# Patient Record
Sex: MALE | Race: BLACK OR AFRICAN AMERICAN | Employment: OTHER | URBAN - METROPOLITAN AREA
[De-identification: names, ages, dates, MRNs, and addresses within clinical notes are randomized per-mention and may not be internally consistent; named-entity substitution may affect disease eponyms.]

---

## 2017-01-26 ENCOUNTER — OFFICE VISIT (OUTPATIENT)
Dept: INTERNAL MEDICINE CLINIC | Age: 67
End: 2017-01-26

## 2017-01-26 VITALS
OXYGEN SATURATION: 96 % | DIASTOLIC BLOOD PRESSURE: 78 MMHG | TEMPERATURE: 99 F | SYSTOLIC BLOOD PRESSURE: 112 MMHG | BODY MASS INDEX: 31.08 KG/M2 | HEIGHT: 71 IN | HEART RATE: 100 BPM | WEIGHT: 222 LBS | RESPIRATION RATE: 16 BRPM

## 2017-01-26 DIAGNOSIS — E11.9 CONTROLLED TYPE 2 DIABETES MELLITUS WITHOUT COMPLICATION, WITHOUT LONG-TERM CURRENT USE OF INSULIN (HCC): Primary | ICD-10-CM

## 2017-01-26 DIAGNOSIS — Z12.11 SCREEN FOR COLON CANCER: ICD-10-CM

## 2017-01-26 DIAGNOSIS — Z11.59 NEED FOR HEPATITIS C SCREENING TEST: ICD-10-CM

## 2017-01-26 LAB
GLUCOSE POC: 145 MG/DL
HBA1C MFR BLD HPLC: 6.6 %

## 2017-01-26 NOTE — PATIENT INSTRUCTIONS
[email or web address] Activation    Thank you for requesting access to TigerTrade. Please follow the instructions below to securely access and download your online medical record. TigerTrade allows you to send messages to your doctor, view your test results, renew your prescriptions, schedule appointments, and more. How Do I Sign Up? 1. In your internet browser, go to [email or web address]  2. Click on the First Time User? Click Here link in the Sign In box. You will be redirect to the New Member Sign Up page. 3. Enter your TigerTrade Access Code exactly as it appears below. You will not need to use this code after youve completed the sign-up process. If you do not sign up before the expiration date, you must request a new code. TigerTrade Access Code: Activation code not generated  Current TigerTrade Status: Patient Declined (This is the date your TigerTrade access code will )    4. Enter the last four digits of your Social Security Number (xxxx) and Date of Birth (mm/dd/yyyy) as indicated and click Submit. You will be taken to the next sign-up page. 5. Create a TigerTrade ID. This will be your TigerTrade login ID and cannot be changed, so think of one that is secure and easy to remember. 6. Create a TigerTrade password. You can change your password at any time. 7. Enter your Password Reset Question and Answer. This can be used at a later time if you forget your password. 8. Enter your e-mail address. You will receive e-mail notification when new information is available in [address] 9. Click Sign Up. You can now view and download portions of your medical record. 10. Click the Download Summary menu link to download a portable copy of your medical information. Additional Information    If you have questions, please visit the Frequently Asked Questions section of the TigerTrade website at [email or web address] Remember, TigerTrade is NOT to be used for urgent needs. For medical emergencies, dial 911.

## 2017-01-26 NOTE — MR AVS SNAPSHOT
Visit Information Date & Time Provider Department Dept. Phone Encounter #  
 1/26/2017  1:45 PM Chilango Schmitz MD [address] [facility] [phone] [id number] Follow-up Instructions Return in about 3 months (around 4/26/2017), or if symptoms worsen or fail to improve. Upcoming Health Maintenance Date Due Hepatitis C Screening 1950 FOBT Q 1 YEAR AGE 50-75 12/5/2000 GLAUCOMA SCREENING Q2Y 12/5/2015 Pneumococcal 65+ Low/Medium Risk (1 of 2 - PCV13) 12/5/2015 MEDICARE YEARLY EXAM 12/5/2015 DTaP/Tdap/Td series (1 - Tdap) 11/16/2017* *Topic was postponed. The date shown is not the original due date. Allergies as of 1/26/2017  Review Complete On: 1/26/2017 By: Chilango Schmitz MD  
 Not on File Current Immunizations  Never Reviewed No immunizations on file. Not reviewed this visit You Were Diagnosed With   
  
 Codes Comments Controlled type 2 diabetes mellitus without complication, without long-term current use of insulin ([facility] [address]    -  Primary ICD-10-CM: E11.9 ICD-9-CM: 250.00 Screen for colon cancer     ICD-10-CM: Z12.11 ICD-9-CM: V76.51 Need for hepatitis C screening test     ICD-10-CM: Z11.59 
ICD-9-CM: V73.89 Vitals BP Pulse Temp Resp Height(growth percentile) Weight(growth percentile) 112/78 100 99 °F (37.2 °C) (Oral) 16 5' 11\" (1.803 m) 222 lb (100.7 kg) SpO2 BMI Smoking Status 96% 30.96 kg/m2 Never Smoker Vitals History BMI and BSA Data Body Mass Index Body Surface Area 30.96 kg/m 2 2.25 m 2 Preferred Pharmacy Pharmacy Name Phone [facility] [address] [address] [address] Your Updated Medication List  
  
   
This list is accurate as of: 1/26/17  2:27 PM.  Always use your most recent med list.  
  
  
  
  
 canagliflozin 100 mg tablet Commonly known as:  Kareen Lees Take 1 Tab by mouth Daily (before breakfast). metFORMIN 1,000 mg tablet Commonly known as:  GLUCOPHAGE Take 1 Tab by mouth two (2) times daily (with meals). simvastatin 20 mg tablet Commonly known as:  ZOCOR Take 1 Tab by mouth nightly. Prescriptions Sent to Pharmacy Refills  
 canagliflozin (INVOKANA) 100 mg tablet 12 Sig: Take 1 Tab by mouth Daily (before breakfast). Class: Normal  
 Pharmacy: [address] [place] [address] #: [phone] Route: Oral  
  
We Performed the Following AMB POC GLUCOSE BLOOD, BY GLUCOSE MONITORING DEVICE [79482 CPT(R)] AMB POC HEMOGLOBIN A1C [87972 CPT(R)] HEMOGLOBIN A1C WITH EAG [23446 CPT(R)] HEPATITIS C AB [84603 CPT(R)] OCCULT BLOOD, IMMUNOASSAY (FIT) [id number] CPT(R)] Follow-up Instructions Return in about 3 months (around 2017), or if symptoms worsen or fail to improve. Patient Instructions Real Savvy Activation Thank you for requesting access to Real Savvy. Please follow the instructions below to securely access and download your online medical record. Real Savvy allows you to send messages to your doctor, view your test results, renew your prescriptions, schedule appointments, and more. How Do I Sign Up? 1. In your internet browser, go to [email or web address] 
2. Click on the First Time User? Click Here link in the Sign In box. You will be redirect to the New Member Sign Up page. 3. Enter your Real Savvy Access Code exactly as it appears below. You will not need to use this code after youve completed the sign-up process. If you do not sign up before the expiration date, you must request a new code. Real Savvy Access Code: Activation code not generated Current Real Savvy Status: Patient Declined (This is the date your Real Savvy access code will ) 4.  Enter the last four digits of your Social Security Number (xxxx) and Date of Birth (mm/dd/yyyy) as indicated and click Submit. You will be taken to the next sign-up page. 5. Create a Ticies ID. This will be your Ticies login ID and cannot be changed, so think of one that is secure and easy to remember. 6. Create a Ticies password. You can change your password at any time. 7. Enter your Password Reset Question and Answer. This can be used at a later time if you forget your password. 8. Enter your e-mail address. You will receive e-mail notification when new information is available in [address] 9. Click Sign Up. You can now view and download portions of your medical record. 10. Click the Download Summary menu link to download a portable copy of your medical information. Additional Information If you have questions, please visit the Frequently Asked Questions section of the Ticies website at [email or web address] Remember, Ticies is NOT to be used for urgent needs. For medical emergencies, dial 911. Please provide this summary of care documentation to your next provider. If you have any questions after today's visit, please call [phone].

## 2017-01-26 NOTE — PROGRESS NOTES
Frankie Crenshaw is a 77 y.o. male and presents with Diabetes  . Subjective:  Diabetes Mellitus Review:  He has diabetes mellitus. Diabetic ROS - medication compliance: compliant all of the time, diabetic diet compliance: compliant all of the time, home glucose monitoring: is performed. Known diabetic complications: none  Cardiovascular risk factors: family history, dyslipidemia, diabetes mellitus, obesity, hypertension  Current diabetic medications include oral agents/  Eye exam current (within one year): no  Weight trend: stable  Prior visit with dietician: no  Current diet: \"healthy\" diet  in general  Current exercise: walking  Current monitoring regimen: home blood tests - daily  Home blood sugar records: trend: stable  Any episodes of hypoglycemia? no  Is He on ACE inhibitor or angiotensin II receptor blocker? Yes           Review of Systems  Constitutional: negative for fevers, chills, anorexia and weight loss  Eyes:   negative for visual disturbance and irritation  ENT:   negative for tinnitus,sore throat,nasal congestion,ear pains. hoarseness  Respiratory:  negative for cough, hemoptysis, dyspnea,wheezing  CV:   negative for chest pain, palpitations, lower extremity edema  GI:   negative for nausea, vomiting, diarrhea, abdominal pain,melena  Endo:               negative for polyuria,polydipsia,polyphagia,heat intolerance  Genitourinary: negative for frequency, dysuria and hematuria  Integument:  negative for rash and pruritus  Hematologic:  negative for easy bruising and gum/nose bleeding  Musculoskel: negative for myalgias, arthralgias, back pain, muscle weakness, joint pain  Neurological:  negative for headaches, dizziness, vertigo, memory problems and gait   Behavl/Psych: negative for feelings of anxiety, depression, mood changes    Past Medical History   Diagnosis Date    Diabetes [facility])      Past Surgical History   Procedure Laterality Date    Hx urological  2012    Hx prostatectomy  2012    Hx orthopaedic  2012     lumbar     Social History     Social History    Marital status: UNKNOWN     Spouse name: N/A    Number of children: 2    Years of education: N/A     Social History Main Topics    Smoking status: Never Smoker    Smokeless tobacco: None    Alcohol use No    Drug use: No    Sexual activity: Not Asked     Other Topics Concern     Service No    Blood Transfusions No    Caffeine Concern No    Occupational Exposure No    Hobby Hazards No    Sleep Concern No    Stress Concern No    Weight Concern No    Special Diet No    Back Care No    Exercise Yes    Bike Helmet No    Seat Belt No    Self-Exams No     Social History Narrative     No family history on file. Current Outpatient Prescriptions   Medication Sig Dispense Refill    canagliflozin (INVOKANA) 100 mg tablet Take 1 Tab by mouth Daily (before breakfast). 30 Tab 12    metFORMIN (GLUCOPHAGE) 1,000 mg tablet Take 1 Tab by mouth two (2) times daily (with meals). 60 Tab 12    simvastatin (ZOCOR) 20 mg tablet Take 1 Tab by mouth nightly.  30 Tab 12     Not on File    Objective:  Visit Vitals    /78    Pulse 100    Temp 99 °F (37.2 °C) (Oral)    Resp 16    Ht 5' 11\" (1.803 m)    Wt 222 lb (100.7 kg)    SpO2 96%    BMI 30.96 kg/m2     Physical Exam:   General appearance - alert, well appearing, and in no distress  Mental status - alert, oriented to person, place, and time  EYE-DAVIAN, EOMI, corneas normal, no foreign bodies  ENT-ENT exam normal, no neck nodes or sinus tenderness  Nose - normal and patent, no erythema, discharge or polyps  Mouth - mucous membranes moist, pharynx normal without lesions  Neck - supple, no significant adenopathy   Chest - clear to auscultation, no wheezes, rales or rhonchi, symmetric air entry   Heart - normal rate, regular rhythm, normal S1, S2, no murmurs, rubs, clicks or gallops   Abdomen - soft, nontender, nondistended, no masses or organomegaly  Lymph- no adenopathy palpable  Ext-peripheral pulses normal, no pedal edema, no clubbing or cyanosis  Skin-Warm and dry. no hyperpigmentation, vitiligo, or suspicious lesions  Neuro -alert, oriented, normal speech, no focal findings or movement disorder noted  Neck-normal C-spine, no tenderness, full ROM without pain      Results for orders placed or performed in visit on 01/26/17   AMB POC GLUCOSE BLOOD, BY GLUCOSE MONITORING DEVICE   Result Value Ref Range    Glucose  mg/dL   AMB POC HEMOGLOBIN A1C   Result Value Ref Range    Hemoglobin A1c (POC) 6.6 %       Assessment/Plan:    ICD-10-CM ICD-9-CM    1. Controlled type 2 diabetes mellitus without complication, without long-term current use of insulin (HCC) E11.9 250.00 AMB POC GLUCOSE BLOOD, BY GLUCOSE MONITORING DEVICE      AMB POC HEMOGLOBIN A1C      HEPATITIS C AB   2. Screen for colon cancer Z12.11 V76.51 OCCULT BLOOD, IMMUNOASSAY (FIT)   3. Need for hepatitis C screening test Z11.59 V73.89 HEMOGLOBIN A1C WITH EAG     Orders Placed This Encounter    HEMOGLOBIN A1C WITH EAG    OCCULT BLOOD, IMMUNOASSAY (FIT)    HEPATITIS C AB    AMB POC GLUCOSE BLOOD, BY GLUCOSE MONITORING DEVICE    AMB POC HEMOGLOBIN A1C    canagliflozin (INVOKANA) 100 mg tablet     Sig: Take 1 Tab by mouth Daily (before breakfast). Dispense:  30 Tab     Refill:  12     lose weight, follow low fat diet, follow low salt diet,Take 81mg aspirin daily  Patient Instructions   CLIPPATEhart Activation    Thank you for requesting access to openPeople. Please follow the instructions below to securely access and download your online medical record. openPeople allows you to send messages to your doctor, view your test results, renew your prescriptions, schedule appointments, and more. How Do I Sign Up? 1. In your internet browser, go to [email or web address]  2. Click on the First Time User? Click Here link in the Sign In box. You will be redirect to the New Member Sign Up page.   3. Enter your openPeople Access Code exactly as it appears below. You will not need to use this code after youve completed the sign-up process. If you do not sign up before the expiration date, you must request a new code. Searchmetricst Access Code: Activation code not generated  Current Cryoocyte Status: Patient Declined (This is the date your Searchmetricst access code will )    4. Enter the last four digits of your Social Security Number (xxxx) and Date of Birth (mm/dd/yyyy) as indicated and click Submit. You will be taken to the next sign-up page. 5. Create a Searchmetricst ID. This will be your Cryoocyte login ID and cannot be changed, so think of one that is secure and easy to remember. 6. Create a Searchmetricst password. You can change your password at any time. 7. Enter your Password Reset Question and Answer. This can be used at a later time if you forget your password. 8. Enter your e-mail address. You will receive e-mail notification when new information is available in [address] 9. Click Sign Up. You can now view and download portions of your medical record. 10. Click the Download Summary menu link to download a portable copy of your medical information. Additional Information    If you have questions, please visit the Frequently Asked Questions section of the Cryoocyte website at [email or web address] Remember, Cryoocyte is NOT to be used for urgent needs. For medical emergencies, dial 911. Follow-up Disposition:  Return in about 3 months (around 2017), or if symptoms worsen or fail to improve. I have reviewed with the patient details of the assessment and plan and all questions were answered. Relevent patient education was performed    An After Visit Summary was printed and given to the patient.

## 2017-01-27 LAB
EST. AVERAGE GLUCOSE BLD GHB EST-MCNC: 140 MG/DL
HBA1C MFR BLD: 6.5 % (ref 4.8–5.6)
HCV AB S/CO SERPL IA: <0.1 S/CO RATIO (ref 0–0.9)

## 2017-02-03 LAB — HEMOCCULT STL QL IA: NEGATIVE

## 2017-03-30 ENCOUNTER — TELEPHONE (OUTPATIENT)
Dept: INTERNAL MEDICINE CLINIC | Age: 67
End: 2017-03-30

## 2017-03-30 NOTE — TELEPHONE ENCOUNTER
Dr Ami Bonilla from medical disability called in reference to disability claim. Per DR Aishwarya Garcia.''  Dr. Ami Bonilla will talk to patient's ortho PHYSICIAN regarding any knee restrictions.   [phone]

## 2017-04-26 ENCOUNTER — OFFICE VISIT (OUTPATIENT)
Dept: INTERNAL MEDICINE CLINIC | Age: 67
End: 2017-04-26

## 2017-04-26 VITALS
HEIGHT: 71 IN | HEART RATE: 78 BPM | SYSTOLIC BLOOD PRESSURE: 120 MMHG | DIASTOLIC BLOOD PRESSURE: 80 MMHG | RESPIRATION RATE: 97 BRPM | TEMPERATURE: 97.8 F | BODY MASS INDEX: 32.41 KG/M2 | WEIGHT: 231.5 LBS

## 2017-04-26 DIAGNOSIS — E11.9 CONTROLLED TYPE 2 DIABETES MELLITUS WITHOUT COMPLICATION, WITHOUT LONG-TERM CURRENT USE OF INSULIN (HCC): Primary | ICD-10-CM

## 2017-04-26 DIAGNOSIS — Z00.00 MEDICARE ANNUAL WELLNESS VISIT, SUBSEQUENT: ICD-10-CM

## 2017-04-26 LAB
GLUCOSE POC: 107 MG/DL
HBA1C MFR BLD HPLC: 6.6 %

## 2017-04-26 NOTE — PROGRESS NOTES
Gorge Heimlich is a 77 y.o. male and presents with Follow Up Chronic Condition  . Subjective:  Diabetes Mellitus Review:  He has diabetes mellitus. Diabetic ROS - medication compliance: compliant all of the time, diabetic diet compliance: compliant all of the time, home glucose monitoring: is performed. Known diabetic complications: none  Cardiovascular risk factors: family history, dyslipidemia, diabetes mellitus, obesity, hypertension  Current diabetic medications include oral agents/  Eye exam current (within one year): no  Weight trend: stable  Prior visit with dietician: no  Current diet: \"healthy\" diet  in general  Current exercise: walking  Current monitoring regimen: home blood tests - daily  Home blood sugar records: trend: stable  Any episodes of hypoglycemia? no  Is He on ACE inhibitor or angiotensin II receptor blocker? Yes     Dyslipidemia Review:  Patient presents for evaluation of lipids. Compliance with treatment thus far has been excellent. A repeat fasting lipid profile was done. The patient does not use medications that may worsen dyslipidemias (corticosteroids, progestins, anabolic steroids, diuretics, beta-blockers, amiodarone, cyclosporine, olanzapine). The patient exercises some        Gorge Heimlich is a 77 y.o. male and presents for annual Medicare Wellness Visit. Problem List: Reviewed with patient and discussed risk factors. There is no problem list on file for this patient. Current medical providers:  No care team member to display    [address]: Reviewed with patient  Past Surgical History:   Procedure Laterality Date    HX ORTHOPAEDIC  2012    lumbar    HX PROSTATECTOMY  2012    HX UROLOGICAL  2012        SH: Reviewed with patient  Social History   Substance Use Topics    Smoking status: Never Smoker    Smokeless tobacco: None    Alcohol use No       FH: Reviewed with patient  History reviewed. No pertinent family history.     Medications/Allergies: Reviewed with patient  Current Outpatient Prescriptions on File Prior to Visit   Medication Sig Dispense Refill    canagliflozin (INVOKANA) 100 mg tablet Take 1 Tab by mouth Daily (before breakfast). 30 Tab 12    metFORMIN (GLUCOPHAGE) 1,000 mg tablet Take 1 Tab by mouth two (2) times daily (with meals). 60 Tab 12    simvastatin (ZOCOR) 20 mg tablet Take 1 Tab by mouth nightly. 30 Tab 12     No current facility-administered medications on file prior to visit. Not on File    Objective:  Visit Vitals    /80 (BP 1 Location: Right arm, BP Patient Position: Sitting)    Pulse 78    Temp 97.8 °F (36.6 °C)    Resp (!) 97    Ht 5' 11\" (1.803 m)    Wt 231 lb 8 oz (105 kg)    BMI 32.29 kg/m2    Body mass index is 32.29 kg/(m^2). Assessment of cognitive impairment: Alert and oriented x 3    Depression Screen:   PHQ 2 / 9, over the last two weeks 4/26/2017   Little interest or pleasure in doing things Not at all   Feeling down, depressed or hopeless Not at all   Total Score PHQ 2 0       Fall Risk Assessment:    Fall Risk Assessment, last 12 mths 4/26/2017   Able to walk? Yes   Fall in past 12 months? No       Functional Ability:   Does the patient exhibit a steady gait? yes   How long did it take the patient to get up and walk from a sitting position? seconds   Is the patient self reliant?  (ie can do own laundry, meals, household chores)  yes     Does the patient handle his/her own medications? yes     Does the patient handle his/her own money? yes     Is the patients home safe (ie good lighting, handrails on stairs and bath, etc.)? yes     Did you notice or did patient express any hearing difficulties? no     Did you notice or did patient express any vision difficulties?   no     Were distance and reading eye charts used?   yes       Advance Care Planning:   Patient was offered the opportunity to discuss advance care planning:  yes     Does patient have an Advance Directive:  yes   If no, did you provide information on Caring Connections? yes       Plan:      Orders Placed This Encounter    AMB POC GLUCOSE BLOOD, BY GLUCOSE MONITORING DEVICE    AMB POC HEMOGLOBIN A1C       Health Maintenance   Topic Date Due    FOOT EXAM Q1  12/05/1960    MICROALBUMIN Q1  12/05/1960    EYE EXAM RETINAL OR DILATED Q1  12/05/1960    GLAUCOMA SCREENING Q2Y  12/05/2015    Pneumococcal 65+ Low/Medium Risk (1 of 2 - PCV13) 12/05/2015    MEDICARE YEARLY EXAM  12/05/2015    DTaP/Tdap/Td series (1 - Tdap) 11/16/2017 (Originally 12/5/1971)    HEMOGLOBIN A1C Q6M  07/26/2017    LIPID PANEL Q1  11/10/2017    FOBT Q 1 YEAR AGE 50-75  01/30/2018    Hepatitis C Screening  Completed    ZOSTER VACCINE AGE 60>  Addressed    INFLUENZA AGE 9 TO ADULT  Addressed       *Patient verbalized understanding and agreement with the plan. A copy of the After Visit Summary with personalized health plan was given to the patient today. Review of Systems  Constitutional: negative for fevers, chills, anorexia and weight loss  Eyes:   negative for visual disturbance and irritation  ENT:   negative for tinnitus,sore throat,nasal congestion,ear pains. hoarseness  Respiratory:  negative for cough, hemoptysis, dyspnea,wheezing  CV:   negative for chest pain, palpitations, lower extremity edema  GI:   negative for nausea, vomiting, diarrhea, abdominal pain,melena  Endo:               negative for polyuria,polydipsia,polyphagia,heat intolerance  Genitourinary: negative for frequency, dysuria and hematuria  Integument:  negative for rash and pruritus  Hematologic:  negative for easy bruising and gum/nose bleeding  Musculoskel: negative for myalgias, arthralgias, back pain, muscle weakness, joint pain  Neurological:  negative for headaches, dizziness, vertigo, memory problems and gait   Behavl/Psych: negative for feelings of anxiety, depression, mood changes    Past Medical History:   Diagnosis Date    Diabetes [facility])      Past Surgical History: Procedure Laterality Date    HX ORTHOPAEDIC  2012    lumbar    HX PROSTATECTOMY  2012    HX UROLOGICAL  2012     Social History     Social History    Marital status: UNKNOWN     Spouse name: N/A    Number of children: 2    Years of education: N/A     Social History Main Topics    Smoking status: Never Smoker    Smokeless tobacco: None    Alcohol use No    Drug use: No    Sexual activity: Not Asked     Other Topics Concern     Service No    Blood Transfusions No    Caffeine Concern No    Occupational Exposure No    Hobby Hazards No    Sleep Concern No    Stress Concern No    Weight Concern No    Special Diet No    Back Care No    Exercise Yes    Bike Helmet No    Seat Belt No    Self-Exams No     Social History Narrative     History reviewed. No pertinent family history. Current Outpatient Prescriptions   Medication Sig Dispense Refill    canagliflozin (INVOKANA) 100 mg tablet Take 1 Tab by mouth Daily (before breakfast). 30 Tab 12    metFORMIN (GLUCOPHAGE) 1,000 mg tablet Take 1 Tab by mouth two (2) times daily (with meals). 60 Tab 12    simvastatin (ZOCOR) 20 mg tablet Take 1 Tab by mouth nightly.  30 Tab 12     Not on File    Objective:  Visit Vitals    /80 (BP 1 Location: Right arm, BP Patient Position: Sitting)    Pulse 78    Temp 97.8 °F (36.6 °C)    Resp (!) 97    Ht 5' 11\" (1.803 m)    Wt 231 lb 8 oz (105 kg)    BMI 32.29 kg/m2     Physical Exam:   General appearance - alert, well appearing, and in no distress  Mental status - alert, oriented to person, place, and time  EYE-DAVIAN, EOMI, corneas normal, no foreign bodies  ENT-ENT exam normal, no neck nodes or sinus tenderness  Nose - normal and patent, no erythema, discharge or polyps  Mouth - mucous membranes moist, pharynx normal without lesions  Neck - supple, no significant adenopathy   Chest - clear to auscultation, no wheezes, rales or rhonchi, symmetric air entry   Heart - normal rate, regular rhythm, normal S1, S2, no murmurs, rubs, clicks or gallops   Abdomen - soft, nontender, nondistended, no masses or organomegaly  Lymph- no adenopathy palpable  Ext-peripheral pulses normal, no pedal edema, no clubbing or cyanosis  Skin-Warm and dry. no hyperpigmentation, vitiligo, or suspicious lesions  Neuro -alert, oriented, normal speech, no focal findings or movement disorder noted  Neck-normal C-spine, no tenderness, full ROM without pain      Results for orders placed or performed in visit on 04/26/17   AMB POC GLUCOSE BLOOD, BY GLUCOSE MONITORING DEVICE   Result Value Ref Range    Glucose  mg/dL   AMB POC HEMOGLOBIN A1C   Result Value Ref Range    Hemoglobin A1c (POC) 6.6 %       Assessment/Plan:    ICD-10-CM ICD-9-CM    1. Controlled type 2 diabetes mellitus without complication, without long-term current use of insulin (HCC) E11.9 250.00 AMB POC GLUCOSE BLOOD, BY GLUCOSE MONITORING DEVICE      AMB POC HEMOGLOBIN A1C   2. Medicare annual wellness visit, subsequent Z00.00 V70.0      Orders Placed This Encounter    AMB POC GLUCOSE BLOOD, BY GLUCOSE MONITORING DEVICE    AMB POC HEMOGLOBIN A1C     lose weight, follow low fat diet, follow low salt diet,Take 81mg aspirin daily  Patient Instructions   3D Product [employer] Activation    Thank you for requesting access to [facility]. Please follow the instructions below to securely access and download your online medical record. [facility] allows you to send messages to your doctor, view your test results, renew your prescriptions, schedule appointments, and more. How Do I Sign Up? 1. In your internet browser, go to [email or web address]  2. Click on the First Time User? Click Here link in the Sign In box. You will be redirect to the New Member Sign Up page. 3. Enter your [facility] Access Code exactly as it appears below. You will not need to use this code after youve completed the sign-up process.  If you do not sign up before the expiration date, you must request a new code.    S[employer]te[employer]art Access Code: Activation code not generated  Current [facility] Status: Patient Declined (This is the date your Sifteohart access code will )    4. Enter the last four digits of your Social Security Number (xxxx) and Date of Birth (mm/dd/yyyy) as indicated and click Submit. You will be taken to the next sign-up page. 5. Create a [address] ID. This will be your [facility] login ID and cannot be changed, so think of one that is secure and easy to remember. 6. Create a [address] password. You can change your password at any time. 7. Enter your Password Reset Question and Answer. This can be used at a later time if you forget your password. 8. Enter your e-mail address. You will receive e-mail notification when new information is available in [address] 9. Click Sign Up. You can now view and download portions of your medical record. 10. Click the Download Summary menu link to download a portable copy of your medical information. Additional Information    If you have questions, please visit the Frequently Asked Questions section of the [employer] website at [email or web address] Remember, [facility] is NOT to be used for urgent needs. For medical emergencies, dial 911. Follow-up Disposition:  Return in about 6 months (around 10/26/2017), or if symptoms worsen or fail to improve. I have reviewed with the patient details of the assessment and plan and all questions were answered. Relevent patient education was performed    An After Visit Summary was printed and given to the patient.

## 2017-04-26 NOTE — PROGRESS NOTES
Chief Complaint   Patient presents with    Follow Up Chronic Condition     1. Have you been to the ER, urgent care clinic since your last visit? Hospitalized since your last visit? No    2. Have you seen or consulted any other health care providers outside of the [address] [place] [address] since your last visit? Include any pap smears or colon screening.  No

## 2017-04-26 NOTE — PATIENT INSTRUCTIONS
[employer] Activation    Thank you for requesting access to [facility]. Please follow the instructions below to securely access and download your online medical record. [facility] allows you to send messages to your doctor, view your test results, renew your prescriptions, schedule appointments, and more. How Do I Sign Up? 1. In your internet browser, go to [email or web address]  2. Click on the First Time User? Click Here link in the Sign In box. You will be redirect to the New Member Sign Up page. 3. Enter your [facility] Access Code exactly as it appears below. You will not need to use this code after youve completed the sign-up process. If you do not sign up before the expiration date, you must request a new code. [facility] Access Code: Activation code not generated  Current [facility] Status: Patient Declined (This is the date your [facility] access code will )    4. Enter the last four digits of your Social Security Number (xxxx) and Date of Birth (mm/dd/yyyy) as indicated and click Submit. You will be taken to the next sign-up page. 5. Create a [facility] ID. This will be your [facility] login ID and cannot be changed, so think of one that is secure and easy to remember. 6. Create a [facility] password. You can change your password at any time. 7. Enter your Password Reset Question and Answer. This can be used at a later time if you forget your password. 8. Enter your e-mail address. You will receive e-mail notification when new information is available in [address] 9. Click Sign Up. You can now view and download portions of your medical record. 10. Click the Download Summary menu link to download a portable copy of your medical information. Additional Information    If you have questions, please visit the Frequently Asked Questions section of the [facility] website at [email or web address] Remember, [facility] is NOT to be used for urgent needs. For medical emergencies, dial 911.

## 2017-04-26 NOTE — MR AVS SNAPSHOT
Visit Information Date & Time Provider Department Dept. Phone Encounter #  
 4/26/2017  9:30 AM Dori Sierra MD [facility] Monico Flores [phone] [id number] Follow-up Instructions Return in about 6 months (around 10/26/2017), or if symptoms worsen or fail to improve. Follow-up and Disposition History Upcoming Health Maintenance Date Due  
 FOOT EXAM Q1 12/5/1960 MICROALBUMIN Q1 12/5/1960 EYE EXAM RETINAL OR DILATED Q1 12/5/1960 GLAUCOMA SCREENING Q2Y 12/5/2015 Pneumococcal 65+ Low/Medium Risk (1 of 2 - PCV13) 12/5/2015 MEDICARE YEARLY EXAM 12/5/2015 DTaP/Tdap/Td series (1 - Tdap) 11/16/2017* HEMOGLOBIN A1C Q6M 7/26/2017 LIPID PANEL Q1 11/10/2017 FOBT Q 1 YEAR AGE 50-75 1/30/2018 *Topic was postponed. The date shown is not the original due date. Allergies as of 4/26/2017  Review Complete On: 4/26/2017 By: Dori Sierra MD  
 Not on File Current Immunizations  Never Reviewed No immunizations on file. Not reviewed this visit You Were Diagnosed With   
  
 Codes Comments Controlled type 2 diabetes mellitus without complication, without long-term current use of insulin ([facility]    -  Primary ICD-10-CM: E11.9 ICD-9-CM: 250.00 Medicare annual wellness visit, subsequent     ICD-10-CM: Z00.00 ICD-9-CM: V70.0 Vitals BP Pulse Temp Resp Height(growth percentile) Weight(growth percentile) 120/80 (BP 1 Location: Right arm, BP Patient Position: Sitting) 78 97.8 °F (36.6 °C) (!) 97 5' 11\" (1.803 m) 231 lb 8 oz (105 kg) BMI Smoking Status 32.29 kg/m2 Never Smoker BMI and BSA Data Body Mass Index Body Surface Area  
 32.29 kg/m 2 2.29 m 2 Preferred Pharmacy Pharmacy Name Phone [facility] [address] Your Updated Medication List  
  
   
 This list is accurate as of: 17 11:11 AM.  Always use your most recent med list.  
  
  
  
  
 canagliflozin 100 mg tablet Commonly known as:  Harshad Homme Take 1 Tab by mouth Daily (before breakfast). metFORMIN 1,000 mg tablet Commonly known as:  GLUCOPHAGE Take 1 Tab by mouth two (2) times daily (with meals). simvastatin 20 mg tablet Commonly known as:  ZOCOR Take 1 Tab by mouth nightly. We Performed the Following AMB POC GLUCOSE BLOOD, BY GLUCOSE MONITORING DEVICE [48062 CPT(R)] AMB POC HEMOGLOBIN A1C [64098 CPT(R)] Follow-up Instructions Return in about 6 months (around 10/26/2017), or if symptoms worsen or fail to improve. Patient Instructions Smart VoicemailharAsh Access Technology Activation Thank you for requesting access to [employer]. Please follow the instructions below to securely access and download your online medical record. [employer] allows you to send messages to your doctor, view your test results, renew your prescriptions, schedule appointments, and more. How Do I Sign Up? 1. In your internet browser, go to [email or web address] 
2. Click on the First Time User? Click Here link in the Sign In box. You will be redirect to the New Member Sign Up page. 3. Enter your [employer] Access Code exactly as it appears below. You will not need to use this code after youve completed the sign-up process. If you do not sign up before the expiration date, you must request a new code. [employer] Access Code: Activation code not generated Current [employer] Status: Patient Declined (This is the date your [employer] access code will ) 4. Enter the last four digits of your Social Security Number (xxxx) and Date of Birth (mm/dd/yyyy) as indicated and click Submit. You will be taken to the next sign-up page. 5. Create a [employer] ID. This will be your [employer] login ID and cannot be changed, so think of one that is secure and easy to remember. 6. Create a Sonendo password. You can change your password at any time. 7. Enter your Password Reset Question and Answer. This can be used at a later time if you forget your password. 8. Enter your e-mail address. You will receive e-mail notification when new information is available in [address] 9. Click Sign Up. You can now view and download portions of your medical record. 10. Click the Download Summary menu link to download a portable copy of your medical information. Additional Information If you have questions, please visit the Frequently Asked Questions section of the [employer] website at [email or web address] Remember, [employer] is NOT to be used for urgent needs. For medical emergencies, dial 911. Please provide this summary of care documentation to your next provider. If you have any questions after today's visit, please call [phone].

## 2017-10-26 ENCOUNTER — OFFICE VISIT (OUTPATIENT)
Dept: INTERNAL MEDICINE CLINIC | Age: 67
End: 2017-10-26

## 2017-10-26 VITALS
HEART RATE: 68 BPM | DIASTOLIC BLOOD PRESSURE: 80 MMHG | HEIGHT: 71 IN | TEMPERATURE: 97.6 F | SYSTOLIC BLOOD PRESSURE: 130 MMHG | BODY MASS INDEX: 33.46 KG/M2 | WEIGHT: 239 LBS | OXYGEN SATURATION: 98 % | RESPIRATION RATE: 20 BRPM

## 2017-10-26 DIAGNOSIS — C61 PROSTATE CANCER (HCC): ICD-10-CM

## 2017-10-26 DIAGNOSIS — E78.00 HYPERCHOLESTEREMIA: ICD-10-CM

## 2017-10-26 DIAGNOSIS — E11.9 TYPE 2 DIABETES MELLITUS WITHOUT COMPLICATION, WITHOUT LONG-TERM CURRENT USE OF INSULIN (HCC): Primary | ICD-10-CM

## 2017-10-26 DIAGNOSIS — N52.1 ERECTILE DISORDER DUE TO MEDICAL CONDITION IN MALE PATIENT: ICD-10-CM

## 2017-10-26 LAB
CHOLEST SERPL-MCNC: 225 MG/DL
GLUCOSE POC: 136 MG/DL
HBA1C MFR BLD HPLC: 6 %
HDLC SERPL-MCNC: 69 MG/DL
LDL CHOLESTEROL POC: NORMAL MG/DL
NON-HDL GOAL (POC): 156
TCHOL/HDL RATIO (POC): 3.3
TRIGL SERPL-MCNC: <45 MG/DL

## 2017-10-26 RX ORDER — ATORVASTATIN CALCIUM 40 MG/1
40 TABLET, FILM COATED ORAL DAILY
Qty: 30 TAB | Refills: 12 | Status: SHIPPED | OUTPATIENT
Start: 2017-10-26 | End: 2018-08-09 | Stop reason: SDUPTHER

## 2017-10-26 NOTE — PATIENT INSTRUCTIONS
[employer] Activation    Thank you for requesting access to [facility]. Please follow the instructions below to securely access and download your online medical record. [facility] allows you to send messages to your doctor, view your test results, renew your prescriptions, schedule appointments, and more. How Do I Sign Up? 1. In your internet browser, go to [email or web address]  2. Click on the First Time User? Click Here link in the Sign In box. You will be redirect to the New Member Sign Up page. 3. Enter your [employer] Access Code exactly as it appears below. You will not need to use this code after youve completed the sign-up process. If you do not sign up before the expiration date, you must request a new code. [facility] Access Code: Activation code not generated  Current [employer] Status: Patient Declined (This is the date your [employer] access code will )    4. Enter the last four digits of your Social Security Number (xxxx) and Date of Birth (mm/dd/yyyy) as indicated and click Submit. You will be taken to the next sign-up page. 5. Create a OpTrip ID. This will be your [facility] login ID and cannot be changed, so think of one that is secure and easy to remember. 6. Create a [employer]Trip password. You can change your password at any time. 7. Enter your Password Reset Question and Answer. This can be used at a later time if you forget your password. 8. Enter your e-mail address. You will receive e-mail notification when new information is available in [address] 9. Click Sign Up. You can now view and download portions of your medical record. 10. Click the Download Summary menu link to download a portable copy of your medical information. Additional Information    If you have questions, please visit the Frequently Asked Questions section of the [employer] website at [email or web address] Remember, [employer] is NOT to be used for urgent needs. For medical emergencies, dial 911.

## 2017-10-26 NOTE — MR AVS SNAPSHOT
Visit Information Date & Time Provider Department Dept. Phone Encounter #  
 10/26/2017  8:00 AM Alli Rutherford MD [address] [facility] [phone] [id number] Follow-up Instructions Return in about 3 months (around 1/26/2018), or if symptoms worsen or fail to improve. Follow-up and Disposition History Upcoming Health Maintenance Date Due  
 GLAUCOMA SCREENING Q2Y 12/5/2015 DTaP/Tdap/Td series (1 - Tdap) 11/16/2017* FOBT Q 1 YEAR AGE 50-75 1/30/2018 MEDICARE YEARLY EXAM 4/27/2018 Pneumococcal 65+ Low/Medium Risk (2 of 2 - PPSV23) 10/26/2018 *Topic was postponed. The date shown is not the original due date. Allergies as of 10/26/2017  Review Complete On: 10/26/2017 By: Alli Rutherford MD  
 Not on File Current Immunizations  Never Reviewed No immunizations on file. Not reviewed this visit You Were Diagnosed With   
  
 Codes Comments Type 2 diabetes mellitus without complication, without long-term current use of insulin (HCC)    -  Primary ICD-10-CM: E11.9 ICD-9-CM: 250.00 Hypercholesteremia     ICD-10-CM: E78.00 ICD-9-CM: 272.0 Erectile disorder due to medical condition in male patient     ICD-10-CM: N52.1 ICD-9-CM: 607.84 Prostate cancer [facility])     ICD-10-CM: R03 ICD-9-CM: 753 Vitals BP Pulse Temp Resp Height(growth percentile) Weight(growth percentile) 130/80 (BP 1 Location: Right arm, BP Patient Position: Sitting) 68 97.6 °F (36.4 °C) (Oral) 20 5' 11\" (1.803 m) 239 lb (108.4 kg) SpO2 BMI Smoking Status 98% 33.33 kg/m2 Never Smoker BMI and BSA Data Body Mass Index Body Surface Area  
 33.33 kg/m 2 2.33 m 2 Preferred Pharmacy Pharmacy Name Phone [facility] [address] [address] Your Updated Medication List  
  
   
This list is accurate as of: 10/26/17  8:29 AM.  Always use your most recent med list.  
  
  
  
  
 atorvastatin 40 mg tablet Commonly known as:  LIPITOR Take 1 Tab by mouth daily. canagliflozin 100 mg tablet Commonly known as:  Luevenia Nails Take 1 Tab by mouth Daily (before breakfast). metFORMIN 1,000 mg tablet Commonly known as:  GLUCOPHAGE Take 1 Tab by mouth two (2) times daily (with meals). Prescriptions Sent to Pharmacy Refills  
 atorvastatin (LIPITOR) 40 mg tablet 12 Sig: Take 1 Tab by mouth daily. Class: Normal  
 Pharmacy: [address] [place] [address] [address] #: [phone] Route: Oral  
  
We Performed the Following AMB POC GLUCOSE BLOOD, BY GLUCOSE MONITORING DEVICE [67264 CPT(R)] AMB POC HEMOGLOBIN A1C [19620 CPT(R)] AMB POC LIPID PROFILE [62864 CPT(R)] REFERRAL TO UROLOGY [BQV645 Custom] Follow-up Instructions Return in about 3 months (around 1/26/2018), or if symptoms worsen or fail to improve. Referral Information Referral ID Referred By Referred To [id number] [facility] [address]   
   [place], [facility] [address] Visits Status Start Date End Date 1 New Request 10/26/17 10/26/18 If your referral has a status of pending review or denied, additional information will be sent to support the outcome of this decision. Patient Instructions Khan Academy Activation Thank you for requesting access to Khan Academy. Please follow the instructions below to securely access and download your online medical record. Khan Academy allows you to send messages to your doctor, view your test results, renew your prescriptions, schedule appointments, and more. How Do I Sign Up? 1. In your internet browser, go to [email or web address] 
2. Click on the First Time User? Click Here link in the Sign In box. You will be redirect to the New Member Sign Up page. 3. Enter your Punch Entertainmentt Access Code exactly as it appears below. You will not need to use this code after youve completed the sign-up process. If you do not sign up before the expiration date, you must request a new code. MyChart Access Code: Activation code not generated Current GoSquared Status: Patient Declined (This is the date your MyChart access code will ) 4. Enter the last four digits of your Social Security Number (xxxx) and Date of Birth (mm/dd/yyyy) as indicated and click Submit. You will be taken to the next sign-up page. 5. Create a Punch Entertainmentt ID. This will be your GoSquared login ID and cannot be changed, so think of one that is secure and easy to remember. 6. Create a Punch Entertainmentt password. You can change your password at any time. 7. Enter your Password Reset Question and Answer. This can be used at a later time if you forget your password. 8. Enter your e-mail address. You will receive e-mail notification when new information is available in [address] 9. Click Sign Up. You can now view and download portions of your medical record. 10. Click the Download Summary menu link to download a portable copy of your medical information. Additional Information If you have questions, please visit the Frequently Asked Questions section of the Punch Entertainmentt website at [email or web address] Remember, GoSquared is NOT to be used for urgent needs. For medical emergencies, dial 911. Please provide this summary of care documentation to your next provider. Your primary care clinician is listed as Shoshana Garcia. If you have any questions after today's visit, please call [phone].

## 2017-10-26 NOTE — PROGRESS NOTES
Frankie Crenshaw is a 77 y.o. male and presents with Diabetes and Cholesterol Problem  . Subjective:  Diabetes Mellitus Review:  He has diabetes mellitus. Diabetic ROS - medication compliance: compliant all of the time, diabetic diet compliance: compliant all of the time, home glucose monitoring: is performed. Known diabetic complications: none  Cardiovascular risk factors: family history, dyslipidemia, diabetes mellitus, obesity, hypertension  Current diabetic medications include oral agents/  Eye exam current (within one year): no  Weight trend: stable  Prior visit with dietician: no  Current diet: \"healthy\" diet  in general  Current exercise: walking  Current monitoring regimen: home blood tests - daily  Home blood sugar records: trend: stable  Any episodes of hypoglycemia? no  Is He on ACE inhibitor or angiotensin II receptor blocker? Yes     Dyslipidemia Review:  Patient presents for evaluation of lipids. Compliance with treatment thus far has been excellent. A repeat fasting lipid profile was done. The patient does not use medications that may worsen dyslipidemias (corticosteroids, progestins, anabolic steroids, diuretics, beta-blockers, amiodarone, cyclosporine, olanzapine). The patient exercises some      Erectile dysfunction Review[de-identified]  Patient complains of difficulty in maintaining an adequate erection. He states that his present medication is not helping . Review of Systems  Constitutional: negative for fevers, chills, anorexia and weight loss  Eyes:   negative for visual disturbance and irritation  ENT:   negative for tinnitus,sore throat,nasal congestion,ear pains. hoarseness  Respiratory:  negative for cough, hemoptysis, dyspnea,wheezing  CV:   negative for chest pain, palpitations, lower extremity edema  GI:   negative for nausea, vomiting, diarrhea, abdominal pain,melena  Endo:               negative for polyuria,polydipsia,polyphagia,heat intolerance  Genitourinary: negative for frequency, dysuria and hematuria  Integument:  negative for rash and pruritus  Hematologic:  negative for easy bruising and gum/nose bleeding  Musculoskel: negative for myalgias, arthralgias, back pain, muscle weakness, joint pain  Neurological:  negative for headaches, dizziness, vertigo, memory problems and gait   Behavl/Psych: negative for feelings of anxiety, depression, mood changes    Past Medical History:   Diagnosis Date    Diabetes [facility])      Past Surgical History:   Procedure Laterality Date    HX ORTHOPAEDIC  2012    lumbar    HX PROSTATECTOMY  2012    HX UROLOGICAL  2012     Social History     Social History    Marital status: UNKNOWN     Spouse name: N/A    Number of children: 2    Years of education: N/A     Social History Main Topics    Smoking status: Never Smoker    Smokeless tobacco: Never Used    Alcohol use No    Drug use: No    Sexual activity: Not Asked     Other Topics Concern     Service No    Blood Transfusions No    Caffeine Concern No    Occupational Exposure No    Hobby Hazards No    Sleep Concern No    Stress Concern No    Weight Concern No    Special Diet No    Back Care No    Exercise Yes    Bike Helmet No    Seat Belt No    Self-Exams No     Social History Narrative     History reviewed. No pertinent family history. Current Outpatient Prescriptions   Medication Sig Dispense Refill    atorvastatin (LIPITOR) 40 mg tablet Take 1 Tab by mouth daily. 30 Tab 12    metFORMIN (GLUCOPHAGE) 1,000 mg tablet Take 1 Tab by mouth two (2) times daily (with meals). 60 Tab 12    canagliflozin (INVOKANA) 100 mg tablet Take 1 Tab by mouth Daily (before breakfast).  30 Tab 12     Not on File    Objective:  Visit Vitals    /80 (BP 1 Location: Right arm, BP Patient Position: Sitting)    Pulse 68    Temp 97.6 °F (36.4 °C) (Oral)    Resp 20    Ht 5' 11\" (1.803 m)    Wt 239 lb (108.4 kg)    SpO2 98%    BMI 33.33 kg/m2     Physical Exam:   General appearance - alert, well appearing, and in no distress  Mental status - alert, oriented to person, place, and time  EYE-DAVIAN, EOMI, corneas normal, no foreign bodies  ENT-ENT exam normal, no neck nodes or sinus tenderness  Nose - normal and patent, no erythema, discharge or polyps  Mouth - mucous membranes moist, pharynx normal without lesions  Neck - supple, no significant adenopathy   Chest - clear to auscultation, no wheezes, rales or rhonchi, symmetric air entry   Heart - normal rate, regular rhythm, normal S1, S2, no murmurs, rubs, clicks or gallops   Abdomen - soft, nontender, nondistended, no masses or organomegaly  Lymph- no adenopathy palpable  Ext-peripheral pulses normal, no pedal edema, no clubbing or cyanosis  Skin-Warm and dry. no hyperpigmentation, vitiligo, or suspicious lesions  Neuro -alert, oriented, normal speech, no focal findings or movement disorder noted  Neck-normal C-spine, no tenderness, full ROM without pain      Results for orders placed or performed in visit on 10/26/17   AMB POC LIPID PROFILE   Result Value Ref Range    Cholesterol (POC) 225     Triglycerides (POC) <45     HDL Cholesterol (POC) 69     LDL Cholesterol (POC) n/a MG/DL    Non-HDL Goal (POC) 156     TChol/HDL Ratio (POC) 3.3    AMB POC GLUCOSE BLOOD, BY GLUCOSE MONITORING DEVICE   Result Value Ref Range    Glucose  mg/dL   AMB POC HEMOGLOBIN A1C   Result Value Ref Range    Hemoglobin A1c (POC) 6.0 %       Assessment/Plan:    ICD-10-CM ICD-9-CM    1. Type 2 diabetes mellitus without complication, without long-term current use of insulin ([facility]) E11.9 250.00 AMB POC GLUCOSE BLOOD, BY GLUCOSE MONITORING DEVICE      AMB POC HEMOGLOBIN A1C   2. Hypercholesteremia E78.00 272.0 AMB POC LIPID PROFILE   3.  Erectile disorder due to medical condition in male patient N52.1 607.84 REFERRAL TO UROLOGY   4. [facility] C61 185      Orders Placed This Encounter    REFERRAL TO UROLOGY     Referral Priority:   Routine     Referral Type: Consultation     Referral Reason:   Specialty Services Required     Referral Location:   [facility]     Referred to Provider:   Christy Ferguson MD     Number of Visits Requested:   1    AMB POC LIPID PROFILE    AMB POC GLUCOSE BLOOD, BY GLUCOSE MONITORING DEVICE    AMB POC HEMOGLOBIN A1C    atorvastatin (LIPITOR) 40 mg tablet     Sig: Take 1 Tab by mouth daily. Dispense:  30 Tab     Refill:  12     lose weight, follow low fat diet, follow low salt diet,Take 81mg aspirin daily  Patient Instructions   Babyoyehart Activation    Thank you for requesting access to Optify. Please follow the instructions below to securely access and download your online medical record. Optify allows you to send messages to your doctor, view your test results, renew your prescriptions, schedule appointments, and more. How Do I Sign Up? 1. In your internet browser, go to [email or web address]  2. Click on the First Time User? Click Here link in the Sign In box. You will be redirect to the New Member Sign Up page. 3. Enter your Optify Access Code exactly as it appears below. You will not need to use this code after youve completed the sign-up process. If you do not sign up before the expiration date, you must request a new code. Optify Access Code: Activation code not generated  Current Optify Status: Patient Declined (This is the date your Optify access code will )    4. Enter the last four digits of your Social Security Number (xxxx) and Date of Birth (mm/dd/yyyy) as indicated and click Submit. You will be taken to the next sign-up page. 5. Create a Optify ID. This will be your Optify login ID and cannot be changed, so think of one that is secure and easy to remember. 6. Create a Optify password. You can change your password at any time. 7. Enter your Password Reset Question and Answer. This can be used at a later time if you forget your password. 8. Enter your e-mail address.  You will receive e-mail notification when new information is available in [address] 9. Click Sign Up. You can now view and download portions of your medical record. 10. Click the Download Summary menu link to download a portable copy of your medical information. Additional Information    If you have questions, please visit the Frequently Asked Questions section of the Modenus website at [email or web address] Remember, Modenus is NOT to be used for urgent needs. For medical emergencies, dial 911. Follow-up Disposition:  Return in about 3 months (around 1/26/2018), or if symptoms worsen or fail to improve. I have reviewed with the patient details of the assessment and plan and all questions were answered. Relevent patient education was performed    An After Visit Summary was printed and given to the patient.

## 2017-10-27 LAB — PSA SERPL-MCNC: <0.1 NG/ML (ref 0–4)

## 2018-01-02 RX ORDER — METFORMIN HYDROCHLORIDE 1000 MG/1
TABLET ORAL
Qty: 60 TAB | Refills: 12 | Status: SHIPPED | OUTPATIENT
Start: 2018-01-02 | End: 2018-08-09 | Stop reason: SDUPTHER

## 2018-01-04 LAB
CREATININE, EXTERNAL: 0.71
HBA1C MFR BLD HPLC: 6.8 %
LDL-C, EXTERNAL: 80
MICROALBUMIN UR TEST STR-MCNC: 14 MG/DL

## 2018-01-24 ENCOUNTER — OFFICE VISIT (OUTPATIENT)
Dept: INTERNAL MEDICINE CLINIC | Age: 68
End: 2018-01-24

## 2018-01-24 VITALS
HEIGHT: 71 IN | HEART RATE: 80 BPM | DIASTOLIC BLOOD PRESSURE: 76 MMHG | TEMPERATURE: 97.9 F | BODY MASS INDEX: 33.74 KG/M2 | RESPIRATION RATE: 16 BRPM | OXYGEN SATURATION: 97 % | SYSTOLIC BLOOD PRESSURE: 110 MMHG | WEIGHT: 241 LBS

## 2018-01-24 DIAGNOSIS — E78.00 HYPERCHOLESTEREMIA: ICD-10-CM

## 2018-01-24 DIAGNOSIS — H25.012 CORTICAL AGE-RELATED CATARACT OF LEFT EYE: ICD-10-CM

## 2018-01-24 DIAGNOSIS — E11.9 CONTROLLED TYPE 2 DIABETES MELLITUS WITHOUT COMPLICATION, WITHOUT LONG-TERM CURRENT USE OF INSULIN (HCC): Primary | ICD-10-CM

## 2018-01-24 DIAGNOSIS — Z85.46 H/O PROSTATE CANCER: ICD-10-CM

## 2018-01-24 LAB
ALBUMIN UR QL STRIP: 30 MG/L
CHOLEST SERPL-MCNC: 133 MG/DL
CREATININE, URINE POC: 100 MG/DL
GLUCOSE POC: 337 MG/DL
HBA1C MFR BLD HPLC: 8.9 %
HDLC SERPL-MCNC: 55 MG/DL
LDL CHOLESTEROL POC: 69 MG/DL
MICROALBUMIN/CREAT RATIO POC: <30 MG/G
NON-HDL CHOLESTEROL, 011976: 78
TCHOL/HDL RATIO (POC): 2.4
TRIGL SERPL-MCNC: 45 MG/DL

## 2018-01-24 RX ORDER — GLIPIZIDE 5 MG/1
5 TABLET ORAL 2 TIMES DAILY
Qty: 60 TAB | Refills: 12 | Status: SHIPPED | OUTPATIENT
Start: 2018-01-24 | End: 2018-08-09 | Stop reason: SDUPTHER

## 2018-01-24 NOTE — MR AVS SNAPSHOT
[address] [place] [address],[address] [place] [id number] 
[phone] Patient: Kimberlee Pichardo MRN: [id number] Visit Information Date & Time Provider Department Dept. Phone Encounter #  
 1/24/2018  8:15 AM Danni Trinidad MD [address] [facility] [phone] [id number] Follow-up Instructions Return in about 3 months (around 4/24/2018), or if symptoms worsen or fail to improve. Upcoming Health Maintenance Date Due MICROALBUMIN Q1 12/5/1960 EYE EXAM RETINAL OR DILATED Q1 12/5/1960 GLAUCOMA SCREENING Q2Y 12/5/2015 FOBT Q 1 YEAR AGE 50-75 1/30/2018 Pneumococcal 65+ High/Highest Risk (2 of 2 - PPSV23) 3/21/2018 HEMOGLOBIN A1C Q6M 4/26/2018 MEDICARE YEARLY EXAM 4/27/2018 LIPID PANEL Q1 10/26/2018 FOOT EXAM Q1 1/24/2019 DTaP/Tdap/Td series (2 - Td) 1/24/2028 Allergies as of 1/24/2018  Review Complete On: 1/24/2018 By: Danni Trinidad MD  
 Not on File Current Immunizations  Never Reviewed No immunizations on file. Not reviewed this visit You Were Diagnosed With   
  
 Codes Comments Controlled type 2 diabetes mellitus without complication, without long-term current use of insulin ([facility]    -  Primary ICD-10-CM: E11.9 ICD-9-CM: 250.00 Hypercholesteremia     ICD-10-CM: E78.00 ICD-9-CM: 272.0 H/O prostate cancer     ICD-10-CM: Z85.46 
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 
ICD-9-CM: 366.15 Vitals BP Pulse Temp Resp Height(growth percentile) Weight(growth percentile) 110/76 80 97.9 °F (36.6 °C) (Oral) 16 5' 11\" (1.803 m) 241 lb (109.3 kg) SpO2 BMI Smoking Status 97% 33.61 kg/m2 Never Smoker BMI and BSA Data Body Mass Index Body Surface Area  
 33.61 kg/m 2 2.34 m 2 Preferred Pharmacy Pharmacy Name Phone [address] [facility] [address] Katherine Cardona [address] Your Updated Medication List  
  
   
This list is accurate as of: 1/24/18  8:39 AM.  Always use your most recent med list.  
  
  
  
  
 atorvastatin 40 mg tablet Commonly known as:  LIPITOR Take 1 Tab by mouth daily. glipiZIDE 5 mg tablet Commonly known as:  Rica Sam Take 1 Tab by mouth two (2) times a day. metFORMIN 1,000 mg tablet Commonly known as:  GLUCOPHAGE  
TAKE ONE TABLET BY MOUTH TWICE DAILY WITH MEALS Prescriptions Sent to Pharmacy Refills  
 glipiZIDE (GLUCOTROL) 5 mg tablet 12 Sig: Take 1 Tab by mouth two (2) times a day. Class: Normal  
 Pharmacy: [id number] [place] [address] Ph #: [phone] Route: Oral  
  
We Performed the Following AMB POC GLUCOSE BLOOD, BY GLUCOSE MONITORING DEVICE [44171 CPT(R)] AMB POC HEMOGLOBIN A1C [06012 CPT(R)] AMB POC LIPID PROFILE [60991 CPT(R)] AMB POC URINE, MICROALBUMIN, SEMIQUANT (3 RESULTS) [88002 CPT(R)] REFERRAL TO OPHTHALMOLOGY [REF57 Custom] Follow-up Instructions Return in about 3 months (around 4/24/2018), or if symptoms worsen or fail to improve. Referral Information Referral ID Referred By Referred To  
  
 [id number] Nela Bamberger [facility] [address] [place], [address] Visits Status Start Date End Date 1 New Request 1/24/18 1/24/19 If your referral has a status of pending review or denied, additional information will be sent to support the outcome of this decision. Patient Instructions Graftworx Activation Thank you for requesting access to Graftworx. Please follow the instructions below to securely access and download your online medical record.  Graftworx allows you to send messages to your doctor, view your test results, renew your prescriptions, schedule appointments, and more. How Do I Sign Up? 1. In your internet browser, go to [email or web address] - Payments Anytime | Anywhere 
2. Click on the First Time User? Click Here link in the Sign In box. You will be redirect to the New Member Sign Up page. 3. Enter your Riptide IO Access Code exactly as it appears below. You will not need to use this code after youve completed the sign-up process. If you do not sign up before the expiration date, you must request a new code. MyChart Access Code: Activation code not generated Current Riptide IO Status: Patient Declined (This is the date your MyChart access code will ) 4. Enter the last four digits of your Social Security Number (xxxx) and Date of Birth (mm/dd/yyyy) as indicated and click Submit. You will be taken to the next sign-up page. 5. Create a Riptide IO ID. This will be your Riptide IO login ID and cannot be changed, so think of one that is secure and easy to remember. 6. Create a Riptide IO password. You can change your password at any time. 7. Enter your Password Reset Question and Answer. This can be used at a later time if you forget your password. 8. Enter your e-mail address. You will receive e-mail notification when new information is available in [address] 9. Click Sign Up. You can now view and download portions of your medical record. 10. Click the Download Summary menu link to download a portable copy of your medical information. Additional Information If you have questions, please visit the Frequently Asked Questions section of the Riptide IO website at [email or web address] Remember, Riptide IO is NOT to be used for urgent needs. For medical emergencies, dial 911. Please provide this summary of care documentation to your next provider. Your primary care clinician is listed as Trent Price. If you have any questions after today's visit, please call [phone].

## 2018-01-24 NOTE — PROGRESS NOTES
Srinivasan You is a 79 y.o. male and presents with Diabetes; Cholesterol Problem; and Foot Exam  .  Subjective:  Diabetes Mellitus Review:  He has diabetes mellitus. Diabetic ROS - medication compliance: compliant all of the time, diabetic diet compliance: compliant all of the time, home glucose monitoring: is performed. Known diabetic complications: none  Cardiovascular risk factors: family history, dyslipidemia, diabetes mellitus, obesity, hypertension  Current diabetic medications include oral agents/   Eye exam current (within one year): no  Weight trend: stable  Prior visit with dietician: no  Current diet: \"healthy\" diet  in general  Current exercise: walking  Current monitoring regimen: home blood tests - daily  Home blood sugar records: trend: stable  Any episodes of hypoglycemia? no  Is He on ACE inhibitor or angiotensin II receptor blocker? Yes     He has bunions and callous reported. Dyslipidemia Review:  Patient presents for evaluation of lipids. Compliance with treatment thus far has been excellent. A repeat fasting lipid profile was done. The patient does not use medications that may worsen dyslipidemias (corticosteroids, progestins, anabolic steroids, diuretics, beta-blockers, amiodarone, cyclosporine, olanzapine). The patient exercises some    Review of Systems  Constitutional: negative for fevers, chills, anorexia and weight loss  Eyes:   negative for visual disturbance and irritation  ENT:   negative for tinnitus,sore throat,nasal congestion,ear pains. hoarseness  Respiratory:  negative for cough, hemoptysis, dyspnea,wheezing  CV:   negative for chest pain, palpitations, lower extremity edema  GI:   negative for nausea, vomiting, diarrhea, abdominal pain,melena  Endo:               negative for polyuria,polydipsia,polyphagia,heat intolerance  Genitourinary: negative for frequency, dysuria and hematuria  Integument:  negative for rash and pruritus  Hematologic:  negative for easy bruising and gum/nose bleeding  Musculoskel: negative for myalgias, arthralgias, back pain, muscle weakness, joint pain  Neurological:  negative for headaches, dizziness, vertigo, memory problems and gait   Behavl/Psych: negative for feelings of anxiety, depression, mood changes    Past Medical History:   Diagnosis Date    Diabetes [facility])      Past Surgical History:   Procedure Laterality Date    HX ORTHOPAEDIC  2012    lumbar    HX PROSTATECTOMY  2012    HX UROLOGICAL  2012     Social History     Social History    Marital status: UNKNOWN     Spouse name: N/A    Number of children: 2    Years of education: N/A     Social History Main Topics    Smoking status: Never Smoker    Smokeless tobacco: Never Used    Alcohol use No    Drug use: No    Sexual activity: Not Asked     Other Topics Concern     Service No    Blood Transfusions No    Caffeine Concern No    Occupational Exposure No    Hobby Hazards No    Sleep Concern No    Stress Concern No    Weight Concern No    Special Diet No    Back Care No    Exercise Yes    Bike Helmet No    Seat Belt No    Self-Exams No     Social History Narrative     History reviewed. No pertinent family history. Current Outpatient Prescriptions   Medication Sig Dispense Refill    glipiZIDE (GLUCOTROL) 5 mg tablet Take 1 Tab by mouth two (2) times a day. 60 Tab 12    metFORMIN (GLUCOPHAGE) 1,000 mg tablet TAKE ONE TABLET BY MOUTH TWICE DAILY WITH MEALS 60 Tab 12    atorvastatin (LIPITOR) 40 mg tablet Take 1 Tab by mouth daily.  30 Tab 12     Not on File    Objective:  Visit Vitals    /76    Pulse 80    Temp 97.9 °F (36.6 °C) (Oral)    Resp 16    Ht 5' 11\" (1.803 m)    Wt 241 lb (109.3 kg)    SpO2 97%    BMI 33.61 kg/m2     Physical Exam:   General appearance - alert, well appearing, and in no distress  Mental status - alert, oriented to person, place, and time  EYE-DAVIAN, EOMI, corneas normal, no foreign bodies  ENT-ENT exam normal, no neck nodes or sinus tenderness  Nose - normal and patent, no erythema, discharge or polyps  Mouth - mucous membranes moist, pharynx normal without lesions  Neck - supple, no significant adenopathy   Chest - clear to auscultation, no wheezes, rales or rhonchi, symmetric air entry   Heart - normal rate, regular rhythm, normal S1, S2, no murmurs, rubs, clicks or gallops   Abdomen - soft, nontender, nondistended, no masses or organomegaly  Lymph- no adenopathy palpable  Ext-peripheral pulses normal, no pedal edema, no clubbing or cyanosis  Skin-Warm and dry. no hyperpigmentation, vitiligo, or suspicious lesions  Neuro -alert, oriented, normal speech, no focal findings or movement disorder noted  Neck-normal C-spine, no tenderness, full ROM without pain  Feet- nail deformities and callus formation noted with good pulses noted      Results for orders placed or performed in visit on 10/26/17   PSA, DIAGNOSTIC (PROSTATE SPECIFIC AG)   Result Value Ref Range    Prostate Specific Ag <0.1 0.0 - 4.0 ng/mL   AMB POC LIPID PROFILE   Result Value Ref Range    Cholesterol (POC) 225     Triglycerides (POC) <45     HDL Cholesterol (POC) 69     LDL Cholesterol (POC) n/a MG/DL    Non-HDL Goal (POC) 156     TChol/HDL Ratio (POC) 3.3    AMB POC GLUCOSE BLOOD, BY GLUCOSE MONITORING DEVICE   Result Value Ref Range    Glucose  mg/dL   AMB POC HEMOGLOBIN A1C   Result Value Ref Range    Hemoglobin A1c (POC) 6.0 %       Assessment/Plan:    ICD-10-CM ICD-9-CM    1. Controlled type 2 diabetes mellitus without complication, without long-term current use of insulin ([facility]) E11.9 250.00 AMB POC LIPID PROFILE      AMB POC GLUCOSE BLOOD, BY GLUCOSE MONITORING DEVICE      AMB POC HEMOGLOBIN A1C      AMB POC URINE, MICROALBUMIN, SEMIQUANT (3 RESULTS)   2. Hypercholesteremia E78.00 272.0 AMB POC LIPID PROFILE      AMB POC GLUCOSE BLOOD, BY GLUCOSE MONITORING DEVICE      AMB POC HEMOGLOBIN A1C      AMB POC URINE, MICROALBUMIN, SEMIQUANT (3 RESULTS)   3.  H/O prostate cancer Z85.46 V10.46    4. Cortical age-related cataract of left eye H25.012 366.15 REFERRAL TO OPHTHALMOLOGY     Orders Placed This Encounter    REFERRAL TO OPHTHALMOLOGY     Referral Priority:   Routine     Referral Type:   Consultation     Referral Reason:   Specialty Services Required     Referral Location:   [facility] [address]     Referred to Provider:   Susan Quick MD    AMB POC LIPID PROFILE    AMB POC GLUCOSE BLOOD, BY GLUCOSE MONITORING DEVICE    AMB POC HEMOGLOBIN A1C    AMB POC URINE, MICROALBUMIN, SEMIQUANT (3 RESULTS)    glipiZIDE (GLUCOTROL) 5 mg tablet     Sig: Take 1 Tab by mouth two (2) times a day. Dispense:  60 Tab     Refill:  12     lose weight, increase physical activity, follow low fat diet, follow low salt diet,Take 81mg aspirin daily  Patient Instructions   [employer] Activation    Thank you for requesting access to [facility]. Please follow the instructions below to securely access and download your online medical record. [facility] allows you to send messages to your doctor, view your test results, renew your prescriptions, schedule appointments, and more. How Do I Sign Up? 1. In your internet browser, go to [email or web address]  2. Click on the First Time User? Click Here link in the Sign In box. You will be redirect to the New Member Sign Up page. 3. Enter your [facility] Access Code exactly as it appears below. You will not need to use this code after youve completed the sign-up process. If you do not sign up before the expiration date, you must request a new code. [facility] Access Code: Activation code not generated  Current [facility] Status: Patient Declined (This is the date your [facility] access code will )    4. Enter the last four digits of your Social Security Number (xxxx) and Date of Birth (mm/dd/yyyy) as indicated and click Submit. You will be taken to the next sign-up page. 5. Create a [facility] ID.  This will be your [facility] login ID and cannot be changed, so think of one that is secure and easy to remember. 6. Create a [employer] password. You can change your password at any time. 7. Enter your Password Reset Question and Answer. This can be used at a later time if you forget your password. 8. Enter your e-mail address. You will receive e-mail notification when new information is available in [address] 9. Click Sign Up. You can now view and download portions of your medical record. 10. Click the Download Summary menu link to download a portable copy of your medical information. Additional Information    If you have questions, please visit the Frequently Asked Questions section of the [employer] website at [email or web address] Remember, [facility] is NOT to be used for urgent needs. For medical emergencies, dial 911. Follow-up Disposition:  Return in about 3 months (around 4/24/2018), or if symptoms worsen or fail to improve. I have reviewed with the patient details of the assessment and plan and all questions were answered. Relevent patient education was performed. The most recent lab findings were reviewed with the patient. An After Visit Summary was printed and given to the patient.

## 2018-01-24 NOTE — PATIENT INSTRUCTIONS
[employer] Activation    Thank you for requesting access to [facility]. Please follow the instructions below to securely access and download your online medical record. [facility] allows you to send messages to your doctor, view your test results, renew your prescriptions, schedule appointments, and more. How Do I Sign Up? 1. In your internet browser, go to [email or web address]  2. Click on the First Time User? Click Here link in the Sign In box. You will be redirect to the New Member Sign Up page. 3. Enter your [facility] Access Code exactly as it appears below. You will not need to use this code after youve completed the sign-up process. If you do not sign up before the expiration date, you must request a new code. [facility] Access Code: Activation code not generated  Current [facility] Status: Patient Declined (This is the date your [facility] access code will )    4. Enter the last four digits of your Social Security Number (xxxx) and Date of Birth (mm/dd/yyyy) as indicated and click Submit. You will be taken to the next sign-up page. 5. Create a [facility] ID. This will be your [facility] login ID and cannot be changed, so think of one that is secure and easy to remember. 6. Create a [facility] password. You can change your password at any time. 7. Enter your Password Reset Question and Answer. This can be used at a later time if you forget your password. 8. Enter your e-mail address. You will receive e-mail notification when new information is available in [address] 9. Click Sign Up. You can now view and download portions of your medical record. 10. Click the Download Summary menu link to download a portable copy of your medical information. Additional Information    If you have questions, please visit the Frequently Asked Questions section of the [facility] website at [email or web address] Remember, [facility] is NOT to be used for urgent needs. For medical emergencies, dial 911.

## 2018-04-25 ENCOUNTER — OFFICE VISIT (OUTPATIENT)
Dept: INTERNAL MEDICINE CLINIC | Age: 68
End: 2018-04-25

## 2018-04-25 VITALS
TEMPERATURE: 98.4 F | DIASTOLIC BLOOD PRESSURE: 64 MMHG | HEART RATE: 94 BPM | HEIGHT: 71 IN | SYSTOLIC BLOOD PRESSURE: 110 MMHG | RESPIRATION RATE: 20 BRPM | OXYGEN SATURATION: 100 % | WEIGHT: 230 LBS | BODY MASS INDEX: 32.2 KG/M2

## 2018-04-25 DIAGNOSIS — E11.9 TYPE 2 DIABETES MELLITUS WITHOUT COMPLICATION, WITHOUT LONG-TERM CURRENT USE OF INSULIN (HCC): Primary | ICD-10-CM

## 2018-04-25 DIAGNOSIS — E78.00 HYPERCHOLESTEREMIA: ICD-10-CM

## 2018-04-25 DIAGNOSIS — Z85.46 H/O PROSTATE CANCER: ICD-10-CM

## 2018-04-25 LAB
CHOLEST SERPL-MCNC: 130 MG/DL
GLUCOSE POC: 292 MG/DL
HBA1C MFR BLD HPLC: 11.3 %
HDLC SERPL-MCNC: 41 MG/DL
LDL CHOLESTEROL POC: 79 MG/DL
NON-HDL GOAL (POC): 89
TCHOL/HDL RATIO (POC): 3.2
TRIGL SERPL-MCNC: 52 MG/DL

## 2018-04-25 NOTE — PATIENT INSTRUCTIONS
[employer] Activation    Thank you for requesting access to [facility]. Please follow the instructions below to securely access and download your online medical record. [facility] allows you to send messages to your doctor, view your test results, renew your prescriptions, schedule appointments, and more. How Do I Sign Up? 1. In your internet browser, go to [email or web address] You [email or web address]  2. Click on the First Time User? Click Here link in the Sign In box. You will be redirect to the New Member Sign Up page. 3. Enter your [facility] Access Code exactly as it appears below. You will not need to use this code after youve completed the sign-up process. If you do not sign up before the expiration date, you must request a new code. [facility] Access Code: Activation code not generated  Current [facility] Status: Patient Declined (This is the date your [facility] access code will )    4. Enter the last four digits of your Social Security Number (xxxx) and Date of Birth (mm/dd/yyyy) as indicated and click Submit. You will be taken to the next sign-up page. 5. Create a [facility] ID. This will be your [facility] login ID and cannot be changed, so think of one that is secure and easy to remember. 6. Create a [facility] password. You can change your password at any time. 7. Enter your Password Reset Question and Answer. This can be used at a later time if you forget your password. 8. Enter your e-mail address. You will receive e-mail notification when new information is available in [address] 9. Click Sign Up. You can now view and download portions of your medical record. 10. Click the Download Summary menu link to download a portable copy of your medical information. Additional Information    If you have questions, please visit the Frequently Asked Questions section of the [facility] website at [email or web address] Remember, [facility] is NOT to be used for urgent needs. For medical emergencies, dial 911.

## 2018-04-25 NOTE — PROGRESS NOTES
Glen Silveira is a 79 y.o. male and presents with Cholesterol Problem; Diabetes; and Prostate Cancer (history)  . Subjective:  Diabetes Mellitus Review:  He has diabetes mellitus. Diabetic ROS - medication compliance: compliant all of the time, diabetic diet noncompliance: , home glucose monitoring: is not performed. Known diabetic complications: none  Cardiovascular risk factors: family history, dyslipidemia, diabetes mellitus, obesity, hypertension  Current diabetic medications include oral agents/   Eye exam current (within one year): no  Weight trend: stable  Prior visit with dietician: no  Current diet: \"healthy\" diet  in general  Current exercise: walking  Current monitoring regimen: home blood tests - daily  Home blood sugar records: trend: stable  Any episodes of hypoglycemia? no  Is He on ACE inhibitor or angiotensin II receptor blocker? Yes     He needs a prostate cancer referral    Dyslipidemia Review:  Patient presents for evaluation of lipids. Compliance with treatment thus far has been excellent. A repeat fasting lipid profile was done. The patient does not use medications that may worsen dyslipidemias (corticosteroids, progestins, anabolic steroids, diuretics, beta-blockers, amiodarone, cyclosporine, olanzapine). The patient exercises some    Review of Systems  Constitutional: negative for fevers, chills, anorexia and weight loss  Eyes:   negative for visual disturbance and irritation  ENT:   negative for tinnitus,sore throat,nasal congestion,ear pains. hoarseness  Respiratory:  negative for cough, hemoptysis, dyspnea,wheezing  CV:   negative for chest pain, palpitations, lower extremity edema  GI:   negative for nausea, vomiting, diarrhea, abdominal pain,melena  Endo:               negative for polyuria,polydipsia,polyphagia,heat intolerance  Genitourinary: negative for frequency, dysuria and hematuria  Integument:  negative for rash and pruritus  Hematologic:  negative for easy bruising and gum/nose bleeding  Musculoskel: negative for myalgias, arthralgias, back pain, muscle weakness, joint pain  Neurological:  negative for headaches, dizziness, vertigo, memory problems and gait   Behavl/Psych: negative for feelings of anxiety, depression, mood changes    Past Medical History:   Diagnosis Date    Diabetes [facility])      Past Surgical History:   Procedure Laterality Date    HX ORTHOPAEDIC  2012    lumbar    HX PROSTATECTOMY  2012    HX UROLOGICAL  2012     Social History     Social History    Marital status: UNKNOWN     Spouse name: N/A    Number of children: 2    Years of education: N/A     Social History Main Topics    Smoking status: Never Smoker    Smokeless tobacco: Never Used    Alcohol use No    Drug use: No    Sexual activity: Not Asked     Other Topics Concern     Service No    Blood Transfusions No    Caffeine Concern No    Occupational Exposure No    Hobby Hazards No    Sleep Concern No    Stress Concern No    Weight Concern No    Special Diet No    Back Care No    Exercise Yes    Bike Helmet No    Seat Belt No    Self-Exams No     Social History Narrative     History reviewed. No pertinent family history. Current Outpatient Prescriptions   Medication Sig Dispense Refill    glipiZIDE (GLUCOTROL) 5 mg tablet Take 1 Tab by mouth two (2) times a day. 60 Tab 12    metFORMIN (GLUCOPHAGE) 1,000 mg tablet TAKE ONE TABLET BY MOUTH TWICE DAILY WITH MEALS 60 Tab 12    atorvastatin (LIPITOR) 40 mg tablet Take 1 Tab by mouth daily.  30 Tab 12     No Known Allergies    Objective:  Visit Vitals    /64 (BP 1 Location: Right arm, BP Patient Position: Sitting)    Pulse 94    Temp 98.4 °F (36.9 °C) (Oral)    Resp 20    Ht 5' 11\" (1.803 m)    Wt 230 lb (104.3 kg)    SpO2 100%    BMI 32.08 kg/m2     Physical Exam:   General appearance - alert, well appearing, and in no distress  Mental status - alert, oriented to person, place, and time  EYE-DAVIAN, EOMI, corneas normal, no foreign bodies  ENT-ENT exam normal, no neck nodes or sinus tenderness  Nose - normal and patent, no erythema, discharge or polyps  Mouth - mucous membranes moist, pharynx normal without lesions  Neck - supple, no significant adenopathy   Chest - clear to auscultation, no wheezes, rales or rhonchi, symmetric air entry   Heart - normal rate, regular rhythm, normal S1, S2, no murmurs, rubs, clicks or gallops   Abdomen - soft, nontender, nondistended, no masses or organomegaly  Lymph- no adenopathy palpable  Ext-peripheral pulses normal, no pedal edema, no clubbing or cyanosis  Skin-Warm and dry. no hyperpigmentation, vitiligo, or suspicious lesions  Neuro -alert, oriented, normal speech, no focal findings or movement disorder noted  Neck-normal C-spine, no tenderness, full ROM without pain  Feet- nail deformities and callus formation noted with good pulses noted      Results for orders placed or performed in visit on 04/25/18   AMB POC GLUCOSE BLOOD, BY GLUCOSE MONITORING DEVICE   Result Value Ref Range    Glucose  mg/dL   AMB POC HEMOGLOBIN A1C   Result Value Ref Range    Hemoglobin A1c (POC) 11.3 %   AMB POC LIPID PROFILE   Result Value Ref Range    Cholesterol (POC) 130     Triglycerides (POC) 52     HDL Cholesterol (POC) 41     LDL Cholesterol (POC) 79 MG/DL    Non-HDL Goal (POC) 89     TChol/HDL Ratio (POC) 3.2        Assessment/Plan:    ICD-10-CM ICD-9-CM    1. Type 2 diabetes mellitus without complication, without long-term current use of insulin ([facility]) E11.9 250.00 AMB POC GLUCOSE BLOOD, BY GLUCOSE MONITORING DEVICE      AMB POC HEMOGLOBIN A1C      CBC W/O DIFF      METABOLIC PANEL, COMPREHENSIVE   2. H/O prostate cancer Z85.46 V10.46    3.  Hypercholesteremia E78.00 272.0 AMB POC LIPID PROFILE     Orders Placed This Encounter    CBC W/O DIFF    METABOLIC PANEL, COMPREHENSIVE    AMB POC GLUCOSE BLOOD, BY GLUCOSE MONITORING DEVICE    AMB POC HEMOGLOBIN A1C    AMB POC LIPID PROFILE     lose weight, increase physical activity, follow low fat diet, follow low salt diet,Take 81mg aspirin daily  Patient Instructions   MyChart Activation    Thank you for requesting access to [facility]. Please follow the instructions below to securely access and download your online medical record. [facility] allows you to send messages to your doctor, view your test results, renew your prescriptions, schedule appointments, and more. How Do I Sign Up? 1. In your internet browser, go to [email or web address]  2. Click on the First Time User? Click Here link in the Sign In box. You will be redirect to the New Member Sign Up page. 3. Enter your [facility] Access Code exactly as it appears below. You will not need to use this code after youve completed the sign-up process. If you do not sign up before the expiration date, you must request a new code. [facility] Access Code: Activation code not generated  Current [facility] Status: Patient Declined (This is the date your [facility] access code will )    4. Enter the last four digits of your Social Security Number (xxxx) and Date of Birth (mm/dd/yyyy) as indicated and click Submit. You will be taken to the next sign-up page. 5. Create a [facility] ID. This will be your [facility] login ID and cannot be changed, so think of one that is secure and easy to remember. 6. Create a [employer] password. You can change your password at any time. 7. Enter your Password Reset Question and Answer. This can be used at a later time if you forget your password. 8. Enter your e-mail address. You will receive e-mail notification when new information is available in [address] 9. Click Sign Up. You can now view and download portions of your medical record. 10. Click the Download Summary menu link to download a portable copy of your medical information.     Additional Information    If you have questions, please visit the Frequently Asked Questions section of the [facility] website at [email or web address] Remember, [facility] is NOT to be used for urgent needs. For medical emergencies, dial 911. Follow-up Disposition:  Return in about 4 weeks (around 5/23/2018), or if symptoms worsen or fail to improve. I have reviewed with the patient details of the assessment and plan and all questions were answered. Relevent patient education was performed. The most recent lab findings were reviewed with the patient. An After Visit Summary was printed and given to the patient.

## 2018-04-25 NOTE — PROGRESS NOTES
1. Have you been to the ER, urgent care clinic since your last visit? Hospitalized since your last visit? No    2. Have you seen or consulted any other health care providers outside of the [address] [place] [address] since your last visit? Include any pap smears or colon screening.  No

## 2018-04-25 NOTE — MR AVS SNAPSHOT
[address] [place] [address] 
[phone] Patient: Glen Silveira MRN: [id number] ZPD:16/3/3464 Visit Information Date & Time Provider Department Dept. Phone Encounter #  
 4/25/2018  8:15 AM Glenna Miguel MD 140[address] [facility] [phone] [id number] Follow-up Instructions Return in about 4 weeks (around 5/23/2018), or if symptoms worsen or fail to improve. Upcoming Health Maintenance Date Due  
 EYE EXAM RETINAL OR DILATED Q1 12/5/1960 GLAUCOMA SCREENING Q2Y 12/5/2015 FOBT Q 1 YEAR AGE 50-75 1/30/2018 Pneumococcal 65+ High/Highest Risk (2 of 2 - PPSV23) 3/21/2018 MEDICARE YEARLY EXAM 4/27/2018 HEMOGLOBIN A1C Q6M 7/24/2018 FOOT EXAM Q1 1/24/2019 MICROALBUMIN Q1 1/24/2019 LIPID PANEL Q1 1/24/2019 DTaP/Tdap/Td series (2 - Td) 1/24/2028 Allergies as of 4/25/2018  Review Complete On: 4/25/2018 By: Glenna Miguel MD  
 No Known Allergies Current Immunizations  Never Reviewed No immunizations on file. Not reviewed this visit You Were Diagnosed With   
  
 Codes Comments Type 2 diabetes mellitus without complication, without long-term current use of insulin (HCC)    -  Primary ICD-10-CM: E11.9 ICD-9-CM: 250.00 H/O prostate cancer     ICD-10-CM: Z85.46 
ICD-9-CM: V10.46 Hypercholesteremia     ICD-10-CM: E78.00 ICD-9-CM: 272.0 Vitals BP Pulse Temp Resp Height(growth percentile) Weight(growth percentile) 110/64 (BP 1 Location: Right arm, BP Patient Position: Sitting) 94 98.4 °F (36.9 °C) (Oral) 20 5' 11\" (1.803 m) 230 lb (104.3 kg) SpO2 BMI Smoking Status 100% 32.08 kg/m2 Never Smoker BMI and BSA Data Body Mass Index Body Surface Area 32.08 kg/m 2 2.29 m 2 Preferred Pharmacy Pharmacy Name Phone [address] [facility] [address] 55 [facility] Your Updated Medication List  
  
   
This list is accurate as of 18  8:18 AM.  Always use your most recent med list.  
  
  
  
  
 atorvastatin 40 mg tablet Commonly known as:  LIPITOR Take 1 Tab by mouth daily. glipiZIDE 5 mg tablet Commonly known as:  Dinora Stade Take 1 Tab by mouth two (2) times a day. metFORMIN 1,000 mg tablet Commonly known as:  GLUCOPHAGE  
TAKE ONE TABLET BY MOUTH TWICE DAILY WITH MEALS We Performed the Following AMB POC GLUCOSE BLOOD, BY GLUCOSE MONITORING DEVICE [91430 CPT(R)] AMB POC HEMOGLOBIN A1C [78310 CPT(R)] AMB POC LIPID PROFILE [13743 CPT(R)] CBC W/O DIFF [ CPT(R)] METABOLIC PANEL, COMPREHENSIVE [56595 CPT(R)] Follow-up Instructions Return in about 4 weeks (around 2018), or if symptoms worsen or fail to improve. Patient Instructions [employer] Activation Thank you for requesting access to [employer]. Please follow the instructions below to securely access and download your online medical record. [employer] allows you to send messages to your doctor, view your test results, renew your prescriptions, schedule appointments, and more. How Do I Sign Up? 1. In your internet browser, go to [email or web address] 
2. Click on the First Time User? Click Here link in the Sign In box. You will be redirect to the New Member Sign Up page. 3. Enter your [employer] Access Code exactly as it appears below. You will not need to use this code after youve completed the sign-up process. If you do not sign up before the expiration date, you must request a new code. [employer] Access Code: Activation code not generated Current [employer] Status: Patient Declined (This is the date your [employer] access code will ) 4. Enter the last four digits of your Social Security Number (xxxx) and Date of Birth (mm/dd/yyyy) as indicated and click Submit. You will be taken to the next sign-up page. 5. Create a Lavantet ID. This will be your [employer] login ID and cannot be changed, so think of one that is secure and easy to remember. 6. Create a [employer] password. You can change your password at any time. 7. Enter your Password Reset Question and Answer. This can be used at a later time if you forget your password. 8. Enter your e-mail address. You will receive e-mail notification when new information is available in [address] 9. Click Sign Up. You can now view and download portions of your medical record. 10. Click the Download Summary menu link to download a portable copy of your medical information. Additional Information If you have questions, please visit the Frequently Asked Questions section of the [employer] website at [email or web address] Remember, [employer] is NOT to be used for urgent needs. For medical emergencies, dial 911. Please provide this summary of care documentation to your next provider. Your primary care clinician is listed as Romulo Bond. If you have any questions after today's visit, please call [phone].

## 2018-04-26 LAB
ALBUMIN SERPL-MCNC: 4.4 G/DL (ref 3.6–4.8)
ALBUMIN/GLOB SERPL: 1.8 {RATIO} (ref 1.2–2.2)
ALP SERPL-CCNC: 73 IU/L (ref 39–117)
ALT SERPL-CCNC: 29 IU/L (ref 0–44)
AST SERPL-CCNC: 40 IU/L (ref 0–40)
BILIRUB SERPL-MCNC: 1.4 MG/DL (ref 0–1.2)
BUN SERPL-MCNC: 11 MG/DL (ref 8–27)
BUN/CREAT SERPL: 15 (ref 10–24)
CALCIUM SERPL-MCNC: 9.2 MG/DL (ref 8.6–10.2)
CHLORIDE SERPL-SCNC: 99 MMOL/L (ref 96–106)
CO2 SERPL-SCNC: 25 MMOL/L (ref 18–29)
CREAT SERPL-MCNC: 0.71 MG/DL (ref 0.76–1.27)
ERYTHROCYTE [DISTWIDTH] IN BLOOD BY AUTOMATED COUNT: 13.6 % (ref 12.3–15.4)
GFR SERPLBLD CREATININE-BSD FMLA CKD-EPI: 112 ML/MIN/1.73
GFR SERPLBLD CREATININE-BSD FMLA CKD-EPI: 97 ML/MIN/1.73
GLOBULIN SER CALC-MCNC: 2.4 G/DL (ref 1.5–4.5)
GLUCOSE SERPL-MCNC: 267 MG/DL (ref 65–99)
HCT VFR BLD AUTO: 37.8 % (ref 37.5–51)
HGB BLD-MCNC: 12.6 G/DL (ref 13–17.7)
MCH RBC QN AUTO: 29.6 PG (ref 26.6–33)
MCHC RBC AUTO-ENTMCNC: 33.3 G/DL (ref 31.5–35.7)
MCV RBC AUTO: 89 FL (ref 79–97)
PLATELET # BLD AUTO: 244 X10E3/UL (ref 150–379)
POTASSIUM SERPL-SCNC: 4.4 MMOL/L (ref 3.5–5.2)
PROT SERPL-MCNC: 6.8 G/DL (ref 6–8.5)
RBC # BLD AUTO: 4.25 X10E6/UL (ref 4.14–5.8)
SODIUM SERPL-SCNC: 139 MMOL/L (ref 134–144)
WBC # BLD AUTO: 5.2 X10E3/UL (ref 3.4–10.8)

## 2018-05-23 ENCOUNTER — OFFICE VISIT (OUTPATIENT)
Dept: INTERNAL MEDICINE CLINIC | Age: 68
End: 2018-05-23

## 2018-05-23 VITALS
HEART RATE: 78 BPM | RESPIRATION RATE: 16 BRPM | DIASTOLIC BLOOD PRESSURE: 70 MMHG | SYSTOLIC BLOOD PRESSURE: 106 MMHG | OXYGEN SATURATION: 94 % | BODY MASS INDEX: 33.04 KG/M2 | TEMPERATURE: 98.4 F | HEIGHT: 71 IN | WEIGHT: 236 LBS

## 2018-05-23 DIAGNOSIS — Z85.46 H/O PROSTATE CANCER: ICD-10-CM

## 2018-05-23 DIAGNOSIS — E78.00 HYPERCHOLESTEREMIA: ICD-10-CM

## 2018-05-23 DIAGNOSIS — E11.9 TYPE 2 DIABETES MELLITUS WITHOUT COMPLICATION, WITHOUT LONG-TERM CURRENT USE OF INSULIN (HCC): Primary | ICD-10-CM

## 2018-05-23 DIAGNOSIS — Z12.11 SCREEN FOR COLON CANCER: ICD-10-CM

## 2018-05-23 LAB — GLUCOSE POC: 180 MG/DL

## 2018-05-23 NOTE — PATIENT INSTRUCTIONS
[employer] Activation    Thank you for requesting access to [employer]. Please follow the instructions below to securely access and download your online medical record. [employer] allows you to send messages to your doctor, view your test results, renew your prescriptions, schedule appointments, and more. How Do I Sign Up? 1. In your internet browser, go to [email or web address]  2. Click on the First Time User? Click Here link in the Sign In box. You will be redirect to the New Member Sign Up page. 3. Enter your [employer] Access Code exactly as it appears below. You will not need to use this code after youve completed the sign-up process. If you do not sign up before the expiration date, you must request a new code. [employer] Access Code: Activation code not generated  Current [employer] Status: Patient Declined (This is the date your [employer] access code will )    4. Enter the last four digits of your Social Security Number (xxxx) and Date of Birth (mm/dd/yyyy) as indicated and click Submit. You will be taken to the next sign-up page. 5. Create a [employer] ID. This will be your [employer] login ID and cannot be changed, so think of one that is secure and easy to remember. 6. Create a [employer] password. You can change your password at any time. 7. Enter your Password Reset Question and Answer. This can be used at a later time if you forget your password. 8. Enter your e-mail address. You will receive e-mail notification when new information is available in [address] 9. Click Sign Up. You can now view and download portions of your medical record. 10. Click the Download Summary menu link to download a portable copy of your medical information. Additional Information    If you have questions, please visit the Frequently Asked Questions section of the [employer] website at [email or web address] Remember, [employer] is NOT to be used for urgent needs. For medical emergencies, dial 911.

## 2018-05-23 NOTE — PROGRESS NOTES
Oscar George is a 79 y.o. male and presents with Annual Wellness Visit and Diabetes  . Subjective:  Diabetes Mellitus Review:  He has diabetes mellitus. Diabetic ROS - medication compliance: compliant all of the time, diabetic diet compliance: compliant all of the time, home glucose monitoring: is performed. Known diabetic complications: none  Cardiovascular risk factors: family history, dyslipidemia, diabetes mellitus, obesity, hypertension  Current diabetic medications include oral agents  Eye exam current (within one year): no  Weight trend: stable  Prior visit with dietician: no  Current diet: \"healthy\" diet  in general  Current exercise: walking  Current monitoring regimen: home blood tests - daily  Home blood sugar records: trend: stable  Any episodes of hypoglycemia? no  Is He on ACE inhibitor or angiotensin II receptor blocker? Yes     Dyslipidemia Review:  Patient presents for evaluation of lipids. Compliance with treatment thus far has been excellent. A repeat fasting lipid profile was done. The patient does not use medications that may worsen dyslipidemias (corticosteroids, progestins, anabolic steroids, diuretics, beta-blockers, amiodarone, cyclosporine, olanzapine). The patient exercises some          Oscar George is a 79 y.o. male and presents for annual Medicare Wellness Visit. Problem List: Reviewed with patient and discussed risk factors. There is no problem list on file for this patient.       Current medical providers:  Patient Care Team:  Balta Garrison MD as PCP - General (Internal Medicine)    PSH: Reviewed with patient  Past Surgical History:   Procedure Laterality Date    HX ORTHOPAEDIC  2012    lumbar    HX PROSTATECTOMY  2012    HX UROLOGICAL  2012        SH: Reviewed with patient  Social History   Substance Use Topics    Smoking status: Never Smoker    Smokeless tobacco: Never Used    Alcohol use No       FH: Reviewed with patient  No family history on file.    Medications/Allergies: Reviewed with patient  Current Outpatient Prescriptions on File Prior to Visit   Medication Sig Dispense Refill    glipiZIDE (GLUCOTROL) 5 mg tablet Take 1 Tab by mouth two (2) times a day. 60 Tab 12    metFORMIN (GLUCOPHAGE) 1,000 mg tablet TAKE ONE TABLET BY MOUTH TWICE DAILY WITH MEALS 60 Tab 12    atorvastatin (LIPITOR) 40 mg tablet Take 1 Tab by mouth daily. 30 Tab 12     No current facility-administered medications on file prior to visit. No Known Allergies    Objective:  Visit Vitals    /70    Pulse 78    Temp 98.4 °F (36.9 °C) (Oral)    Resp 16    Ht 5' 11\" (1.803 m)    Wt 236 lb (107 kg)    SpO2 94%    BMI 32.92 kg/m2    Body mass index is 32.92 kg/(m^2). Assessment of cognitive impairment: Alert and oriented x 3    Depression Screen:   PHQ over the last two weeks 4/25/2018   PHQ Not Done Patient Decline   Little interest or pleasure in doing things Not at all   Feeling down, depressed or hopeless Not at all   Total Score PHQ 2 0     Depression Review:  Patient is seen for screen of depression,denies anhedonia, weight gain, insomnia, hypersomnia, psychomotor agitation, psychomotor retardation, fatigue, feelings of worthlessness/guilt, difficulty concentrating, hopelessness, impaired memory and recurrent thoughts of death Treatment includes no medication   She denies recurrent thoughts of death and suicidal thoughts without plan. Fall Risk Assessment:    Fall Risk Assessment, last 12 mths 4/25/2018   Able to walk? Yes   Fall in past 12 months? No       Functional Ability:   Does the patient exhibit a steady gait? yes   How long did it take the patient to get up and walk from a sitting position? seconds   Is the patient self reliant?  (ie can do own laundry, meals, household chores)  yes     Does the patient handle his/her own medications? yes     Does the patient handle his/her own money?    yes     Is the patients home safe (ie good lighting, handrails on stairs and bath, etc.)? yes     Did you notice or did patient express any hearing difficulties? no     Did you notice or did patient express any vision difficulties?   no     Were distance and reading eye charts used? no       Advance Care Planning:   Patient was offered the opportunity to discuss advance care planning:  yes     Does patient have an Advance Directive:  no   If no, did you provide information on Caring Connections? yes       Plan:      Orders Placed This Encounter    OCCULT BLOOD, IMMUNOASSAY (FIT)    REFERRAL TO OPHTHALMOLOGY    AMB POC GLUCOSE BLOOD, BY GLUCOSE MONITORING DEVICE       Health Maintenance   Topic Date Due    EYE EXAM RETINAL OR DILATED Q1  12/05/1960    GLAUCOMA SCREENING Q2Y  12/05/2015    FOBT Q 1 YEAR AGE 50-75  01/30/2018    Pneumococcal 65+ High/Highest Risk (2 of 2 - PPSV23) 03/21/2018    Influenza Age 5 to Adult  08/01/2018    HEMOGLOBIN A1C Q6M  10/25/2018    FOOT EXAM Q1  01/24/2019    MICROALBUMIN Q1  01/24/2019    LIPID PANEL Q1  04/25/2019    MEDICARE YEARLY EXAM  05/24/2019    DTaP/Tdap/Td series (2 - Td) 01/24/2028    Hepatitis C Screening  Completed    ZOSTER VACCINE AGE 60>  Addressed       *Patient verbalized understanding and agreement with the plan. A copy of the After Visit Summary with personalized health plan was given to the patient today. Review of Systems  Constitutional: negative for fevers, chills, anorexia and weight loss  Eyes:   negative for visual disturbance and irritation  ENT:   negative for tinnitus,sore throat,nasal congestion,ear pains. hoarseness  Respiratory:  negative for cough, hemoptysis, dyspnea,wheezing  CV:   negative for chest pain, palpitations, lower extremity edema  GI:   negative for nausea, vomiting, diarrhea, abdominal pain,melena  Endo:               negative for polyuria,polydipsia,polyphagia,heat intolerance  Genitourinary: negative for frequency, dysuria and hematuria  Integument: negative for rash and pruritus  Hematologic:  negative for easy bruising and gum/nose bleeding  Musculoskel: negative for myalgias, arthralgias, back pain, muscle weakness, joint pain  Neurological:  negative for headaches, dizziness, vertigo, memory problems and gait   Behavl/Psych: negative for feelings of anxiety, depression, mood changes    Past Medical History:   Diagnosis Date    Diabetes [facility])      Past Surgical History:   Procedure Laterality Date    HX ORTHOPAEDIC  2012    lumbar    HX PROSTATECTOMY  2012    HX UROLOGICAL  2012     Social History     Social History    Marital status: UNKNOWN     Spouse name: N/A    Number of children: 2    Years of education: N/A     Social History Main Topics    Smoking status: Never Smoker    Smokeless tobacco: Never Used    Alcohol use No    Drug use: No    Sexual activity: Not Asked     Other Topics Concern     Service No    Blood Transfusions No    Caffeine Concern No    Occupational Exposure No    Hobby Hazards No    Sleep Concern No    Stress Concern No    Weight Concern No    Special Diet No    Back Care No    Exercise Yes    Bike Helmet No    Seat Belt No    Self-Exams No     Social History Narrative     No family history on file. Current Outpatient Prescriptions   Medication Sig Dispense Refill    glipiZIDE (GLUCOTROL) 5 mg tablet Take 1 Tab by mouth two (2) times a day. 60 Tab 12    metFORMIN (GLUCOPHAGE) 1,000 mg tablet TAKE ONE TABLET BY MOUTH TWICE DAILY WITH MEALS 60 Tab 12    atorvastatin (LIPITOR) 40 mg tablet Take 1 Tab by mouth daily.  30 Tab 12     No Known Allergies    Objective:  Visit Vitals    /70    Pulse 78    Temp 98.4 °F (36.9 °C) (Oral)    Resp 16    Ht 5' 11\" (1.803 m)    Wt 236 lb (107 kg)    SpO2 94%    BMI 32.92 kg/m2     Physical Exam:   General appearance - alert, well appearing, and in no distress  Mental status - alert, oriented to person, place, and time  EYE-DAVIAN, EOMI, corneas normal, no foreign bodies  ENT-ENT exam normal, no neck nodes or sinus tenderness  Nose - normal and patent, no erythema, discharge or polyps  Mouth - mucous membranes moist, pharynx normal without lesions  Neck - supple, no significant adenopathy   Chest - clear to auscultation, no wheezes, rales or rhonchi, symmetric air entry   Heart - normal rate, regular rhythm, normal S1, S2, no murmurs, rubs, clicks or gallops   Abdomen - soft, nontender, nondistended, no masses or organomegaly  Lymph- no adenopathy palpable  Ext-peripheral pulses normal, no pedal edema, no clubbing or cyanosis  Skin-Warm and dry. no hyperpigmentation, vitiligo, or suspicious lesions  Neuro -alert, oriented, normal speech, no focal findings or movement disorder noted  Neck-normal C-spine, no tenderness, full ROM without pain  Feet-no nail deformities or callus formation with good pulses noted      Results for orders placed or performed in visit on 04/25/18   CBC W/O DIFF   Result Value Ref Range    WBC 5.2 3.4 - 10.8 x10E3/uL    RBC 4.25 4.14 - 5.80 x10E6/uL    HGB 12.6 (L) 13.0 - 17.7 g/dL    HCT 37.8 37.5 - 51.0 %    MCV 89 79 - 97 fL    MCH 29.6 26.6 - 33.0 pg    MCHC 33.3 31.5 - 35.7 g/dL    RDW 13.6 12.3 - 15.4 %    PLATELET 228 961 - 689 E30L0/OQ   METABOLIC PANEL, COMPREHENSIVE   Result Value Ref Range    Glucose 267 (H) 65 - 99 mg/dL    BUN 11 8 - 27 mg/dL    Creatinine 0.71 (L) 0.76 - 1.27 mg/dL    GFR est non-AA 97 >59 mL/min/1.73    GFR est  >59 mL/min/1.73    BUN/Creatinine ratio 15 10 - 24    Sodium 139 134 - 144 mmol/L    Potassium 4.4 3.5 - 5.2 mmol/L    Chloride 99 96 - 106 mmol/L    CO2 25 18 - 29 mmol/L    Calcium 9.2 8.6 - 10.2 mg/dL    Protein, total 6.8 6.0 - 8.5 g/dL    Albumin 4.4 3.6 - 4.8 g/dL    GLOBULIN, TOTAL 2.4 1.5 - 4.5 g/dL    A-G Ratio 1.8 1.2 - 2.2    Bilirubin, total 1.4 (H) 0.0 - 1.2 mg/dL    Alk.  phosphatase 73 39 - 117 IU/L    AST (SGOT) 40 0 - 40 IU/L    ALT (SGPT) 29 0 - 44 IU/L   AMB POC GLUCOSE BLOOD, BY GLUCOSE MONITORING DEVICE   Result Value Ref Range    Glucose  mg/dL   AMB POC HEMOGLOBIN A1C   Result Value Ref Range    Hemoglobin A1c (POC) 11.3 %   AMB POC LIPID PROFILE   Result Value Ref Range    Cholesterol (POC) 130     Triglycerides (POC) 52     HDL Cholesterol (POC) 41     LDL Cholesterol (POC) 79 MG/DL    Non-HDL Goal (POC) 89     TChol/HDL Ratio (POC) 3.2        Assessment/Plan:    ICD-10-CM ICD-9-CM    1. Type 2 diabetes mellitus without complication, without long-term current use of insulin (HCC) E11.9 250.00 AMB POC GLUCOSE BLOOD, BY GLUCOSE MONITORING DEVICE      REFERRAL TO OPHTHALMOLOGY   2. Screen for colon cancer Z12.11 V76.51 OCCULT BLOOD, IMMUNOASSAY (FIT)   3. Hypercholesteremia E78.00 272.0    4. H/O prostate cancer Z85.46 V10.46      Orders Placed This Encounter    OCCULT BLOOD, IMMUNOASSAY (FIT)    REFERRAL TO OPHTHALMOLOGY     Referral Priority:   Routine     Referral Type:   Consultation     Referral Reason:   Specialty Services Required     Referral Location:   [facility]     Referred to Provider:   Franc Mayorga MD    AMB POC GLUCOSE BLOOD, BY GLUCOSE MONITORING DEVICE     lose weight, increase physical activity, follow low fat diet, follow low salt diet  Patient Instructions   [employer] Activation    Thank you for requesting access to [employer]. Please follow the instructions below to securely access and download your online medical record. [employer] allows you to send messages to your doctor, view your test results, renew your prescriptions, schedule appointments, and more. How Do I Sign Up? 1. In your internet browser, go to [email or web address]  2. Click on the First Time User? Click Here link in the Sign In box. You will be redirect to the New Member Sign Up page. 3. Enter your [employer] Access Code exactly as it appears below. You will not need to use this code after youve completed the sign-up process.  If you do not sign up before the expiration date, you must request a new code. [employer] Access Code: Activation code not generated  Current [employer] Status: Patient Declined (This is the date your [employer] access code will )    4. Enter the last four digits of your Social Security Number (xxxx) and Date of Birth (mm/dd/yyyy) as indicated and click Submit. You will be taken to the next sign-up page. 5. Create a Adynxxt ID. This will be your [employer] login ID and cannot be changed, so think of one that is secure and easy to remember. 6. Create a [employer] password. You can change your password at any time. 7. Enter your Password Reset Question and Answer. This can be used at a later time if you forget your password. 8. Enter your e-mail address. You will receive e-mail notification when new information is available in [address] 9. Click Sign Up. You can now view and download portions of your medical record. 10. Click the Download Summary menu link to download a portable copy of your medical information. Additional Information    If you have questions, please visit the Frequently Asked Questions section of the [employer] website at [email or web address] Remember, [employer] is NOT to be used for urgent needs. For medical emergencies, dial 911. Follow-up Disposition:  Return in about 3 months (around 2018), or if symptoms worsen or fail to improve. I have reviewed with the patient details of the assessment and plan and all questions were answered. Relevent patient education was performed    An After Visit Summary was printed and given to the patient.

## 2018-05-23 NOTE — MR AVS SNAPSHOT
[address] [place] [address],[address] [place] [id number] 
[phone] Patient: Debora Stubbs MRN: [id number] OUP:23/0/3111 Visit Information Date & Time Provider Department Dept. Phone Encounter #  
 5/23/2018  8:00 AM Naldo Castelan MD [address] [place] [phone] [id number] Follow-up Instructions Return in about 3 months (around 8/23/2018), or if symptoms worsen or fail to improve. Upcoming Health Maintenance Date Due  
 EYE EXAM RETINAL OR DILATED Q1 12/5/1960 GLAUCOMA SCREENING Q2Y 12/5/2015 FOBT Q 1 YEAR AGE 50-75 1/30/2018 Pneumococcal 65+ High/Highest Risk (2 of 2 - PPSV23) 3/21/2018 Influenza Age 5 to Adult 8/1/2018 HEMOGLOBIN A1C Q6M 10/25/2018 FOOT EXAM Q1 1/24/2019 MICROALBUMIN Q1 1/24/2019 LIPID PANEL Q1 4/25/2019 MEDICARE YEARLY EXAM 5/24/2019 DTaP/Tdap/Td series (2 - Td) 1/24/2028 Allergies as of 5/23/2018  Review Complete On: 4/25/2018 By: Naldo Castelan MD  
 No Known Allergies Current Immunizations  Never Reviewed No immunizations on file. Not reviewed this visit You Were Diagnosed With   
  
 Codes Comments Type 2 diabetes mellitus without complication, without long-term current use of insulin (HCC)    -  Primary ICD-10-CM: E11.9 ICD-9-CM: 250.00 Screen for colon cancer     ICD-10-CM: Z12.11 ICD-9-CM: V76.51 Hypercholesteremia     ICD-10-CM: E78.00 ICD-9-CM: 272.0 H/O prostate cancer     ICD-10-CM: Z85.46 
ICD-9-CM: V10.46 Vitals BP Pulse Temp Resp Height(growth percentile) Weight(growth percentile) 106/70 78 98.4 °F (36.9 °C) (Oral) 16 5' 11\" (1.803 m) 236 lb (107 kg) SpO2 BMI Smoking Status 94% 32.92 kg/m2 Never Smoker BMI and BSA Data Body Mass Index Body Surface Area  
 32.92 kg/m 2 2.32 m 2 Preferred Pharmacy Pharmacy Name Phone [address] [facility] [address] Katherine Cardona [address] Your Updated Medication List  
  
   
This list is accurate as of 5/23/18  8:44 AM.  Always use your most recent med list.  
  
  
  
  
 atorvastatin 40 mg tablet Commonly known as:  LIPITOR Take 1 Tab by mouth daily. glipiZIDE 5 mg tablet Commonly known as:  Arslan [place] Take 1 Tab by mouth two (2) times a day. metFORMIN 1,000 mg tablet Commonly known as:  GLUCOPHAGE  
TAKE ONE TABLET BY MOUTH TWICE DAILY WITH MEALS We Performed the Following AMB POC GLUCOSE BLOOD, BY GLUCOSE MONITORING DEVICE [43650 CPT(R)] OCCULT BLOOD, IMMUNOASSAY (FIT) [id number] CPT(R)] REFERRAL TO OPHTHALMOLOGY [REF57 Custom] Follow-up Instructions Return in about 3 months (around 8/23/2018), or if symptoms worsen or fail to improve. Referral Information Referral ID Referred By Referred To  
  
 [id number] [facility] [address] [place], [address] [facility] Visits Status Start Date End Date 1 New Request 5/23/18 5/23/19 If your referral has a status of pending review or denied, additional information will be sent to support the outcome of this decision. Patient Instructions Beebrite Activation Thank you for requesting access to Beebrite. Please follow the instructions below to securely access and download your online medical record. Beebrite allows you to send messages to your doctor, view your test results, renew your prescriptions, schedule appointments, and more. How Do I Sign Up? 1. In your internet browser, go to [email or web address] 
2. Click on the First Time User? Click Here link in the Sign In box. You will be redirect to the New Member Sign Up page. 3. Enter your Beebrite Access Code exactly as it appears below. You will not need to use this code after youve completed the sign-up process.  If you do not sign up before the expiration date, you must request a new code. BlackbookHR Access Code: Activation code not generated Current BlackbookHR Status: Patient Declined (This is the date your GAMEVILt access code will ) 4. Enter the last four digits of your Social Security Number (xxxx) and Date of Birth (mm/dd/yyyy) as indicated and click Submit. You will be taken to the next sign-up page. 5. Create a GAMEVILt ID. This will be your BlackbookHR login ID and cannot be changed, so think of one that is secure and easy to remember. 6. Create a BlackbookHR password. You can change your password at any time. 7. Enter your Password Reset Question and Answer. This can be used at a later time if you forget your password. 8. Enter your e-mail address. You will receive e-mail notification when new information is available in [address] 9. Click Sign Up. You can now view and download portions of your medical record. 10. Click the Download Summary menu link to download a portable copy of your medical information. Additional Information If you have questions, please visit the Frequently Asked Questions section of the BlackbookHR website at [email or web address] Remember, BlackbookHR is NOT to be used for urgent needs. For medical emergencies, dial 911. Please provide this summary of care documentation to your next provider. Your primary care clinician is listed as Rosie Matta. If you have any questions after today's visit, please call [phone].

## 2018-08-10 RX ORDER — METFORMIN HYDROCHLORIDE 1000 MG/1
TABLET ORAL
Qty: 180 TAB | Refills: 3 | Status: SHIPPED | OUTPATIENT
Start: 2018-08-10 | End: 2019-07-28 | Stop reason: SDUPTHER

## 2018-08-10 RX ORDER — ATORVASTATIN CALCIUM 40 MG/1
40 TABLET, FILM COATED ORAL DAILY
Qty: 90 TAB | Refills: 3 | Status: SHIPPED | OUTPATIENT
Start: 2018-08-10 | End: 2019-07-28 | Stop reason: SDUPTHER

## 2018-08-10 RX ORDER — GLIPIZIDE 5 MG/1
5 TABLET ORAL 2 TIMES DAILY
Qty: 180 TAB | Refills: 3 | Status: SHIPPED | OUTPATIENT
Start: 2018-08-10 | End: 2018-08-23 | Stop reason: SDUPTHER

## 2018-08-23 ENCOUNTER — OFFICE VISIT (OUTPATIENT)
Dept: INTERNAL MEDICINE CLINIC | Age: 68
End: 2018-08-23

## 2018-08-23 VITALS
WEIGHT: 231 LBS | DIASTOLIC BLOOD PRESSURE: 70 MMHG | HEART RATE: 82 BPM | OXYGEN SATURATION: 98 % | BODY MASS INDEX: 32.34 KG/M2 | RESPIRATION RATE: 17 BRPM | SYSTOLIC BLOOD PRESSURE: 130 MMHG | TEMPERATURE: 98 F | HEIGHT: 71 IN

## 2018-08-23 DIAGNOSIS — Z12.11 SCREENING FOR COLON CANCER: ICD-10-CM

## 2018-08-23 DIAGNOSIS — E11.9 TYPE 2 DIABETES MELLITUS WITHOUT COMPLICATION, WITHOUT LONG-TERM CURRENT USE OF INSULIN (HCC): Primary | ICD-10-CM

## 2018-08-23 DIAGNOSIS — E78.00 HYPERCHOLESTEREMIA: ICD-10-CM

## 2018-08-23 DIAGNOSIS — Z85.46 H/O PROSTATE CANCER: ICD-10-CM

## 2018-08-23 LAB
CHOLEST SERPL-MCNC: 122 MG/DL
GLUCOSE POC: 228 MG/DL
HBA1C MFR BLD HPLC: 8.6 %
HDLC SERPL-MCNC: 52 MG/DL
LDL CHOLESTEROL POC: NORMAL MG/DL
NON-HDL GOAL (POC): 70
TCHOL/HDL RATIO (POC): 2.4
TRIGL SERPL-MCNC: <45 MG/DL

## 2018-08-23 RX ORDER — GLIPIZIDE 10 MG/1
10 TABLET ORAL 2 TIMES DAILY
Qty: 60 TAB | Refills: 12 | Status: SHIPPED | OUTPATIENT
Start: 2018-08-23 | End: 2019-09-30 | Stop reason: SDUPTHER

## 2018-08-23 NOTE — PATIENT INSTRUCTIONS
[employer] Activation    Thank you for requesting access to Floxx. Please follow the instructions below to securely access and download your online medical record. Floxx allows you to send messages to your doctor, view your test results, renew your prescriptions, schedule appointments, and more. How Do I Sign Up? 1. In your internet browser, go to [email or web address]  2. Click on the First Time User? Click Here link in the Sign In box. You will be redirect to the New Member Sign Up page. 3. Enter your Floxx Access Code exactly as it appears below. You will not need to use this code after youve completed the sign-up process. If you do not sign up before the expiration date, you must request a new code. Floxx Access Code: Activation code not generated  Current Floxx Status: Patient Declined (This is the date your Floxx access code will )    4. Enter the last four digits of your Social Security Number (xxxx) and Date of Birth (mm/dd/yyyy) as indicated and click Submit. You will be taken to the next sign-up page. 5. Create a Floxx ID. This will be your Floxx login ID and cannot be changed, so think of one that is secure and easy to remember. 6. Create a Floxx password. You can change your password at any time. 7. Enter your Password Reset Question and Answer. This can be used at a later time if you forget your password. 8. Enter your e-mail address. You will receive e-mail notification when new information is available in [address] 9. Click Sign Up. You can now view and download portions of your medical record. 10. Click the Download Summary menu link to download a portable copy of your medical information. Additional Information    If you have questions, please visit the Frequently Asked Questions section of the Floxx website at [email or web address] Remember, Floxx is NOT to be used for urgent needs. For medical emergencies, dial 911.

## 2018-08-23 NOTE — PROGRESS NOTES
1. Have you been to the ER, urgent care clinic since your last visit? Hospitalized since your last visit?no  2. Have you seen or consulted any other health care providers outside of the [address] [place] [address] since your last visit? Include any pap smears or colon screening.  No  PHQ over the last two weeks 8/23/2018   PHQ Not Done -   Little interest or pleasure in doing things -   Feeling down, depressed, irritable, or hopeless Not at all   Total Score PHQ 2 -

## 2018-08-23 NOTE — MR AVS SNAPSHOT
[address] [facility] 7 [address] 
[phone] Patient: Arlette Schuster MRN: [id number] KJU:[birth year] Visit Information Date & Time Provider Department Dept. Phone Encounter #  
 8/23/2018  8:15 AM Gabino Decker MD [address] [facility] [phone] [id number] Follow-up Instructions Return in about 3 months (around 11/23/2018), or if symptoms worsen or fail to improve. Upcoming Health Maintenance Date Due  
 EYE EXAM RETINAL OR DILATED Q1 12/5/1960 GLAUCOMA SCREENING Q2Y 12/5/2015 FOBT Q 1 YEAR AGE 50-75 1/30/2018 Pneumococcal 65+ High/Highest Risk (2 of 2 - PPSV23) 3/21/2018 Influenza Age 5 to Adult 8/1/2018 HEMOGLOBIN A1C Q6M 10/25/2018 FOOT EXAM Q1 1/24/2019 MICROALBUMIN Q1 1/24/2019 LIPID PANEL Q1 4/25/2019 MEDICARE YEARLY EXAM 5/24/2019 DTaP/Tdap/Td series (2 - Td) 1/24/2028 Allergies as of 8/23/2018  Review Complete On: 8/23/2018 By: Gabino Decker MD  
 No Known Allergies Current Immunizations  Never Reviewed No immunizations on file. Not reviewed this visit You Were Diagnosed With   
  
 Codes Comments Type 2 diabetes mellitus without complication, without long-term current use of insulin (HCC)    -  Primary ICD-10-CM: E11.9 ICD-9-CM: 250.00 Hypercholesteremia     ICD-10-CM: E78.00 ICD-9-CM: 272.0 H/O prostate cancer     ICD-10-CM: Z85.46 
ICD-9-CM: V10.46 Screening for colon cancer     ICD-10-CM: Z12.11 ICD-9-CM: V76.51 Vitals BP Pulse Temp Resp Height(growth percentile) Weight(growth percentile) 130/70 (BP 1 Location: Right arm, BP Patient Position: Sitting) 82 98 °F (36.7 °C) (Oral) 17 5' 11\" (1.803 m) 231 lb (104.8 kg) SpO2 BMI Smoking Status 98% 32.22 kg/m2 Never Smoker BMI and BSA Data Body Mass Index Body Surface Area  
 32.22 kg/m 2 2.29 m 2 Preferred Pharmacy Pharmacy Name Phone [address] Katherine Cardona [address] Your Updated Medication List  
  
   
This list is accurate as of 8/23/18  8:54 AM.  Always use your most recent med list.  
  
  
  
  
 atorvastatin 40 mg tablet Commonly known as:  LIPITOR Take 1 Tab by mouth daily. glipiZIDE 10 mg tablet Commonly known as:  Selestine Armando Take 1 Tab by mouth two (2) times a day. metFORMIN 1,000 mg tablet Commonly known as:  GLUCOPHAGE  
TAKE ONE TABLET BY MOUTH TWICE DAILY WITH MEALS Prescriptions Sent to Pharmacy Refills  
 glipiZIDE (GLUCOTROL) 10 mg tablet 12 Sig: Take 1 Tab by mouth two (2) times a day. Class: Normal  
 Pharmacy: [facility] [address] RONALDO FLOWER [address] [place] [address] #: [phone] Route: Oral  
  
We Performed the Following AMB POC GLUCOSE BLOOD, BY GLUCOSE MONITORING DEVICE [67366 CPT(R)] AMB POC HEMOGLOBIN A1C [16592 CPT(R)] AMB POC LIPID PROFILE [71067 CPT(R)] OCCULT BLOOD IMMUNOASSAY,DIAGNOSTIC [98899 CPT(R)] REFERRAL TO OPHTHALMOLOGY [REF57 Custom] Follow-up Instructions Return in about 3 months (around 11/23/2018), or if symptoms worsen or fail to improve. Referral Information Referral ID Referred By Referred To  
  
 [id number] Coby Garbe [facility] 230 [facility], [address] [facility] Visits Status Start Date End Date 1 New Request 8/23/18 8/23/19 If your referral has a status of pending review or denied, additional information will be sent to support the outcome of this decision. Patient Instructions Soonr Activation Thank you for requesting access to Soonr. Please follow the instructions below to securely access and download your online medical record.  Soonr allows you to send messages to your doctor, view your test results, renew your prescriptions, schedule appointments, and more. How Do I Sign Up? 1. In your internet browser, go to [email or web address]. The Auto Vault 
2. Click on the First Time User? Click Here link in the Sign In box. You will be redirect to the New Member Sign Up page. 3. Enter your [employer] Access Code exactly as it appears below. You will not need to use this code after youve completed the sign-up process. If you do not sign up before the expiration date, you must request a new code. MyChart Access Code: Activation code not generated Current [employer] Status: Patient Declined (This is the date your MyChart access code will ) 4. Enter the last four digits of your Social Security Number (xxxx) and Date of Birth (mm/dd/yyyy) as indicated and click Submit. You will be taken to the next sign-up page. 5. Create a [employer] ID. This will be your [employer] login ID and cannot be changed, so think of one that is secure and easy to remember. 6. Create a [employer] password. You can change your password at any time. 7. Enter your Password Reset Question and Answer. This can be used at a later time if you forget your password. 8. Enter your e-mail address. You will receive e-mail notification when new information is available in [address] 9. Click Sign Up. You can now view and download portions of your medical record. 10. Click the Download Summary menu link to download a portable copy of your medical information. Additional Information If you have questions, please visit the Frequently Asked Questions section of the [employer] website at [email or web address] Remember, [employer] is NOT to be used for urgent needs. For medical emergencies, dial 911. Please provide this summary of care documentation to your next provider. Your primary care clinician is listed as Aleks Lewis. If you have any questions after today's visit, please call [phone].

## 2018-08-23 NOTE — PROGRESS NOTES
Stormy Lei is a 79 y.o. male and presents with Diabetes and Cholesterol Problem  . Subjective:  Diabetes Mellitus Review:  He has diabetes mellitus. Diabetic ROS - medication compliance: compliant all of the time, diabetic diet compliance: compliant all of the time, home glucose monitoring: is performed. Known diabetic complications: none  Cardiovascular risk factors: family history, dyslipidemia, diabetes mellitus, obesity, hypertension  Current diabetic medications include oral agents  Eye exam current (within one year): no  Weight trend: stable  Prior visit with dietician: no  Current diet: \"healthy\" diet  in general  Current exercise: walking  Current monitoring regimen: home blood tests - daily  Home blood sugar records: trend: stable  Any episodes of hypoglycemia? no  Is He on ACE inhibitor or angiotensin II receptor blocker? Yes     Dyslipidemia Review:  Patient presents for evaluation of lipids. Compliance with treatment thus far has been excellent. A repeat fasting lipid profile was done. The patient does not use medications that may worsen dyslipidemias (corticosteroids, progestins, anabolic steroids, diuretics, beta-blockers, amiodarone, cyclosporine, olanzapine). The patient exercises some    Health maintenance suggests the needs for a test for occult blood        Review of Systems  Constitutional: negative for fevers, chills, anorexia and weight loss  Eyes:   negative for visual disturbance and irritation  ENT:   negative for tinnitus,sore throat,nasal congestion,ear pains. hoarseness  Respiratory:  negative for cough, hemoptysis, dyspnea,wheezing  CV:   negative for chest pain, palpitations, lower extremity edema  GI:   negative for nausea, vomiting, diarrhea, abdominal pain,melena  Endo:               negative for polyuria,polydipsia,polyphagia,heat intolerance  Genitourinary: negative for frequency, dysuria and hematuria  Integument:  negative for rash and pruritus  Hematologic:  negative for easy bruising and gum/nose bleeding  Musculoskel: negative for myalgias, arthralgias, back pain, muscle weakness, joint pain  Neurological:  negative for headaches, dizziness, vertigo, memory problems and gait   Behavl/Psych: negative for feelings of anxiety, depression, mood changes    Past Medical History:   Diagnosis Date    Diabetes [facility])      Past Surgical History:   Procedure Laterality Date    HX ORTHOPAEDIC  2012    lumbar    HX PROSTATECTOMY  2012    HX UROLOGICAL  2012     Social History     Social History    Marital status: UNKNOWN     Spouse name: N/A    Number of children: 2    Years of education: N/A     Social History Main Topics    Smoking status: Never Smoker    Smokeless tobacco: Never Used    Alcohol use No    Drug use: No    Sexual activity: Not Asked     Other Topics Concern     Service No    Blood Transfusions No    Caffeine Concern No    Occupational Exposure No    Hobby Hazards No    Sleep Concern No    Stress Concern No    Weight Concern No    Special Diet No    Back Care No    Exercise Yes    Bike Helmet No    Seat Belt No    Self-Exams No     Social History Narrative     History reviewed. No pertinent family history. Current Outpatient Prescriptions   Medication Sig Dispense Refill    atorvastatin (LIPITOR) 40 mg tablet Take 1 Tab by mouth daily. 90 Tab 3    glipiZIDE (GLUCOTROL) 5 mg tablet Take 1 Tab by mouth two (2) times a day.  180 Tab 3    metFORMIN (GLUCOPHAGE) 1,000 mg tablet TAKE ONE TABLET BY MOUTH TWICE DAILY WITH MEALS 180 Tab 3     No Known Allergies    Objective:  Visit Vitals    /70 (BP 1 Location: Right arm, BP Patient Position: Sitting)    Pulse 82    Temp 98 °F (36.7 °C) (Oral)    Resp 17    Ht 5' 11\" (1.803 m)    Wt 231 lb (104.8 kg)    SpO2 98%    BMI 32.22 kg/m2     Physical Exam:   General appearance - alert, well appearing, and in no distress  Mental status - alert, oriented to person, place, and time  EYE-DAVIAN, EOMI, corneas normal, no foreign bodies  ENT-ENT exam normal, no neck nodes or sinus tenderness  Nose - normal and patent, no erythema, discharge or polyps  Mouth - mucous membranes moist, pharynx normal without lesions  Neck - supple, no significant adenopathy   Chest - clear to auscultation, no wheezes, rales or rhonchi, symmetric air entry   Heart - normal rate, regular rhythm, normal S1, S2, no murmurs, rubs, clicks or gallops   Abdomen - soft, nontender, nondistended, no masses or organomegaly  Lymph- no adenopathy palpable  Ext-peripheral pulses normal, no pedal edema, no clubbing or cyanosis  Skin-Warm and dry. no hyperpigmentation, vitiligo, or suspicious lesions  Neuro -alert, oriented, normal speech, no focal findings or movement disorder noted  Neck-normal C-spine, no tenderness, full ROM without pain  Feet-no nail deformities or callus formation with good pulses noted      Results for orders placed or performed in visit on 05/23/18   AMB POC GLUCOSE BLOOD, BY GLUCOSE MONITORING DEVICE   Result Value Ref Range    Glucose  mg/dL       Assessment/Plan:    ICD-10-CM ICD-9-CM    1. Type 2 diabetes mellitus without complication, without long-term current use of insulin (HCC) E11.9 250.00    2. Hypercholesteremia E78.00 272.0    3. H/O prostate cancer Z85.46 V10.46      No orders of the defined types were placed in this encounter. lose weight, increase physical activity, follow low fat diet, follow low salt diet  Patient Instructions   Cobalt Technologies Activation    Thank you for requesting access to Cobalt Technologies. Please follow the instructions below to securely access and download your online medical record. Cobalt Technologies allows you to send messages to your doctor, view your test results, renew your prescriptions, schedule appointments, and more. How Do I Sign Up? 1. In your internet browser, go to [email or web address]  2. Click on the First Time User? Click Here link in the Sign In box.  You will be redirect to the New Member Sign Up page. 3. Enter your Bitbart Access Code exactly as it appears below. You will not need to use this code after youve completed the sign-up process. If you do not sign up before the expiration date, you must request a new code. MyChart Access Code: Activation code not generated  Current Maytech Status: Patient Declined (This is the date your MyChart access code will )    4. Enter the last four digits of your Social Security Number (xxxx) and Date of Birth (mm/dd/yyyy) as indicated and click Submit. You will be taken to the next sign-up page. 5. Create a Bitbart ID. This will be your Maytech login ID and cannot be changed, so think of one that is secure and easy to remember. 6. Create a Bitbart password. You can change your password at any time. 7. Enter your Password Reset Question and Answer. This can be used at a later time if you forget your password. 8. Enter your e-mail address. You will receive e-mail notification when new information is available in [address] 9. Click Sign Up. You can now view and download portions of your medical record. 10. Click the Download Summary menu link to download a portable copy of your medical information. Additional Information    If you have questions, please visit the Frequently Asked Questions section of the Submittablehart website at [email or web address] Remember, Maytech is NOT to be used for urgent needs. For medical emergencies, dial 911. Follow-up Disposition:  Return in about 3 months (around 2018), or if symptoms worsen or fail to improve. I have reviewed with the patient details of the assessment and plan and all questions were answered. Relevent patient education was performed    An After Visit Summary was printed and given to the patient.

## 2018-11-28 ENCOUNTER — OFFICE VISIT (OUTPATIENT)
Dept: INTERNAL MEDICINE CLINIC | Age: 68
End: 2018-11-28

## 2018-11-28 VITALS
HEIGHT: 71 IN | TEMPERATURE: 98.1 F | OXYGEN SATURATION: 100 % | RESPIRATION RATE: 20 BRPM | DIASTOLIC BLOOD PRESSURE: 70 MMHG | BODY MASS INDEX: 32.06 KG/M2 | SYSTOLIC BLOOD PRESSURE: 120 MMHG | HEART RATE: 101 BPM | WEIGHT: 229 LBS

## 2018-11-28 DIAGNOSIS — Z12.11 SCREENING FOR COLON CANCER: ICD-10-CM

## 2018-11-28 DIAGNOSIS — E11.9 TYPE 2 DIABETES MELLITUS WITHOUT COMPLICATION, WITHOUT LONG-TERM CURRENT USE OF INSULIN (HCC): Primary | ICD-10-CM

## 2018-11-28 DIAGNOSIS — E78.00 HYPERCHOLESTEREMIA: ICD-10-CM

## 2018-11-28 LAB
CHOLEST SERPL-MCNC: 133 MG/DL
GLUCOSE POC: 170 MG/DL
HBA1C MFR BLD HPLC: 8 %
HDLC SERPL-MCNC: 61 MG/DL
LDL CHOLESTEROL POC: NORMAL MG/DL
NON-HDL GOAL (POC): 72
TCHOL/HDL RATIO (POC): 2.2
TRIGL SERPL-MCNC: 45 MG/DL

## 2018-11-28 NOTE — PROGRESS NOTES
Nicci Aviles is a 79 y.o. male and presents with Diabetes and Cholesterol Problem  . Subjective:  Diabetes Mellitus Review:  He has diabetes mellitus. Diabetic ROS - medication compliance: compliant all of the time, diabetic diet compliance: compliant all of the time, home glucose monitoring: is performed. Known diabetic complications: none  Cardiovascular risk factors: family history, dyslipidemia, diabetes mellitus, obesity, hypertension  Current diabetic medications include oral agents  Eye exam current (within one year): no  Weight trend: stable  Prior visit with dietician: no  Current diet: \"healthy\" diet  in general  Current exercise: walking  Current monitoring regimen: home blood tests - daily  Home blood sugar records: trend: stable  Any episodes of hypoglycemia? no  Is He on ACE inhibitor or angiotensin II receptor blocker? Yes     Dyslipidemia Review:  Patient presents for evaluation of lipids. Compliance with treatment thus far has been excellent. A repeat fasting lipid profile was done. The patient does not use medications that may worsen dyslipidemias (corticosteroids, progestins, anabolic steroids, diuretics, beta-blockers, amiodarone, cyclosporine, olanzapine). The patient exercises some    Health maintenance suggests the needs for a test for occult blood          Review of Systems  Constitutional: negative for fevers, chills, anorexia and weight loss  Eyes:   negative for visual disturbance and irritation  ENT:   negative for tinnitus,sore throat,nasal congestion,ear pains. hoarseness  Respiratory:  negative for cough, hemoptysis, dyspnea,wheezing  CV:   negative for chest pain, palpitations, lower extremity edema  GI:   negative for nausea, vomiting, diarrhea, abdominal pain,melena  Endo:               negative for polyuria,polydipsia,polyphagia,heat intolerance  Genitourinary: negative for frequency, dysuria and hematuria  Integument:  negative for rash and pruritus  Hematologic: negative for easy bruising and gum/nose bleeding  Musculoskel: negative for myalgias, arthralgias, back pain, muscle weakness, joint pain  Neurological:  negative for headaches, dizziness, vertigo, memory problems and gait   Behavl/Psych: negative for feelings of anxiety, depression, mood changes    Past Medical History:   Diagnosis Date    Diabetes [facility])      Past Surgical History:   Procedure Laterality Date    HX ORTHOPAEDIC  2012    lumbar    HX PROSTATECTOMY  2012    HX UROLOGICAL  2012     Social History     Socioeconomic History    Marital status: UNKNOWN     Spouse name: Not on file    Number of children: 2    Years of education: Not on file    Highest education level: Not on file   Tobacco Use    Smoking status: Never Smoker    Smokeless tobacco: Never Used   Substance and Sexual Activity    Alcohol use: No    Drug use: No   Other Topics Concern     Service No    Blood Transfusions No    Caffeine Concern No    Occupational Exposure No    Hobby Hazards No    Sleep Concern No    Stress Concern No    Weight Concern No    Special Diet No    Back Care No    Exercise Yes    Bike Helmet No    Seat Belt No    Self-Exams No     History reviewed. No pertinent family history. Current Outpatient Medications   Medication Sig Dispense Refill    glipiZIDE (GLUCOTROL) 10 mg tablet Take 1 Tab by mouth two (2) times a day. 60 Tab 12    atorvastatin (LIPITOR) 40 mg tablet Take 1 Tab by mouth daily.  90 Tab 3    metFORMIN (GLUCOPHAGE) 1,000 mg tablet TAKE ONE TABLET BY MOUTH TWICE DAILY WITH MEALS 180 Tab 3     No Known Allergies    Objective:  Visit Vitals  /70 (BP 1 Location: Right arm, BP Patient Position: Sitting)   Pulse (!) 101   Temp 98.1 °F (36.7 °C) (Oral)   Resp 20   Ht 5' 11\" (1.803 m)   Wt 229 lb (103.9 kg)   SpO2 100%   BMI 31.94 kg/m²     Physical Exam:   General appearance - alert, well appearing, and in no distress  Mental status - alert, oriented to person, place, and time  EYE-DAVIAN, EOMI, corneas normal, no foreign bodies  ENT-ENT exam normal, no neck nodes or sinus tenderness  Nose - normal and patent, no erythema, discharge or polyps  Mouth - mucous membranes moist, pharynx normal without lesions  Neck - supple, no significant adenopathy   Chest - clear to auscultation, no wheezes, rales or rhonchi, symmetric air entry   Heart - normal rate, regular rhythm, normal S1, S2, no murmurs, rubs, clicks or gallops   Abdomen - soft, nontender, nondistended, no masses or organomegaly  Lymph- no adenopathy palpable  Ext-peripheral pulses normal, no pedal edema, no clubbing or cyanosis  Skin-Warm and dry. no hyperpigmentation, vitiligo, or suspicious lesions  Neuro -alert, oriented, normal speech, no focal findings or movement disorder noted  Neck-normal C-spine, no tenderness, full ROM without pain  Feet-no nail deformities or callus formation with good pulses noted      Results for orders placed or performed in visit on 11/28/18   AMB POC GLUCOSE BLOOD, BY GLUCOSE MONITORING DEVICE   Result Value Ref Range    Glucose  mg/dL   AMB POC HEMOGLOBIN A1C   Result Value Ref Range    Hemoglobin A1c (POC) 8.0 %   AMB POC LIPID PROFILE   Result Value Ref Range    Cholesterol (POC) 133     Triglycerides (POC) 45     HDL Cholesterol (POC) 61     LDL Cholesterol (POC)  MG/DL    Non-HDL Goal (POC) 72     TChol/HDL Ratio (POC) 2.2        Assessment/Plan:    ICD-10-CM ICD-9-CM    1. Type 2 diabetes mellitus without complication, without long-term current use of insulin (HCC) E11.9 250.00 AMB POC GLUCOSE BLOOD, BY GLUCOSE MONITORING DEVICE      AMB POC HEMOGLOBIN A1C   2.  Hypercholesteremia E78.00 272.0 AMB POC LIPID PROFILE   3. Screening for colon cancer Z12.11 V76.51 OCCULT BLOOD IMMUNOASSAY,DIAGNOSTIC     Orders Placed This Encounter    OCCULT BLOOD IMMUNOASSAY,DIAGNOSTIC    AMB POC GLUCOSE BLOOD, BY GLUCOSE MONITORING DEVICE    AMB POC HEMOGLOBIN A1C    AMB POC LIPID PROFILE lose weight, increase physical activity, follow low fat diet, follow low salt diet  Patient Instructions   MyChart Activation    Thank you for requesting access to [facility] [employer] [email or web address]. Please follow the instructions below to securely access and download your online medical record. [facility] allows you to send messages to your doctor, view your test results, renew your prescriptions, schedule appointments, and more. How Do I Sign Up? 1. In your internet browser, go to [email or web address]  2. Click on the First Time User? Click Here link in the Sign In box. You will be redirect to the New Member Sign Up page. 3. Enter your E-[facility]it Access Code exactly as it appears below. You will not need to use this code after youve completed the sign-up process. If you do not sign up before the expiration date, you must request a new code. E-Box [email or web address] Access Code: [email or web address]  Expires: 2019  9:25 AM (This is the date your E-Box [facility]it access code will )    4. Enter the last four digits of your Social Security Number (xxxx) and Date of Birth (mm/dd/yyyy) as indicated and click Submit. You will be taken to the next sign-up page. 5. Create a E-Box [facility] Blog[facility]it ID. This will be your E-[facility] login ID and cannot be changed, so think of one that is secure and easy to remember. 6. Create a E[facility] password. You can change your password at any time. 7. Enter your Password Reset Question and Answer. This can be used at a later time if you forget your password. 8. Enter your e-mail address. You will receive e-mail notification when new information is available in [address] 9. Click Sign Up. You can now view and download portions of your medical record. 10. Click the Download Summary menu link to download a portable copy of your medical information. Additional Information    If you have questions, please visit the Frequently Asked Questions section of the [email or web address] website at [email or web address] Remember, [email or web address] is NOT to be used for urgent needs. For medical emergencies, dial 911. Follow-up Disposition:  Return in about 3 months (around 2/28/2019), or if symptoms worsen or fail to improve. I have reviewed with the patient details of the assessment and plan and all questions were answered. Relevent patient education was performed    An After Visit Summary was printed and given to the patient.

## 2018-11-28 NOTE — PROGRESS NOTES
1. Have you been to the ER, urgent care clinic since your last visit? Hospitalized since your last visit?no    2. Have you seen or consulted any other health care providers outside of the [address] [place] [address] since your last visit? Include any pap smears or colon screening.  No    PHQ over the last two weeks 11/28/2018   PHQ Not Done -   Little interest or pleasure in doing things Not at all   Feeling down, depressed, irritable, or hopeless Not at all   Total Score PHQ 2 0

## 2018-11-28 NOTE — PATIENT INSTRUCTIONS
[facility] Activation    Thank you for requesting access to [facility]. Please follow the instructions below to securely access and download your online medical record. [facility] allows you to send messages to your doctor, view your test results, renew your prescriptions, schedule appointments, and more. How Do I Sign Up? 1. In your internet browser, go to [email or web address]  2. Click on the First Time User? Click Here link in the Sign In box. You will be redirect to the New Member Sign Up page. 3. Enter your Open Garden Access Code exactly as it appears below. You will not need to use this code after youve completed the sign-up process. If you do not sign up before the expiration date, you must request a new code. Open Garden Access Code: [email or web address]  Expires: 2019  9:25 AM (This is the date your Open Garden access code will )    4. Enter the last four digits of your Social Security Number (xxxx) and Date of Birth (mm/dd/yyyy) as indicated and click Submit. You will be taken to the next sign-up page. 5. Create a Open Garden ID. This will be your [facility] Garden login ID and cannot be changed, so think of one that is secure and easy to remember. 6. Create a [facility] Garden password. You can change your password at any time. 7. Enter your Password Reset Question and Answer. This can be used at a later time if you forget your password. 8. Enter your e-mail address. You will receive e-mail notification when new information is available in [address] 9. Click Sign Up. You can now view and download portions of your medical record. 10. Click the Download Summary menu link to download a portable copy of your medical information. Additional Information    If you have questions, please visit the Frequently Asked Questions section of the Open Garden website at [email or web address] Remember, Open Garden is NOT to be used for urgent needs. For medical emergencies, dial 911.

## 2018-11-30 LAB — HEMOCCULT STL QL IA: POSITIVE

## 2019-02-20 ENCOUNTER — OFFICE VISIT (OUTPATIENT)
Dept: INTERNAL MEDICINE CLINIC | Age: 69
End: 2019-02-20

## 2019-02-20 VITALS
DIASTOLIC BLOOD PRESSURE: 80 MMHG | OXYGEN SATURATION: 98 % | BODY MASS INDEX: 31.84 KG/M2 | TEMPERATURE: 98 F | HEART RATE: 82 BPM | WEIGHT: 227.4 LBS | SYSTOLIC BLOOD PRESSURE: 136 MMHG | HEIGHT: 71 IN | RESPIRATION RATE: 20 BRPM

## 2019-02-20 DIAGNOSIS — E78.00 HYPERCHOLESTEREMIA: Primary | ICD-10-CM

## 2019-02-20 DIAGNOSIS — J01.11 ACUTE RECURRENT FRONTAL SINUSITIS: ICD-10-CM

## 2019-02-20 DIAGNOSIS — R05.3 CHRONIC COUGH: ICD-10-CM

## 2019-02-20 DIAGNOSIS — J41.0 SIMPLE CHRONIC BRONCHITIS (HCC): ICD-10-CM

## 2019-02-20 DIAGNOSIS — E11.9 TYPE 2 DIABETES MELLITUS WITHOUT COMPLICATION, WITHOUT LONG-TERM CURRENT USE OF INSULIN (HCC): ICD-10-CM

## 2019-02-20 LAB
CHOLEST SERPL-MCNC: 149 MG/DL
GLUCOSE POC: 135 MG/DL
HBA1C MFR BLD HPLC: 7 %
HDLC SERPL-MCNC: 66 MG/DL
LDL CHOLESTEROL POC: NORMAL MG/DL
TCHOL/HDL RATIO (POC): 2.2
TRIGL SERPL-MCNC: <45 MG/DL
VLDLC SERPL CALC-MCNC: 83 MG/DL

## 2019-02-20 RX ORDER — FLUTICASONE PROPIONATE AND SALMETEROL 100; 50 UG/1; UG/1
1 POWDER RESPIRATORY (INHALATION) 2 TIMES DAILY
Qty: 1 INHALER | Refills: 12
Start: 2019-02-20 | End: 2020-01-23 | Stop reason: ALTCHOICE

## 2019-02-20 RX ORDER — LEVOFLOXACIN 500 MG/1
500 TABLET, FILM COATED ORAL DAILY
Qty: 10 TAB | Refills: 0 | Status: SHIPPED | OUTPATIENT
Start: 2019-02-20 | End: 2019-06-20 | Stop reason: ALTCHOICE

## 2019-02-20 RX ORDER — FLUTICASONE PROPIONATE 50 MCG
2 SPRAY, SUSPENSION (ML) NASAL DAILY
Qty: 1 BOTTLE | Refills: 12 | Status: SHIPPED | OUTPATIENT
Start: 2019-02-20 | End: 2019-06-20 | Stop reason: ALTCHOICE

## 2019-02-20 RX ORDER — ALBUTEROL SULFATE 90 UG/1
2 AEROSOL, METERED RESPIRATORY (INHALATION)
Qty: 1 INHALER | Refills: 12 | Status: SHIPPED | OUTPATIENT
Start: 2019-02-20 | End: 2020-01-23 | Stop reason: ALTCHOICE

## 2019-02-20 NOTE — PROGRESS NOTES
.destiny Arthur is a 76 y.o. male and presents with Cholesterol Problem and Diabetes  . Subjective:  COPD REVIEW:  The patient is being seen for follow up of chronic coughthe patient has been unstable no,wheezing has been reported  Oxygen: He currently is not on home oxygen therapy. Symptoms: no chronic dyspnea is reported   Patient uses 2 pillows at night. Patient does not continue to smoke cigarettes. Diabetes Mellitus Review:  He has diabetes mellitus. Diabetic ROS - medication compliance: compliant all of the time, diabetic diet compliance: compliant all of the time, home glucose monitoring: is performed. Known diabetic complications: none  Cardiovascular risk factors: family history, dyslipidemia, diabetes mellitus, obesity, hypertension  Current diabetic medications include oral agents  Eye exam current (within one year): no  Weight trend: stable  Prior visit with dietician: no  Current diet: \"healthy\" diet  in general  Current exercise: walking  Current monitoring regimen: home blood tests - daily  Home blood sugar records: trend: stable  Any episodes of hypoglycemia? no  Is He on ACE inhibitor or angiotensin II receptor blocker? Yes     Dyslipidemia Review:  Patient presents for evaluation of lipids. Compliance with treatment thus far has been excellent. A repeat fasting lipid profile was done. The patient does not use medications that may worsen dyslipidemias (corticosteroids, progestins, anabolic steroids, diuretics, beta-blockers, amiodarone, cyclosporine, olanzapine). The patient exercises some    He has a post nasal drip      Review of Systems  Constitutional: negative for fevers, chills, anorexia and weight loss  Eyes:   negative for visual disturbance and irritation  ENT:   negative for tinnitus,sore throat,nasal congestion,ear pains. hoarseness  Respiratory:  negative for cough, hemoptysis, dyspnea,wheezing  CV:   negative for chest pain, palpitations, lower extremity edema  GI: negative for nausea, vomiting, diarrhea, abdominal pain,melena  Endo:               negative for polyuria,polydipsia,polyphagia,heat intolerance  Genitourinary: negative for frequency, dysuria and hematuria  Integument:  negative for rash and pruritus  Hematologic:  negative for easy bruising and gum/nose bleeding  Musculoskel: negative for myalgias, arthralgias, back pain, muscle weakness, joint pain  Neurological:  negative for headaches, dizziness, vertigo, memory problems and gait   Behavl/Psych: negative for feelings of anxiety, depression, mood changes    Past Medical History:   Diagnosis Date    Diabetes [facility] [address]      Past Surgical History:   Procedure Laterality Date    HX ORTHOPAEDIC  2012    lumbar    HX PROSTATECTOMY  2012    HX UROLOGICAL  2012     Social History     Socioeconomic History    Marital status: UNKNOWN     Spouse name: Not on file    Number of children: 2    Years of education: Not on file    Highest education level: Not on file   Tobacco Use    Smoking status: Never Smoker    Smokeless tobacco: Never Used   Substance and Sexual Activity    Alcohol use: No    Drug use: No   Other Topics Concern     Service No    Blood Transfusions No    Caffeine Concern No    Occupational Exposure No    Hobby Hazards No    Sleep Concern No    Stress Concern No    Weight Concern No    Special Diet No    Back Care No    Exercise Yes    Bike Helmet No    Seat Belt No    Self-Exams No     History reviewed. No pertinent family history. Current Outpatient Medications   Medication Sig Dispense Refill    levoFLOXacin (LEVAQUIN) 500 mg tablet Take 1 Tab by mouth daily. 10 Tab 0    fluticasone (FLONASE) 50 mcg/actuation nasal spray 2 Sprays by Both Nostrils route daily. 1 Bottle 12    albuterol (PROVENTIL HFA, VENTOLIN HFA, PROAIR HFA) 90 mcg/actuation inhaler Take 2 Puffs by inhalation every four (4) hours as needed for Wheezing.  1 Inhaler 12    fluticasone-salmeterol (ADVAIR) 100-50 mcg/dose diskus inhaler Take 1 Puff by inhalation two (2) times a day. 1 Inhaler 12    glipiZIDE (GLUCOTROL) 10 mg tablet Take 1 Tab by mouth two (2) times a day. 60 Tab 12    atorvastatin (LIPITOR) 40 mg tablet Take 1 Tab by mouth daily. 90 Tab 3    metFORMIN (GLUCOPHAGE) 1,000 mg tablet TAKE ONE TABLET BY MOUTH TWICE DAILY WITH MEALS 180 Tab 3     No Known Allergies    Objective:  Visit Vitals  /80 (BP 1 Location: Right arm, BP Patient Position: Sitting)   Pulse 82   Temp 98 °F (36.7 °C)   Resp 20   Ht 5' 11\" (1.803 m)   Wt 227 lb 6.4 oz (103.1 kg)   SpO2 98%   BMI 31.72 kg/m²     Physical Exam:   General appearance - alert, well appearing, and in no distress  Mental status - alert, oriented to person, place, and time  EYE-DAVIAN, EOMI, corneas normal, no foreign bodies  ENT-ENT exam normal, no neck nodes or sinus tenderness  Nose - normal and patent, no erythema, discharge or polyps  Mouth - mucous membranes moist, pharynx normal without lesions  Neck - supple, no significant adenopathy   Chest - clear to auscultation, no wheezes, rales or rhonchi, symmetric air entry   Heart - normal rate, regular rhythm, normal S1, S2, no murmurs, rubs, clicks or gallops   Abdomen - soft, nontender, nondistended, no masses or organomegaly  Lymph- no adenopathy palpable  Ext-peripheral pulses normal, no pedal edema, no clubbing or cyanosis  Skin-Warm and dry.  no hyperpigmentation, vitiligo, or suspicious lesions  Neuro -alert, oriented, normal speech, no focal findings or movement disorder noted  Neck-normal C-spine, no tenderness, full ROM without pain  Feet-no nail deformities or callus formation with good pulses noted      Results for orders placed or performed in visit on 02/20/19   AMB POC LIPID PROFILE   Result Value Ref Range    Cholesterol (POC) 149     Triglycerides (POC) <45     HDL Cholesterol (POC) 66     VLDL (POC) 83 MG/DL    LDL Cholesterol (POC) n/a MG/DL    TChol/HDL Ratio (POC) 2.2    AMB POC GLUCOSE BLOOD, BY GLUCOSE MONITORING DEVICE   Result Value Ref Range    Glucose  mg/dL   AMB POC HEMOGLOBIN A1C   Result Value Ref Range    Hemoglobin A1c (POC) 7.0 %       Assessment/Plan:    ICD-10-CM ICD-9-CM    1. Hypercholesteremia E78.00 272.0 AMB POC LIPID PROFILE   2. Type 2 diabetes mellitus without complication, without long-term current use of insulin (HCC) E11.9 250.00 AMB POC GLUCOSE BLOOD, BY GLUCOSE MONITORING DEVICE      AMB POC HEMOGLOBIN A1C   3. Acute recurrent frontal sinusitis J01.11 461.1    4. Chronic cough R05 786.2 XR CHEST PA LAT      AMB POC SPIROMETRY      MYCOPLASMA AB, IGG/IGM   5. Simple chronic bronchitis (HCC) J41.0 491.0      Orders Placed This Encounter    AMB POC SPIROMETRY    MYCOPLASMA AB, IGG/IGM    XR CHEST PA LAT     Standing Status:   Future     Standing Expiration Date:   2019     Order Specific Question:   Reason for Exam     Answer:   dyspnea,cough     Order Specific Question:   Is Patient Allergic to Contrast Dye? Answer:   No    AMB POC LIPID PROFILE    AMB POC GLUCOSE BLOOD, BY GLUCOSE MONITORING DEVICE    AMB POC HEMOGLOBIN A1C    levoFLOXacin (LEVAQUIN) 500 mg tablet     Sig: Take 1 Tab by mouth daily. Dispense:  10 Tab     Refill:  0    fluticasone (FLONASE) 50 mcg/actuation nasal spray     Si Sprays by Both Nostrils route daily. Dispense:  1 Bottle     Refill:  12    albuterol (PROVENTIL HFA, VENTOLIN HFA, PROAIR HFA) 90 mcg/actuation inhaler     Sig: Take 2 Puffs by inhalation every four (4) hours as needed for Wheezing. Dispense:  1 Inhaler     Refill:  12    fluticasone-salmeterol (ADVAIR) 100-50 mcg/dose diskus inhaler     Sig: Take 1 Puff by inhalation two (2) times a day. Dispense:  1 Inhaler     Refill:  12     lose weight, increase physical activity, follow low fat diet, follow low salt diet  There are no Patient Instructions on file for this visit.    Follow-up Disposition:  Return in about 3 months (around 5/20/2019). I have reviewed with the patient details of the assessment and plan and all questions were answered. Relevent patient education was performed    An After Visit Summary was printed and given to the patient.

## 2019-02-20 NOTE — PATIENT INSTRUCTIONS
[facility]us Activation    Thank you for requesting access to [facility]. Please follow the instructions below to securely access and download your online medical record. Novus allows you to send messages to your doctor, view your test results, renew your prescriptions, schedule appointments, and more. How Do I Sign Up? 1. In your internet browser, go to [email or web address].[email or web address]  2. Click on the First Time User? Click Here link in the Sign In box. You will be redirect to the New Member Sign Up page. 3. Enter your Novus Access Code exactly as it appears below. You will not need to use this code after youve completed the sign-up process. If you do not sign up before the expiration date, you must request a new code. Novus Access Code: Anthony Box  Expires: 2019  8:30 AM (This is the date your Novus access code will )    4. Enter the last four digits of your Social Security Number (xxxx) and Date of Birth (mm/dd/yyyy) as indicated and click Submit. You will be taken to the next sign-up page. 5. Create a Novus ID. This will be your Novus login ID and cannot be changed, so think of one that is secure and easy to remember. 6. Create a Novus password. You can change your password at any time. 7. Enter your Password Reset Question and Answer. This can be used at a later time if you forget your password. 8. Enter your e-mail address. You will receive e-mail notification when new information is available in [address] 9. Click Sign Up. You can now view and download portions of your medical record. 10. Click the Download Summary menu link to download a portable copy of your medical information. Additional Information    If you have questions, please visit the Frequently Asked Questions section of the Novus website at [email or web address] Remember, Novus is NOT to be used for urgent needs. For medical emergencies, dial 911.

## 2019-02-20 NOTE — PROGRESS NOTES
Chief Complaint   Patient presents with    Cholesterol Problem    Diabetes      Debora Stubbs is a 76 y.o. male and presents with Cholesterol Problem and Diabetes  . Subjective:  Upper respiratory infection Review:  Debora Stubbs is a 76 y.o. male who complains of nasal congestion, , productive cough,   for the past few months, gradually worsening since that time. He denies a history of shortness of breath. Evaluation to date: none. Treatment to date: OTC products. Relevant PMH: No pertinent additional PMH. He has a history of smoking          Diabetes Mellitus Review:  He has diabetes mellitus. Diabetic ROS - medication compliance: compliant all of the time, diabetic diet compliance: compliant all of the time, home glucose monitoring: is performed. Known diabetic complications: none  Cardiovascular risk factors: family history, dyslipidemia, diabetes mellitus, obesity, hypertension  Current diabetic medications include oral agents  Eye exam current (within one year): no  Weight trend: stable  Prior visit with dietician: no  Current diet: \"healthy\" diet  in general  Current exercise: walking  Current monitoring regimen: home blood tests - daily  Home blood sugar records: trend: stable  Any episodes of hypoglycemia? no  Is He on ACE inhibitor or angiotensin II receptor blocker? Yes     Dyslipidemia Review:  Patient presents for evaluation of lipids. Compliance with treatment thus far has been excellent. A repeat fasting lipid profile was done. The patient does not use medications that may worsen dyslipidemias (corticosteroids, progestins, anabolic steroids, diuretics, beta-blockers, amiodarone, cyclosporine, olanzapine).  The patient exercises some    Health maintenance suggests the needs for a test for occult blood          Review of Systems  Constitutional: negative for fevers, chills, anorexia and weight loss  Eyes:   negative for visual disturbance and irritation  ENT:   negative for tinnitus,sore throat,,ear pains. hoarseness  Respiratory:  negative for , hemoptysis, dyspnea,wheezing  CV:   negative for chest pain, palpitations, lower extremity edema  GI:   negative for nausea, vomiting, diarrhea, abdominal pain,melena  Endo:               negative for polyuria,polydipsia,polyphagia,heat intolerance  Genitourinary: negative for frequency, dysuria and hematuria  Integument:  negative for rash and pruritus  Hematologic:  negative for easy bruising and gum/nose bleeding  Musculoskel: negative for myalgias, arthralgias, back pain, muscle weakness, joint pain  Neurological:  negative for headaches, dizziness, vertigo, memory problems and gait   Behavl/Psych: negative for feelings of anxiety, depression, mood changes    Past Medical History:   Diagnosis Date    Diabetes ([facility] [address]      Past Surgical History:   Procedure Laterality Date    HX ORTHOPAEDIC  2012    lumbar    HX PROSTATECTOMY  2012    HX UROLOGICAL  2012     Social History     Socioeconomic History    Marital status: UNKNOWN     Spouse name: Not on file    Number of children: 2    Years of education: Not on file    Highest education level: Not on file   Tobacco Use    Smoking status: Never Smoker    Smokeless tobacco: Never Used   Substance and Sexual Activity    Alcohol use: No    Drug use: No   Other Topics Concern     Service No    Blood Transfusions No    Caffeine Concern No    Occupational Exposure No    Hobby Hazards No    Sleep Concern No    Stress Concern No    Weight Concern No    Special Diet No    Back Care No    Exercise Yes    Bike Helmet No    Seat Belt No    Self-Exams No     History reviewed. No pertinent family history. Current Outpatient Medications   Medication Sig Dispense Refill    levoFLOXacin (LEVAQUIN) 500 mg tablet Take 1 Tab by mouth daily. 10 Tab 0    fluticasone (FLONASE) 50 mcg/actuation nasal spray 2 Sprays by Both Nostrils route daily.  1 Bottle 12    albuterol (PROVENTIL HFA, VENTOLIN HFA, PROAIR HFA) 90 mcg/actuation inhaler Take 2 Puffs by inhalation every four (4) hours as needed for Wheezing. 1 Inhaler 12    fluticasone-salmeterol (ADVAIR) 100-50 mcg/dose diskus inhaler Take 1 Puff by inhalation two (2) times a day. 1 Inhaler 12    glipiZIDE (GLUCOTROL) 10 mg tablet Take 1 Tab by mouth two (2) times a day. 60 Tab 12    atorvastatin (LIPITOR) 40 mg tablet Take 1 Tab by mouth daily. 90 Tab 3    metFORMIN (GLUCOPHAGE) 1,000 mg tablet TAKE ONE TABLET BY MOUTH TWICE DAILY WITH MEALS 180 Tab 3     No Known Allergies    Objective:  Visit Vitals  /80 (BP 1 Location: Right arm, BP Patient Position: Sitting)   Pulse 82   Temp 98 °F (36.7 °C)   Resp 20   Ht 5' 11\" (1.803 m)   Wt 227 lb 6.4 oz (103.1 kg)   SpO2 98%   BMI 31.72 kg/m²     Physical Exam:   General appearance - alert, well appearing, and in no distress  Mental status - alert, oriented to person, place, and time  EYE-DAVIAN, EOMI, corneas normal, no foreign bodies  ENT-ENT exam normal, no neck nodes or sinus tenderness  Nose - normal and patent, no erythema, discharge or polyps  Mouth - mucous membranes moist, pharynx normal without lesions  Neck - supple, no significant adenopathy   Chest - clear to auscultation, no wheezes, rales or rhonchi, symmetric air entry   Heart - normal rate, regular rhythm, normal S1, S2, no murmurs, rubs, clicks or gallops   Abdomen - soft, nontender, nondistended, no masses or organomegaly  Lymph- no adenopathy palpable  Ext-peripheral pulses normal, no pedal edema, no clubbing or cyanosis  Skin-Warm and dry.  no hyperpigmentation, vitiligo, or suspicious lesions  Neuro -alert, oriented, normal speech, no focal findings or movement disorder noted  Neck-normal C-spine, no tenderness, full ROM without pain  Feet-no nail deformities or callus formation with good pulses noted      Results for orders placed or performed in visit on 02/20/19   AMB POC LIPID PROFILE   Result Value Ref Range    Cholesterol (POC) 149 Triglycerides (POC) <45     HDL Cholesterol (POC) 66     VLDL (POC) 83 MG/DL    LDL Cholesterol (POC) n/a MG/DL    TChol/HDL Ratio (POC) 2.2    AMB POC GLUCOSE BLOOD, BY GLUCOSE MONITORING DEVICE   Result Value Ref Range    Glucose  mg/dL   AMB POC HEMOGLOBIN A1C   Result Value Ref Range    Hemoglobin A1c (POC) 7.0 %       Assessment/Plan:    ICD-10-CM ICD-9-CM    1. Hypercholesteremia E78.00 272.0 AMB POC LIPID PROFILE   2. Type 2 diabetes mellitus without complication, without long-term current use of insulin (HCC) E11.9 250.00 AMB POC GLUCOSE BLOOD, BY GLUCOSE MONITORING DEVICE      AMB POC HEMOGLOBIN A1C   3. Acute recurrent frontal sinusitis J01.11 461.1    4. Chronic cough R05 786.2 XR CHEST PA LAT      AMB POC SPIROMETRY      MYCOPLASMA AB, IGG/IGM   5. Simple chronic bronchitis (HCC) J41.0 491.0      Orders Placed This Encounter    AMB POC SPIROMETRY    MYCOPLASMA AB, IGG/IGM    XR CHEST PA LAT     Standing Status:   Future     Standing Expiration Date:   2019     Order Specific Question:   Reason for Exam     Answer:   dyspnea,cough     Order Specific Question:   Is Patient Allergic to Contrast Dye? Answer:   No    AMB POC LIPID PROFILE    AMB POC GLUCOSE BLOOD, BY GLUCOSE MONITORING DEVICE    AMB POC HEMOGLOBIN A1C    levoFLOXacin (LEVAQUIN) 500 mg tablet     Sig: Take 1 Tab by mouth daily. Dispense:  10 Tab     Refill:  0    fluticasone (FLONASE) 50 mcg/actuation nasal spray     Si Sprays by Both Nostrils route daily. Dispense:  1 Bottle     Refill:  12    albuterol (PROVENTIL HFA, VENTOLIN HFA, PROAIR HFA) 90 mcg/actuation inhaler     Sig: Take 2 Puffs by inhalation every four (4) hours as needed for Wheezing. Dispense:  1 Inhaler     Refill:  12    fluticasone-salmeterol (ADVAIR) 100-50 mcg/dose diskus inhaler     Sig: Take 1 Puff by inhalation two (2) times a day.      Dispense:  1 Inhaler     Refill:  12     lose weight, increase physical activity, follow low fat diet, follow low salt diet  There are no Patient Instructions on file for this visit. Follow-up Disposition: Not on File    I have reviewed with the patient details of the assessment and plan and all questions were answered. Relevent patient education was performed    An After Visit Summary was printed and given to the patient. 1. Have you been to the ER, urgent care clinic since your last visit? Hospitalized since your last visit? No    2. Have you seen or consulted any other health care providers outside of the [address] [place] [address] since your last visit? Include any pap smears or colon screening.  No

## 2019-02-22 ENCOUNTER — HOSPITAL ENCOUNTER (OUTPATIENT)
Dept: GENERAL RADIOLOGY | Age: 69
Discharge: HOME | End: 2019-02-22
Payer: MEDICARE

## 2019-02-22 DIAGNOSIS — R05.3 CHRONIC COUGH: ICD-10-CM

## 2019-02-22 LAB
M PNEUMO IGG SER IA-ACNC: 301 U/ML (ref 0–99)
M PNEUMO IGM SER IA-ACNC: <770 U/ML (ref 0–769)

## 2019-02-22 PROCEDURE — 71046 X-RAY EXAM CHEST 2 VIEWS: CPT

## 2019-05-22 ENCOUNTER — OFFICE VISIT (OUTPATIENT)
Dept: INTERNAL MEDICINE CLINIC | Age: 69
End: 2019-05-22

## 2019-05-22 VITALS
TEMPERATURE: 97.9 F | OXYGEN SATURATION: 99 % | HEIGHT: 71 IN | HEART RATE: 84 BPM | DIASTOLIC BLOOD PRESSURE: 70 MMHG | SYSTOLIC BLOOD PRESSURE: 130 MMHG | BODY MASS INDEX: 32.48 KG/M2 | WEIGHT: 232 LBS

## 2019-05-22 DIAGNOSIS — I10 ESSENTIAL HYPERTENSION: Primary | ICD-10-CM

## 2019-05-22 DIAGNOSIS — M47.812 SPONDYLOSIS OF CERVICAL REGION WITHOUT MYELOPATHY OR RADICULOPATHY: ICD-10-CM

## 2019-05-22 DIAGNOSIS — E11.9 TYPE 2 DIABETES MELLITUS WITHOUT COMPLICATION, WITHOUT LONG-TERM CURRENT USE OF INSULIN (HCC): ICD-10-CM

## 2019-05-22 LAB
CHOLEST SERPL-MCNC: 157 MG/DL
GLUCOSE POC: 225 MG/DL
HBA1C MFR BLD HPLC: 9.7 %
HDLC SERPL-MCNC: 65 MG/DL
LDL CHOLESTEROL POC: NORMAL MG/DL
NON-HDL GOAL (POC): 92
TCHOL/HDL RATIO (POC): 2.4
TRIGL SERPL-MCNC: <45 MG/DL

## 2019-05-22 NOTE — PROGRESS NOTES
1. Have you been to the ER, urgent care clinic since your last visit? Hospitalized since your last visit?no    2. Have you seen or consulted any other health care providers outside of the [address] [place] [address] since your last visit? Include any pap smears or colon screening.  No    3 most recent PHQ Screens 5/22/2019   PHQ Not Done -   Little interest or pleasure in doing things Not at all   Feeling down, depressed, irritable, or hopeless Not at all   Total Score PHQ 2 0         Chief Complaint   Patient presents with    Diabetes    Hypertension    Neck Pain

## 2019-05-22 NOTE — PROGRESS NOTES
.my Jason Alfaro is a 76 y.o. male and presents with Diabetes; Hypertension; and Neck Pain  . Subjective:    Neck Pain Review:  Patient complains of neck pain. Onset of symptoms was  weeks ago, gradually worsening since that time. Current symptoms are pain in neck (aching, dull in character; 5//10 in severity), weakness in back. Patient denies numbness, tingling, paresthesias in upper extremities. Patient denies weakness, diminished  strength, lack of coordination. Radiation of pain: . Patient has had no prior neck problems. Previous treatments include: medication: . COPD REVIEW:  The patient is being seen for follow up of chronic coughthe patient has been unstable no,wheezing has been reported  Oxygen: He currently is not on home oxygen therapy. Symptoms: no chronic dyspnea is reported   Patient uses 2 pillows at night. Patient does not continue to smoke cigarettes. Diabetes Mellitus Review:  He has diabetes mellitus. Diabetic ROS - medication compliance: compliant all of the time, diabetic diet compliance: compliant all of the time, home glucose monitoring: is performed. Known diabetic complications: none  Cardiovascular risk factors: family history, dyslipidemia, diabetes mellitus, obesity, hypertension  Current diabetic medications include oral agents  Eye exam current (within one year): no  Weight trend: stable  Prior visit with dietician: no  Current diet: \"healthy\" diet  in general  Current exercise: walking  Current monitoring regimen: home blood tests - daily  Home blood sugar records: trend: stable  Any episodes of hypoglycemia? no  Is He on ACE inhibitor or angiotensin II receptor blocker? Yes     Dyslipidemia Review:  Patient presents for evaluation of lipids. Compliance with treatment thus far has been excellent. A repeat fasting lipid profile was done.   The patient does not use medications that may worsen dyslipidemias (corticosteroids, progestins, anabolic steroids, diuretics, beta-blockers, amiodarone, cyclosporine, olanzapine). The patient exercises some          Review of Systems  Constitutional: negative for fevers, chills, anorexia and weight loss  Eyes:   negative for visual disturbance and irritation  ENT:   negative for tinnitus,sore throat,nasal congestion,ear pains. hoarseness  Respiratory:  negative for cough, hemoptysis, dyspnea,wheezing  CV:   negative for chest pain, palpitations, lower extremity edema  GI:   negative for nausea, vomiting, diarrhea, abdominal pain,melena  Endo:               negative for polyuria,polydipsia,polyphagia,heat intolerance  Genitourinary: negative for frequency, dysuria and hematuria  Integument:  negative for rash and pruritus  Hematologic:  negative for easy bruising and gum/nose bleeding  Musculoskel: negative for myalgias, arthralgias, back pain, muscle weakness, joint pain  Neurological:  negative for headaches, dizziness, vertigo, memory problems and gait   Behavl/Psych: negative for feelings of anxiety, depression, mood changes    Past Medical History:   Diagnosis Date    Diabetes ([facility] [address]      Past Surgical History:   Procedure Laterality Date    HX ORTHOPAEDIC  2012    lumbar    HX PROSTATECTOMY  2012    HX UROLOGICAL  2012     Social History     Socioeconomic History    Marital status: SINGLE     Spouse name: Not on file    Number of children: 2    Years of education: Not on file    Highest education level: Not on file   Tobacco Use    Smoking status: Never Smoker    Smokeless tobacco: Never Used   Substance and Sexual Activity    Alcohol use: No    Drug use: No   Other Topics Concern     Service No    Blood Transfusions No    Caffeine Concern No    Occupational Exposure No    Hobby Hazards No    Sleep Concern No    Stress Concern No    Weight Concern No    Special Diet No    Back Care No    Exercise Yes    Bike Helmet No    Seat Belt No    Self-Exams No     No family history on file.   Current Outpatient Medications   Medication Sig Dispense Refill    glipiZIDE (GLUCOTROL) 10 mg tablet Take 1 Tab by mouth two (2) times a day. 60 Tab 12    atorvastatin (LIPITOR) 40 mg tablet Take 1 Tab by mouth daily. 90 Tab 3    metFORMIN (GLUCOPHAGE) 1,000 mg tablet TAKE ONE TABLET BY MOUTH TWICE DAILY WITH MEALS 180 Tab 3    levoFLOXacin (LEVAQUIN) 500 mg tablet Take 1 Tab by mouth daily. 10 Tab 0    fluticasone (FLONASE) 50 mcg/actuation nasal spray 2 Sprays by Both Nostrils route daily. 1 Bottle 12    albuterol (PROVENTIL HFA, VENTOLIN HFA, PROAIR HFA) 90 mcg/actuation inhaler Take 2 Puffs by inhalation every four (4) hours as needed for Wheezing. 1 Inhaler 12    fluticasone-salmeterol (ADVAIR) 100-50 mcg/dose diskus inhaler Take 1 Puff by inhalation two (2) times a day. 1 Inhaler 12     No Known Allergies    Objective:  Visit Vitals  /70 (BP 1 Location: Right arm, BP Patient Position: Sitting)   Pulse 84   Temp 97.9 °F (36.6 °C) (Oral)   Ht 5' 11\" (1.803 m)   Wt 232 lb (105.2 kg)   SpO2 99%   BMI 32.36 kg/m²     Physical Exam:   General appearance - alert, well appearing, and in no distress  Mental status - alert, oriented to person, place, and time  EYE-DAVIAN, EOMI, corneas normal, no foreign bodies  ENT-ENT exam normal, no neck nodes or sinus tenderness  Nose - normal and patent, no erythema, discharge or polyps  Mouth - mucous membranes moist, pharynx normal without lesions   Chest - clear to auscultation, no wheezes, rales or rhonchi, symmetric air entry   Heart - normal rate, regular rhythm, normal S1, S2, no murmurs, rubs, clicks or gallops   Abdomen - soft, nontender, nondistended, no masses or organomegaly  Lymph- no adenopathy palpable  Ext-peripheral pulses normal, no pedal edema, no clubbing or cyanosis  Skin-Warm and dry.  no hyperpigmentation, vitiligo, or suspicious lesions  Neuro -alert, oriented, normal speech, no focal findings or movement disorder noted  Neck-tenderness over lower cervical spine and nuchal area, reduced painful C-spine range of motion  Feet-no nail deformities or callus formation with good pulses noted      Results for orders placed or performed in visit on 05/22/19   AMB POC GLUCOSE BLOOD, BY GLUCOSE MONITORING DEVICE   Result Value Ref Range    Glucose  mg/dL   AMB POC HEMOGLOBIN A1C   Result Value Ref Range    Hemoglobin A1c (POC) 9.7 %   AMB POC LIPID PROFILE   Result Value Ref Range    Cholesterol (POC) 157     Triglycerides (POC) <45     HDL Cholesterol (POC) 65     LDL Cholesterol (POC) n/a MG/DL    Non-HDL Goal (POC) 92     TChol/HDL Ratio (POC) 2.4        Assessment/Plan:    ICD-10-CM ICD-9-CM    1. Essential hypertension I10 401.9 AMB POC LIPID PROFILE      CBC W/O DIFF      METABOLIC PANEL, COMPREHENSIVE   2. Type 2 diabetes mellitus without complication, without long-term current use of insulin (HCC) E11.9 250.00 AMB POC GLUCOSE BLOOD, BY GLUCOSE MONITORING DEVICE      AMB POC HEMOGLOBIN A1C   3. Spondylosis of cervical region without myelopathy or radiculopathy M47.812 721.0 XR SPINE CERV 4 OR 5 V     Orders Placed This Encounter    XR SPINE CERV 4 OR 5 V     Standing Status:   Future     Standing Expiration Date:   6/21/2020     Order Specific Question:   Reason for Exam     Answer:   neck pains     Order Specific Question:   Is Patient Allergic to Contrast Dye? Answer:   No    CBC W/O DIFF    METABOLIC PANEL, COMPREHENSIVE    AMB POC GLUCOSE BLOOD, BY GLUCOSE MONITORING DEVICE    AMB POC HEMOGLOBIN A1C    AMB POC LIPID PROFILE     lose weight, increase physical activity, follow low fat diet, follow low salt diet  Patient Instructions   [employer] Activation    Thank you for requesting access to [facility]. Please follow the instructions below to securely access and download your online medical record. [facility] allows you to send messages to your doctor, view your test results, renew your prescriptions, schedule appointments, and more.     How Do I Sign Up?    1. In your internet browser, go to [email or web address]  2. Click on the First Time User? Click Here link in the Sign In box. You will be redirect to the New Member Sign Up page. 3. Enter your [employer] Access Code exactly as it appears below. You will not need to use this code after youve completed the sign-up process. If you do not sign up before the expiration date, you must request a new code. [employer] Access Code: [email or web address]  Expires: 2019 10:15 AM (This is the date your [employer] access code will )    4. Enter the last four digits of your Social Security Number (xxxx) and Date of Birth (mm/dd/yyyy) as indicated and click Submit. You will be taken to the next sign-up page. 5. Create a [facility] ID. This will be your [employer] login ID and cannot be changed, so think of one that is secure and easy to remember. 6. Create a [employer] password. You can change your password at any time. 7. Enter your Password Reset Question and Answer. This can be used at a later time if you forget your password. 8. Enter your e-mail address. You will receive e-mail notification when new information is available in [address] 9. Click Sign Up. You can now view and download portions of your medical record. 10. Click the Download Summary menu link to download a portable copy of your medical information. Additional Information    If you have questions, please visit the Frequently Asked Questions section of the [employer] website at [email or web address] Remember, [employer] is NOT to be used for urgent needs. For medical emergencies, dial 911. Follow-up and Dispositions    · Return in about 1 month (around 2019), or if symptoms worsen or fail to improve. I have reviewed with the patient details of the assessment and plan and all questions were answered. Relevent patient education was performed    An After Visit Summary was printed and given to the patient.

## 2019-05-22 NOTE — PATIENT INSTRUCTIONS
[employer] Activation    Thank you for requesting access to [employer]. Please follow the instructions below to securely access and download your online medical record. [facility] allows you to send messages to your doctor, view your test results, renew your prescriptions, schedule appointments, and more. How Do I Sign Up? 1. In your internet browser, go to [email or web address]  2. Click on the First Time User? Click Here link in the Sign In box. You will be redirect to the New Member Sign Up page. 3. Enter your [facility] Access Code exactly as it appears below. You will not need to use this code after youve completed the sign-up process. If you do not sign up before the expiration date, you must request a new code. [facility] Access Code: [email or web address]  Expires: 2019 10:15 AM (This is the date your [facility] access code will )    4. Enter the last four digits of your Social Security Number (xxxx) and Date of Birth (mm/dd/yyyy) as indicated and click Submit. You will be taken to the next sign-up page. 5. Create a [facility]nos ID. This will be your [facility] login ID and cannot be changed, so think of one that is secure and easy to remember. 6. Create a [facility] password. You can change your password at any time. 7. Enter your Password Reset Question and Answer. This can be used at a later time if you forget your password. 8. Enter your e-mail address. You will receive e-mail notification when new information is available in [address] 9. Click Sign Up. You can now view and download portions of your medical record. 10. Click the Download Summary menu link to download a portable copy of your medical information. Additional Information    If you have questions, please visit the Frequently Asked Questions section of the [employer] website at [email or web address] Remember, [facility] is NOT to be used for urgent needs. For medical emergencies, dial 911.

## 2019-05-23 LAB
ALBUMIN SERPL-MCNC: 4.4 G/DL (ref 3.6–4.8)
ALBUMIN/GLOB SERPL: 1.5 {RATIO} (ref 1.2–2.2)
ALP SERPL-CCNC: 122 IU/L (ref 39–117)
ALT SERPL-CCNC: 37 IU/L (ref 0–44)
AST SERPL-CCNC: 42 IU/L (ref 0–40)
BILIRUB SERPL-MCNC: 0.6 MG/DL (ref 0–1.2)
BUN SERPL-MCNC: 11 MG/DL (ref 8–27)
BUN/CREAT SERPL: 18 (ref 10–24)
CALCIUM SERPL-MCNC: 10 MG/DL (ref 8.6–10.2)
CHLORIDE SERPL-SCNC: 102 MMOL/L (ref 96–106)
CO2 SERPL-SCNC: 25 MMOL/L (ref 20–29)
CREAT SERPL-MCNC: 0.61 MG/DL (ref 0.76–1.27)
ERYTHROCYTE [DISTWIDTH] IN BLOOD BY AUTOMATED COUNT: 13.6 % (ref 12.3–15.4)
GLOBULIN SER CALC-MCNC: 2.9 G/DL (ref 1.5–4.5)
GLUCOSE SERPL-MCNC: 209 MG/DL (ref 65–99)
HCT VFR BLD AUTO: 37.9 % (ref 37.5–51)
HGB BLD-MCNC: 12.5 G/DL (ref 13–17.7)
MCH RBC QN AUTO: 29.7 PG (ref 26.6–33)
MCHC RBC AUTO-ENTMCNC: 33 G/DL (ref 31.5–35.7)
MCV RBC AUTO: 90 FL (ref 79–97)
PLATELET # BLD AUTO: 261 X10E3/UL (ref 150–450)
POTASSIUM SERPL-SCNC: 4.8 MMOL/L (ref 3.5–5.2)
PROT SERPL-MCNC: 7.3 G/DL (ref 6–8.5)
RBC # BLD AUTO: 4.21 X10E6/UL (ref 4.14–5.8)
SODIUM SERPL-SCNC: 141 MMOL/L (ref 134–144)
WBC # BLD AUTO: 5.8 X10E3/UL (ref 3.4–10.8)

## 2019-05-24 ENCOUNTER — HOSPITAL ENCOUNTER (OUTPATIENT)
Dept: GENERAL RADIOLOGY | Age: 69
Discharge: HOME | End: 2019-05-24
Payer: MEDICARE

## 2019-05-24 DIAGNOSIS — M47.812 SPONDYLOSIS OF CERVICAL REGION WITHOUT MYELOPATHY OR RADICULOPATHY: ICD-10-CM

## 2019-05-24 PROCEDURE — 72050 X-RAY EXAM NECK SPINE 4/5VWS: CPT

## 2019-06-20 ENCOUNTER — OFFICE VISIT (OUTPATIENT)
Dept: INTERNAL MEDICINE CLINIC | Age: 69
End: 2019-06-20

## 2019-06-20 VITALS
RESPIRATION RATE: 16 BRPM | HEART RATE: 83 BPM | TEMPERATURE: 98.5 F | OXYGEN SATURATION: 97 % | HEIGHT: 71 IN | SYSTOLIC BLOOD PRESSURE: 120 MMHG | DIASTOLIC BLOOD PRESSURE: 72 MMHG | WEIGHT: 230 LBS | BODY MASS INDEX: 32.2 KG/M2

## 2019-06-20 DIAGNOSIS — E11.49 TYPE II OR UNSPECIFIED TYPE DIABETES MELLITUS WITH NEUROLOGICAL MANIFESTATIONS, UNCONTROLLED(250.62) (HCC): Primary | ICD-10-CM

## 2019-06-20 DIAGNOSIS — Z00.00 MEDICARE ANNUAL WELLNESS VISIT, SUBSEQUENT: ICD-10-CM

## 2019-06-20 DIAGNOSIS — E78.00 HYPERCHOLESTEREMIA: ICD-10-CM

## 2019-06-20 DIAGNOSIS — I10 ESSENTIAL HYPERTENSION: ICD-10-CM

## 2019-06-20 LAB
GLUCOSE POC: 241 MG/DL
HBA1C MFR BLD HPLC: 8.7 %

## 2019-06-20 NOTE — PATIENT INSTRUCTIONS
[employer] Activation    Thank you for requesting access to [employer]. Please follow the instructions below to securely access and download your online medical record. [employer] allows you to send messages to your doctor, view your test results, renew your prescriptions, schedule appointments, and more. How Do I Sign Up? 1. In your internet browser, go to [email or web address]  2. Click on the First Time User? Click Here link in the Sign In box. You will be redirect to the New Member Sign Up page. 3. Enter your [employer] Access Code exactly as it appears below. You will not need to use this code after youve completed the sign-up process. If you do not sign up before the expiration date, you must request a new code. [employer] Access Code: [email or web address]  Expires: 2019 10:15 AM (This is the date your [employer] access code will )    4. Enter the last four digits of your Social Security Number (xxxx) and Date of Birth (mm/dd/yyyy) as indicated and click Submit. You will be taken to the next sign-up page. 5. Create a [employer] ID. This will be your [employer] login ID and cannot be changed, so think of one that is secure and easy to remember. 6. Create a [employer] password. You can change your password at any time. 7. Enter your Password Reset Question and Answer. This can be used at a later time if you forget your password. 8. Enter your e-mail address. You will receive e-mail notification when new information is available in [address] 9. Click Sign Up. You can now view and download portions of your medical record. 10. Click the Download Summary menu link to download a portable copy of your medical information. Additional Information    If you have questions, please visit the Frequently Asked Questions section of the [employer] website at [email or web address] Remember, [employer] is NOT to be used for urgent needs. For medical emergencies, dial 911.

## 2019-06-20 NOTE — PROGRESS NOTES
1. Have you been to the ER, urgent care clinic since your last visit? Hospitalized since your last visit?no    2. Have you seen or consulted any other health care providers outside of the [address] [place] [address] since your last visit? Include any pap smears or colon screening. No    3 most recent PHQ Screens 5/22/2019   PHQ Not Done -   Little interest or pleasure in doing things Not at all   Feeling down, depressed, irritable, or hopeless Not at all   Total Score PHQ 2 0     Chief Complaint   Patient presents with   [facility] Annual Wellness Visit     Per Dr. Yajaira Valentin,  verbal order given for needed amb poc labs.

## 2019-06-20 NOTE — PROGRESS NOTES
Daphnie Casey is a 76 y.o. male and presents with Diabetes and Annual Wellness Visit  . Subjective:  Hypertension Review:  The patient has hypertension . Diet and Lifestyle: generally follows a low sodium diet, exercises sporadically  Home BP Monitoring: is not measured at home. Pertinent ROS: taking medications as instructed, no medication side effects noted, no TIA's, no chest pain on exertion, no dyspnea on exertion, no swelling of ankles. Dyslipidemia Review:  Patient presents for evaluation of lipids. Compliance with treatment thus far has been excellent. A repeat fasting lipid profile was done. The patient does not use medications that may worsen dyslipidemias . The patient exercises sporadically. Diabetes Mellitus Review:  He has diabetes mellitus. Diabetic ROS - medication compliance: compliant all of the time, diabetic diet compliance: compliant all of the time, home glucose monitoring: is performed. Known diabetic complications: none  Cardiovascular risk factors: family history, dyslipidemia, diabetes mellitus, obesity, hypertension  Current diabetic medications include oral agents/insulin   Eye exam current (within one year): no  Weight trend: stable  Prior visit with dietician: no  Current diet: \"healthy\" diet  in general  Current exercise: walking  Current monitoring regimen: home blood tests - daily  Home blood sugar records: trend: stable  Any episodes of hypoglycemia? no  Is He on ACE inhibitor or angiotensin II receptor blocker? Yes   Lab review: orders written for new lab studies as appropriate; see orders. Marcell Yu is a 76 y.o. male and presents for annual Medicare Wellness Visit. Problem List: Reviewed with patient and discussed risk factors. There is no problem list on file for this patient.       Current medical providers:  Patient Care Team:  Karen Stern MD as PCP - General (Internal Medicine)    PSH: Reviewed with patient  Past Surgical History:   Procedure Laterality Date    HX ORTHOPAEDIC  2012    lumbar    HX PROSTATECTOMY  2012    HX UROLOGICAL  2012        SH: Reviewed with patient  Social History     Tobacco Use    Smoking status: Never Smoker    Smokeless tobacco: Never Used   Substance Use Topics    Alcohol use: No    Drug use: No       FH: Reviewed with patient  History reviewed. No pertinent family history. Medications/Allergies: Reviewed with patient  Current Outpatient Medications on File Prior to Visit   Medication Sig Dispense Refill    albuterol (PROVENTIL HFA, VENTOLIN HFA, PROAIR HFA) 90 mcg/actuation inhaler Take 2 Puffs by inhalation every four (4) hours as needed for Wheezing. 1 Inhaler 12    fluticasone-salmeterol (ADVAIR) 100-50 mcg/dose diskus inhaler Take 1 Puff by inhalation two (2) times a day. 1 Inhaler 12    glipiZIDE (GLUCOTROL) 10 mg tablet Take 1 Tab by mouth two (2) times a day. 60 Tab 12    atorvastatin (LIPITOR) 40 mg tablet Take 1 Tab by mouth daily. 90 Tab 3    metFORMIN (GLUCOPHAGE) 1,000 mg tablet TAKE ONE TABLET BY MOUTH TWICE DAILY WITH MEALS 180 Tab 3     No current facility-administered medications on file prior to visit. No Known Allergies    Objective:  Visit Vitals  /72   Pulse 83   Temp 98.5 °F (36.9 °C) (Oral)   Resp 16   Ht 5' 11\" (1.803 m)   Wt 230 lb (104.3 kg)   SpO2 97%   BMI 32.08 kg/m²    Body mass index is 32.08 kg/m².     Assessment of cognitive impairment: Alert and oriented x 3    Depression Screen:   3 most recent PHQ Screens 6/20/2019   PHQ Not Done -   Little interest or pleasure in doing things Not at all   Feeling down, depressed, irritable, or hopeless Not at all   Total Score PHQ 2 0     Depression Review:  Patient is seen for screen of depression,denies anhedonia, weight gain, insomnia, hypersomnia, psychomotor agitation, psychomotor retardation, fatigue, feelings of worthlessness/guilt, difficulty concentrating, hopelessness, impaired memory and recurrent thoughts of death Treatment includes no medication   She denies recurrent thoughts of death and suicidal thoughts without plan. Fall Risk Assessment:    Fall Risk Assessment, last 12 mths 6/20/2019   Able to walk? Yes   Fall in past 12 months? No       Functional Ability:   Does the patient exhibit a steady gait? yes   How long did it take the patient to get up and walk from a sitting position? seconds   Is the patient self reliant?  (ie can do own laundry, meals, household chores)  yes     Does the patient handle his/her own medications? yes     Does the patient handle his/her own money? yes     Is the patients home safe (ie good lighting, handrails on stairs and bath, etc.)? yes     Did you notice or did patient express any hearing difficulties? no     Did you notice or did patient express any vision difficulties?   no     Were distance and reading eye charts used? no       Advance Care Planning:   Patient was offered the opportunity to discuss advance care planning:  yes     Does patient have an Advance Directive:  no   If no, did you provide information on C[facility] Connections? yes       Plan:      Orders Placed This Encounter    AMB POC GLUCOSE BLOOD, BY GLUCOSE MONITORING DEVICE    AMB POC HEMOGLOBIN A1C    SITagliptin (JANUVIA) 100 mg tablet       Health Maintenance   Topic Date Due    EYE EXAM RETINAL OR DILATED  12/05/1960    GLAUCOMA SCREENING Q2Y  12/05/2015    Pneumococcal 65+ years (1 of 2 - PCV13) 12/05/2015    MICROALBUMIN Q1  01/24/2019    MEDICARE YEARLY EXAM  05/24/2019    Shingrix Vaccine Age 50> (1 of 2) 09/25/2019 (Originally 12/5/2000)    Influenza Age 5 to Adult  08/01/2019    HEMOGLOBIN A1C Q6M  11/22/2019    FOBT Q 1 YEAR AGE 50-75  11/28/2019    FOOT EXAM Q1  02/20/2020    LIPID PANEL Q1  05/22/2020    DTaP/Tdap/Td series (2 - Td) 01/24/2028    Hepatitis C Screening  Completed       *Patient verbalized understanding and agreement with the plan.   A copy of the After Visit Summary with personalized health plan was given to the patient today. Review of Systems  Constitutional: negative for fevers, chills, anorexia and weight loss  Eyes:   negative for visual disturbance and irritation  ENT:   negative for tinnitus,sore throat,nasal congestion,ear pains. hoarseness  Respiratory:  negative for cough, hemoptysis, dyspnea,wheezing  CV:   negative for chest pain, palpitations, lower extremity edema  GI:   negative for nausea, vomiting, diarrhea, abdominal pain,melena  Endo:               negative for polyuria,polydipsia,polyphagia,heat intolerance  Genitourinary: negative for frequency, dysuria and hematuria  Integument:  negative for rash and pruritus  Hematologic:  negative for easy bruising and gum/nose bleeding  Musculoskel: negative for myalgias, arthralgias, back pain, muscle weakness, joint pain  Neurological:  negative for headaches, dizziness, vertigo, memory problems and gait   Behavl/Psych: negative for feelings of anxiety, depression, mood changes    Past Medical History:   Diagnosis Date    [facility]      Past Surgical History:   Procedure Laterality Date    HX ORTHOPAEDIC  2012    lumbar    HX PROSTATECTOMY  2012    HX UROLOGICAL  2012     Social History     Socioeconomic History    Marital status: SINGLE     Spouse name: Not on file    Number of children: 2    Years of education: Not on file    Highest education level: Not on file   Tobacco Use    Smoking status: Never Smoker    Smokeless tobacco: Never Used   Substance and Sexual Activity    Alcohol use: No    Drug use: No   Other Topics Concern     Service No    Blood Transfusions No    Caffeine Concern No    Occupational Exposure No    Hobby Hazards No    Sleep Concern No    Stress Concern No    Weight Concern No    Special Diet No    Back Care No    Exercise Yes    Bike Helmet No    Seat Belt No    Self-Exams No     History reviewed.  No pertinent family history. Current Outpatient Medications   Medication Sig Dispense Refill    SITagliptin (JANUVIA) 100 mg tablet Take 1 Tab by mouth daily. 30 Tab 12    albuterol (PROVENTIL HFA, VENTOLIN HFA, PROAIR HFA) 90 mcg/actuation inhaler Take 2 Puffs by inhalation every four (4) hours as needed for Wheezing. 1 Inhaler 12    fluticasone-salmeterol (ADVAIR) 100-50 mcg/dose diskus inhaler Take 1 Puff by inhalation two (2) times a day. 1 Inhaler 12    glipiZIDE (GLUCOTROL) 10 mg tablet Take 1 Tab by mouth two (2) times a day. 60 Tab 12    atorvastatin (LIPITOR) 40 mg tablet Take 1 Tab by mouth daily. 90 Tab 3    metFORMIN (GLUCOPHAGE) 1,000 mg tablet TAKE ONE TABLET BY MOUTH TWICE DAILY WITH MEALS 180 Tab 3     No Known Allergies    Objective:  Visit Vitals  /72   Pulse 83   Temp 98.5 °F (36.9 °C) (Oral)   Resp 16   Ht 5' 11\" (1.803 m)   Wt 230 lb (104.3 kg)   SpO2 97%   BMI 32.08 kg/m²     Physical Exam:   General appearance - alert, well appearing, and in no distress  Mental status - alert, oriented to person, place, and time  EYE-DVAIAN, EOMI, corneas normal, no foreign bodies  ENT-ENT exam normal, no neck nodes or sinus tenderness  Nose - normal and patent, no erythema, discharge or polyps  Mouth - mucous membranes moist, pharynx normal without lesions  Neck - supple, no significant adenopathy   Chest - clear to auscultation, no wheezes, rales or rhonchi, symmetric air entry   Heart - normal rate, regular rhythm, normal S1, S2, no murmurs, rubs, clicks or gallops   Abdomen - soft, nontender, nondistended, no masses or organomegaly  Lymph- no adenopathy palpable  Ext-peripheral pulses normal, no pedal edema, no clubbing or cyanosis  Skin-Warm and dry.  no hyperpigmentation, vitiligo, or suspicious lesions  Neuro -alert, oriented, normal speech, no focal findings or movement disorder noted  Neck-normal C-spine, no tenderness, full ROM without pain  Feet-no nail deformities or callus formation with good pulses noted      Results for orders placed or performed in visit on 19   AMB POC GLUCOSE BLOOD, BY GLUCOSE MONITORING DEVICE   Result Value Ref Range    Glucose  mg/dL   AMB POC HEMOGLOBIN A1C   Result Value Ref Range    Hemoglobin A1c (POC) 8.7 %       Assessment/Plan:    ICD-10-CM ICD-9-CM    1. Type II or unspecified type diabetes mellitus with neurological manifestations, uncontrolled(250.62) (HCC) E11.49 250.62 AMB POC GLUCOSE BLOOD, BY GLUCOSE MONITORING DEVICE      AMB POC HEMOGLOBIN A1C   2. Medicare annual wellness visit, subsequent Z00.00 V70.0    3. Essential hypertension I10 401.9    4. Hypercholesteremia E78.00 272.0      Orders Placed This Encounter    AMB POC GLUCOSE BLOOD, BY GLUCOSE MONITORING DEVICE    AMB POC HEMOGLOBIN A1C    SITagliptin (JANUVIA) 100 mg tablet     Sig: Take 1 Tab by mouth daily. Dispense:  30 Tab     Refill:  12     lose weight, increase physical activity, follow low fat diet, follow low salt diet  Patient Instructions   [employer] Activation    Thank you for requesting access to [employer]. Please follow the instructions below to securely access and download your online medical record. [employer] allows you to send messages to your doctor, view your test results, renew your prescriptions, schedule appointments, and more. How Do I Sign Up? 1. In your internet browser, go to [email or web address]  2. Click on the First Time User? Click Here link in the Sign In box. You will be redirect to the New Member Sign Up page. 3. Enter your [employer] Access Code exactly as it appears below. You will not need to use this code after youve completed the sign-up process. If you do not sign up before the expiration date, you must request a new code. [employer] Access Code: [email or web address]  Expires: 2019 10:15 AM (This is the date your [employer] access code will )    4.  Enter the last four digits of your Social Security Number (xxxx) and Date of Birth (mm/dd/yyyy) as indicated and click Submit. You will be taken to the next sign-up page. 5. Create a [place] ID. This will be your [employer] login ID and cannot be changed, so think of one that is secure and easy to remember. 6. Create a [employer] password. You can change your password at any time. 7. Enter your Password Reset Question and Answer. This can be used at a later time if you forget your password. 8. Enter your e-mail address. You will receive e-mail notification when new information is available in [address]. 9. Click Sign Up. You can now view and download portions of your medical record. 10. Click the Download Summary menu link to download a portable copy of your medical information. Additional Information    If you have questions, please visit the Frequently Asked Questions section of the [employer] website at [email or web address] Remember, [employer] is NOT to be used for urgent needs. For medical emergencies, dial 911. Follow-up and Dispositions    · Return in about 3 months (around 9/20/2019), or if symptoms worsen or fail to improve. I have reviewed with the patient details of the assessment and plan and all questions were answered. Relevent patient education was performed    An After Visit Summary was printed and given to the patient.

## 2019-07-28 RX ORDER — METFORMIN HYDROCHLORIDE 1000 MG/1
TABLET ORAL
Qty: 180 TAB | Refills: 3 | Status: SHIPPED | OUTPATIENT
Start: 2019-07-28 | End: 2019-10-21 | Stop reason: SDUPTHER

## 2019-07-28 RX ORDER — ATORVASTATIN CALCIUM 40 MG/1
TABLET, FILM COATED ORAL
Qty: 90 TAB | Refills: 3 | Status: SHIPPED | OUTPATIENT
Start: 2019-07-28 | End: 2019-10-21 | Stop reason: SDUPTHER

## 2019-09-19 ENCOUNTER — OFFICE VISIT (OUTPATIENT)
Dept: INTERNAL MEDICINE CLINIC | Age: 69
End: 2019-09-19

## 2019-09-19 VITALS
OXYGEN SATURATION: 97 % | SYSTOLIC BLOOD PRESSURE: 126 MMHG | WEIGHT: 231.7 LBS | DIASTOLIC BLOOD PRESSURE: 82 MMHG | HEIGHT: 71 IN | RESPIRATION RATE: 16 BRPM | HEART RATE: 86 BPM | TEMPERATURE: 97.9 F | BODY MASS INDEX: 32.44 KG/M2

## 2019-09-19 DIAGNOSIS — D17.20 LIPOMA OF UPPER ARM: ICD-10-CM

## 2019-09-19 DIAGNOSIS — E78.00 HYPERCHOLESTEREMIA: ICD-10-CM

## 2019-09-19 DIAGNOSIS — G56.22 ULNAR NEUROPATHY OF LEFT UPPER EXTREMITY: ICD-10-CM

## 2019-09-19 DIAGNOSIS — I10 ESSENTIAL HYPERTENSION: ICD-10-CM

## 2019-09-19 DIAGNOSIS — E11.49 TYPE II OR UNSPECIFIED TYPE DIABETES MELLITUS WITH NEUROLOGICAL MANIFESTATIONS, UNCONTROLLED(250.62) (HCC): Primary | ICD-10-CM

## 2019-09-19 LAB
CHOLEST SERPL-MCNC: 137 MG/DL
GLUCOSE POC: 275 MG/DL
HBA1C MFR BLD HPLC: 10.3 %
HDLC SERPL-MCNC: 57 MG/DL
LDL CHOLESTEROL POC: NORMAL MG/DL
NON-HDL CHOLESTEROL, 011976: 809
TCHOL/HDL RATIO (POC): 2.4
TRIGL SERPL-MCNC: 45 MG/DL

## 2019-09-19 NOTE — PATIENT INSTRUCTIONS
[facility] Activation    Thank you for requesting access to [employer]. Please follow the instructions below to securely access and download your online medical record. [employer] [facility] allows you to send messages to your doctor, view your test results, renew your prescriptions, schedule appointments, and more. How Do I Sign Up? 1. In your internet browser, go to [email or web address]  2. Click on the First Time User? Click Here link in the Sign In box. You will be redirect to the New Member Sign Up page. 3. Enter your [employer] Access Code exactly as it appears below. You will not need to use this code after youve completed the sign-up process. If you do not sign up before the expiration date, you must request a new code. [employer] Access Code: [email or web address]  Expires: 11/3/2019  9:50 AM (This is the date your [employer] [facility] access code will )    4. Enter the last four digits of your Social Security Number (xxxx) and Date of Birth (mm/dd/yyyy) as indicated and click Submit. You will be taken to the next sign-up page. 5. Create a [employer] [facility] ID. This will be your [employer] login ID and cannot be changed, so think of one that is secure and easy to remember. 6. Create a [employer] password. You can change your password at any time. 7. Enter your Password Reset Question and Answer. This can be used at a later time if you forget your password. 8. Enter your e-mail address. You will receive e-mail notification when new information is available in [address] 9. Click Sign Up. You can now view and download portions of your medical record. 10. Click the Download Summary menu link to download a portable copy of your medical information. Additional Information    If you have questions, please visit the Frequently Asked Questions section of the [employer] website at [email or web address] Remember, [employer] is NOT to be used for urgent needs. For medical emergencies, dial 911.

## 2019-09-19 NOTE — PROGRESS NOTES
Israel Cleveland is a 76 y.o. male and presents with Cholesterol Problem (3 month follow up); Diabetes (3 month follow up); and Hypertension (3 month follow up)  . Subjective:  Hypertension Review:  The patient has hypertension . Diet and Lifestyle: generally follows a low sodium diet, exercises sporadically  Home BP Monitoring: is not measured at home. Pertinent ROS: taking medications as instructed, no medication side effects noted, no TIA's, no chest pain on exertion, no dyspnea on exertion, no swelling of ankles. Dyslipidemia Review:  Patient presents for evaluation of lipids. Compliance with treatment thus far has been excellent. A repeat fasting lipid profile was done. The patient does not use medications that may worsen dyslipidemias . The patient exercises sporadically. Diabetes Mellitus Review:  He has diabetes mellitus. Diabetic ROS - medication compliance: compliant all of the time, diabetic diet compliance: compliant all of the time, home glucose monitoring: is performed. Known diabetic complications: none  Cardiovascular risk factors: family history, dyslipidemia, diabetes mellitus, obesity, hypertension  Current diabetic medications include oral agents/insulin   Eye exam current (within one year): no  Weight trend: stable  Prior visit with dietician: no  Current diet: \"healthy\" diet  in general  Current exercise: walking  Current monitoring regimen: home blood tests - daily  Home blood sugar records: trend: stable  Any episodes of hypoglycemia? no  Is He on ACE inhibitor or angiotensin II receptor blocker? Yes   Lab review: orders written for new lab studies as appropriate; see orders. He has swelling involving the lt.wrist region      He has had numbness of the lt. 4th and 5th fingers    Review of Systems  Constitutional: negative for fevers, chills, anorexia and weight loss  Eyes:   negative for visual disturbance and irritation  ENT:   negative for tinnitus,sore throat,nasal congestion,ear pains. hoarseness  Respiratory:  negative for cough, hemoptysis, dyspnea,wheezing  CV:   negative for chest pain, palpitations, lower extremity edema  GI:   negative for nausea, vomiting, diarrhea, abdominal pain,melena  Endo:               negative for polyuria,polydipsia,polyphagia,heat intolerance  Genitourinary: negative for frequency, dysuria and hematuria  Integument:  negative for rash and pruritus  Hematologic:  negative for easy bruising and gum/nose bleeding  Musculoskel: negative for myalgias, arthralgias, back pain, muscle weakness, joint pain  Neurological:  negative for headaches, dizziness, vertigo, memory problems and gait   Behavl/Psych: negative for feelings of anxiety, depression, mood changes    Past Medical History:   Diagnosis Date    Diabetes ([facility] [address]      Past Surgical History:   Procedure Laterality Date    HX ORTHOPAEDIC  2012    lumbar    HX PROSTATECTOMY  2012    HX UROLOGICAL  2012     Social History     Socioeconomic History    Marital status: SINGLE     Spouse name: Not on file    Number of children: 2    Years of education: Not on file    Highest education level: Not on file   Tobacco Use    Smoking status: Never Smoker    Smokeless tobacco: Never Used   Substance and Sexual Activity    Alcohol use: No    Drug use: No   Other Topics Concern     Service No    Blood Transfusions No    Caffeine Concern No    Occupational Exposure No    Hobby Hazards No    Sleep Concern No    Stress Concern No    Weight Concern No    Special Diet No    Back Care No    Exercise Yes    Bike Helmet No    Seat Belt No    Self-Exams No     History reviewed. No pertinent family history. Current Outpatient Medications   Medication Sig Dispense Refill    SITagliptin (JANUVIA) 100 mg tablet Take 1 Tab by mouth daily.  30 Tab 12    metFORMIN (GLUCOPHAGE) 1,000 mg tablet TAKE 1 TABLET BY MOUTH TWICE DAILY WITH  A  MEAL 180 Tab 3    atorvastatin (LIPITOR) 40 mg tablet TAKE 1 TABLET BY MOUTH ONCE DAILY 90 Tab 3    glipiZIDE (GLUCOTROL) 10 mg tablet Take 1 Tab by mouth two (2) times a day. 60 Tab 12    albuterol (PROVENTIL HFA, VENTOLIN HFA, PROAIR HFA) 90 mcg/actuation inhaler Take 2 Puffs by inhalation every four (4) hours as needed for Wheezing. 1 Inhaler 12    fluticasone-salmeterol (ADVAIR) 100-50 mcg/dose diskus inhaler Take 1 Puff by inhalation two (2) times a day. 1 Inhaler 12     No Known Allergies    Objective:  Visit Vitals  /82 (BP 1 Location: Right arm, BP Patient Position: Sitting)   Pulse 86   Temp 97.9 °F (36.6 °C)   Resp 16   Ht 5' 11\" (1.803 m)   Wt 231 lb 11.2 oz (105.1 kg)   SpO2 97%   BMI 32.32 kg/m²     Physical Exam:   General appearance - alert, well appearing, and in no distress  Mental status - alert, oriented to person, place, and time  EYE-DAVIAN, EOMI, corneas normal, no foreign bodies  ENT-ENT exam normal, no neck nodes or sinus tenderness  Nose - normal and patent, no erythema, discharge or polyps  Mouth - mucous membranes moist, pharynx normal without lesions  Neck - supple, no significant adenopathy   Chest - clear to auscultation, no wheezes, rales or rhonchi, symmetric air entry   Heart - normal rate, regular rhythm, normal S1, S2, no murmurs, rubs, clicks or gallops   Abdomen - soft, nontender, nondistended, no masses or organomegaly  Lymph- no adenopathy palpable  Ext-peripheral pulses normal, no pedal edema, no clubbing or cyanosis  Skin-Warm and dry.  no hyperpigmentation, vitiligo, or suspicious lesions  Neuro -alert, oriented, normal speech, no focal findings or movement disorder noted  Neck-normal C-spine, no tenderness, full ROM without pain  Feet-no nail deformities or callus formation with good pulses noted  Lt.wrist-quarter size soft tissue mass    Results for orders placed or performed in visit on 09/19/19   AMB POC LIPID PROFILE   Result Value Ref Range    Cholesterol (POC) 137     Triglycerides (POC) 45     HDL Cholesterol (POC) 57     Non-HDL Cholesterol 809     LDL Cholesterol (POC) n/a MG/DL    TChol/HDL Ratio (POC) 2.4    AMB POC HEMOGLOBIN A1C   Result Value Ref Range    Hemoglobin A1c (POC) 10.3 %   AMB POC GLUCOSE BLOOD, BY GLUCOSE MONITORING DEVICE   Result Value Ref Range    Glucose  mg/dL       Assessment/Plan:    ICD-10-CM ICD-9-CM    1. Type II or unspecified type diabetes mellitus with neurological manifestations, uncontrolled(250.62) (HCC) E11.49 250.62 AMB POC HEMOGLOBIN A1C      AMB POC GLUCOSE BLOOD, BY GLUCOSE MONITORING DEVICE   2. Essential hypertension I10 401.9 AMB POC LIPID PROFILE   3. Hypercholesteremia E78.00 272.0 AMB POC LIPID PROFILE   4. Ulnar neuropathy of left upper extremity G56.22 354.2    5. Lipoma of upper arm D17.79 214.8      Orders Placed This Encounter    AMB POC LIPID PROFILE    AMB POC HEMOGLOBIN A1C    AMB POC GLUCOSE BLOOD, BY GLUCOSE MONITORING DEVICE    SITagliptin (JANUVIA) 100 mg tablet     Sig: Take 1 Tab by mouth daily. Dispense:  30 Tab     Refill:  12     lose weight, increase physical activity, follow low fat diet, follow low salt diet  Patient Instructions   [employer] Activation    Thank you for requesting access to [employer]. Please follow the instructions below to securely access and download your online medical record. [employer] allows you to send messages to your doctor, view your test results, renew your prescriptions, schedule appointments, and more. How Do I Sign Up? 1. In your internet browser, go to [email or web address]  2. Click on the First Time User? Click Here link in the Sign In box. You will be redirect to the New Member Sign Up page. 3. Enter your [employer] Access Code exactly as it appears below. You will not need to use this code after youve completed the sign-up process. If you do not sign up before the expiration date, you must request a new code.     Goshi Access Code: [email or web address]  Expires: 11/3/2019  9:50 AM (This is the date your [employer]enga access code will )    4. Enter the last four digits of your Social Security Number (xxxx) and Date of Birth (mm/dd/yyyy) as indicated and click Submit. You will be taken to the next sign-up page. 5. Create a Screenleapt ID. This will be your Venga login ID and cannot be changed, so think of one that is secure and easy to remember. 6. Create a Venga password. You can change your password at any time. 7. Enter your Password Reset Question and Answer. This can be used at a later time if you forget your password. 8. Enter your e-mail address. You will receive e-mail notification when new information is available in [address] 9. Click Sign Up. You can now view and download portions of your medical record. 10. Click the Download Summary menu link to download a portable copy of your medical information. Additional Information    If you have questions, please visit the Frequently Asked Questions section of the Venga website at [email or web address] Remember, Venga is NOT to be used for urgent needs. For medical emergencies, dial 911. Follow-up and Dispositions    · Return in about 4 weeks (around 10/17/2019), or if symptoms worsen or fail to improve. I have reviewed with the patient details of the assessment and plan and all questions were answered. Relevent patient education was performed    An After Visit Summary was printed and given to the patient.

## 2019-09-19 NOTE — PROGRESS NOTES
Nancy Weldon is a 76 y.o. male     Chief Complaint   Patient presents with    Cholesterol Problem     3 month follow up    Diabetes     3 month follow up    Hypertension     3 month follow up       Visit Vitals  /82 (BP 1 Location: Right arm, BP Patient Position: Sitting)   Pulse 86   Temp 97.9 °F (36.6 °C)   Resp 16   Ht 5' 11\" (1.803 m)   Wt 231 lb 11.2 oz (105.1 kg)   SpO2 97%   BMI 32.32 kg/m²       Health Maintenance Due   Topic Date Due    EYE EXAM RETINAL OR DILATED  12/05/1960    GLAUCOMA SCREENING Q2Y  12/05/2015    Pneumococcal 65+ years (1 of 2 - PCV13) 12/05/2015    MICROALBUMIN Q1  01/24/2019    Influenza Age 9 to Adult  08/01/2019       1. Have you been to the ER, urgent care clinic since your last visit? Hospitalized since your last visit? No    2. Have you seen or consulted any other health care providers outside of the [address] [place] [address] since your last visit? Include any pap smears or colon screening.  No

## 2019-10-02 RX ORDER — GLIPIZIDE 10 MG/1
TABLET ORAL
Qty: 60 TAB | Refills: 12 | Status: SHIPPED | OUTPATIENT
Start: 2019-10-02 | End: 2019-10-21 | Stop reason: SDUPTHER

## 2019-10-21 ENCOUNTER — OFFICE VISIT (OUTPATIENT)
Dept: INTERNAL MEDICINE CLINIC | Age: 69
End: 2019-10-21

## 2019-10-21 VITALS
HEART RATE: 95 BPM | RESPIRATION RATE: 16 BRPM | BODY MASS INDEX: 31.22 KG/M2 | SYSTOLIC BLOOD PRESSURE: 126 MMHG | TEMPERATURE: 96 F | HEIGHT: 71 IN | DIASTOLIC BLOOD PRESSURE: 87 MMHG | OXYGEN SATURATION: 96 % | WEIGHT: 223 LBS

## 2019-10-21 DIAGNOSIS — E11.49 TYPE II OR UNSPECIFIED TYPE DIABETES MELLITUS WITH NEUROLOGICAL MANIFESTATIONS, UNCONTROLLED(250.62) (HCC): Primary | ICD-10-CM

## 2019-10-21 LAB — GLUCOSE POC: 202 MG/DL

## 2019-10-21 RX ORDER — METFORMIN HYDROCHLORIDE 1000 MG/1
TABLET ORAL
Qty: 180 TAB | Refills: 3 | Status: SHIPPED | OUTPATIENT
Start: 2019-10-21 | End: 2020-08-25 | Stop reason: SDUPTHER

## 2019-10-21 RX ORDER — GLIPIZIDE 10 MG/1
TABLET ORAL
Qty: 180 TAB | Refills: 3 | Status: SHIPPED | OUTPATIENT
Start: 2019-10-21 | End: 2020-08-25 | Stop reason: SDUPTHER

## 2019-10-21 RX ORDER — ATORVASTATIN CALCIUM 40 MG/1
TABLET, FILM COATED ORAL
Qty: 90 TAB | Refills: 3 | Status: SHIPPED | OUTPATIENT
Start: 2019-10-21 | End: 2021-01-21

## 2019-10-21 NOTE — PATIENT INSTRUCTIONS
[employer] Activation    Thank you for requesting access to [employer]. Please follow the instructions below to securely access and download your online medical record. [employer] allows you to send messages to your doctor, view your test results, renew your prescriptions, schedule appointments, and more. How Do I Sign Up? 1. In your internet browser, go to [email or web address]  2. Click on the First Time User? Click Here link in the Sign In box. You will be redirect to the New Member Sign Up page. 3. Enter your [employer] Access Code exactly as it appears below. You will not need to use this code after youve completed the sign-up process. If you do not sign up before the expiration date, you must request a new code. [employer] Access Code: [email or web address]  Expires: 11/3/2019  9:50 AM (This is the date your [employer] access code will )    4. Enter the last four digits of your Social Security Number (xxxx) and Date of Birth (mm/dd/yyyy) as indicated and click Submit. You will be taken to the next sign-up page. 5. Create a [employer] ID. This will be your [employer] login ID and cannot be changed, so think of one that is secure and easy to remember. 6. Create a [employer] password. You can change your password at any time. 7. Enter your Password Reset Question and Answer. This can be used at a later time if you forget your password. 8. Enter your e-mail address. You will receive e-mail notification when new information is available in [address] 9. Click Sign Up. You can now view and download portions of your medical record. 10. Click the Download Summary menu link to download a portable copy of your medical information. Additional Information    If you have questions, please visit the Frequently Asked Questions section of the [employer] website at [email or web address] Remember, [employer] is NOT to be used for urgent needs. For medical emergencies, dial 911.

## 2019-10-21 NOTE — PROGRESS NOTES
Austin Morton is a 76 y.o. male and presents with Diabetes (patient not taking new medication dr. barcenas)  . Subjective:  Hypertension Review:  The patient has hypertension . Diet and Lifestyle: generally follows a low sodium diet, exercises sporadically  Home BP Monitoring: is not measured at home. Pertinent ROS: taking medications as instructed, no medication side effects noted, no TIA's, no chest pain on exertion, no dyspnea on exertion, no swelling of ankles. Dyslipidemia Review:  Patient presents for evaluation of lipids. Compliance with treatment thus far has been excellent. A repeat fasting lipid profile was done. The patient does not use medications that may worsen dyslipidemias . The patient exercises sporadically. Diabetes Mellitus Review:  He has diabetes mellitus. Diabetic ROS - medication compliance: compliant all of the time, diabetic diet compliance: compliant all of the time, home glucose monitoring: is performed. Known diabetic complications: none  Cardiovascular risk factors: family history, dyslipidemia, diabetes mellitus, obesity, hypertension  Current diabetic medications include oral agents/insulin   Eye exam current (within one year): no  Weight trend: stable  Prior visit with dietician: no  Current diet: \"healthy\" diet  in general  Current exercise: walking  Current monitoring regimen: home blood tests - daily  Home blood sugar records: trend: stable  Any episodes of hypoglycemia? no  Is He on ACE inhibitor or angiotensin II receptor blocker? Yes   Lab review: orders written for new lab studies as appropriate; see orders. Review of Systems  Constitutional: negative for fevers, chills, anorexia and weight loss  Eyes:   negative for visual disturbance and irritation  ENT:   negative for tinnitus,sore throat,nasal congestion,ear pains. hoarseness  Respiratory:  negative for cough, hemoptysis, dyspnea,wheezing  CV:   negative for chest pain, palpitations, lower extremity edema  GI:   negative for nausea, vomiting, diarrhea, abdominal pain,melena  Endo:               negative for polyuria,polydipsia,polyphagia,heat intolerance  Genitourinary: negative for frequency, dysuria and hematuria  Integument:  negative for rash and pruritus  Hematologic:  negative for easy bruising and gum/nose bleeding  Musculoskel: negative for myalgias, arthralgias, back pain, muscle weakness, joint pain  Neurological:  negative for headaches, dizziness, vertigo, memory problems and gait   Behavl/Psych: negative for feelings of anxiety, depression, mood changes    Past Medical History:   Diagnosis Date    Diabetes ([facility] [address]      Past Surgical History:   Procedure Laterality Date    HX ORTHOPAEDIC  2012    lumbar    HX PROSTATECTOMY  2012    HX UROLOGICAL  2012     Social History     Socioeconomic History    Marital status: SINGLE     Spouse name: Not on file    Number of children: 2    Years of education: Not on file    Highest education level: Not on file   Tobacco Use    Smoking status: Never Smoker    Smokeless tobacco: Never Used   Substance and Sexual Activity    Alcohol use: No    Drug use: No   Other Topics Concern     Service No    Blood Transfusions No    Caffeine Concern No    Occupational Exposure No    Hobby Hazards No    Sleep Concern No    Stress Concern No    Weight Concern No    Special Diet No    Back Care No    Exercise Yes    Bike Helmet No    Seat Belt No    Self-Exams No     No family history on file. Current Outpatient Medications   Medication Sig Dispense Refill    glipiZIDE (GLUCOTROL) 10 mg tablet TAKE 1 TABLET BY MOUTH TWICE DAILY 180 Tab 3    metFORMIN (GLUCOPHAGE) 1,000 mg tablet TAKE 1 TABLET BY MOUTH TWICE DAILY WITH  A  MEAL 180 Tab 3    atorvastatin (LIPITOR) 40 mg tablet TAKE 1 TABLET BY MOUTH ONCE DAILY 90 Tab 3    SITagliptin (JANUVIA) 100 mg tablet Take 1 Tab by mouth daily.  30 Tab 12    albuterol (PROVENTIL HFA, VENTOLIN HFA, PROAIR HFA) 90 mcg/actuation inhaler Take 2 Puffs by inhalation every four (4) hours as needed for Wheezing. 1 Inhaler 12    fluticasone-salmeterol (ADVAIR) 100-50 mcg/dose diskus inhaler Take 1 Puff by inhalation two (2) times a day. 1 Inhaler 12     No Known Allergies    Objective:  Visit Vitals  /87   Pulse 95   Temp 96 °F (35.6 °C) (Oral)   Resp 16   Ht 5' 11\" (1.803 m)   Wt 223 lb (101.2 kg)   SpO2 96%   BMI 31.10 kg/m²     Physical Exam:   General appearance - alert, well appearing, and in no distress  Mental status - alert, oriented to person, place, and time  EYE-DAVIAN, EOMI, corneas normal, no foreign bodies  ENT-ENT exam normal, no neck nodes or sinus tenderness  Nose - normal and patent, no erythema, discharge or polyps  Mouth - mucous membranes moist, pharynx normal without lesions  Neck - supple, no significant adenopathy   Chest - clear to auscultation, no wheezes, rales or rhonchi, symmetric air entry   Heart - normal rate, regular rhythm, normal S1, S2, no murmurs, rubs, clicks or gallops   Abdomen - soft, nontender, nondistended, no masses or organomegaly  Lymph- no adenopathy palpable  Ext-peripheral pulses normal, no pedal edema, no clubbing or cyanosis  Skin-Warm and dry. no hyperpigmentation, vitiligo, or suspicious lesions  Neuro -alert, oriented, normal speech, no focal findings or movement disorder noted  Neck-normal C-spine, no tenderness, full ROM without pain  Feet-no nail deformities or callus formation with good pulses noted  Lt.wrist-quarter size soft tissue mass    Results for orders placed or performed in visit on 10/21/19   AMB POC GLUCOSE BLOOD, BY GLUCOSE MONITORING DEVICE   Result Value Ref Range    Glucose  mg/dL       Assessment/Plan:    ICD-10-CM ICD-9-CM    1.  Type II or unspecified type diabetes mellitus with neurological manifestations, uncontrolled(250.62) ([facility]) E11.49 250.62 AMB POC GLUCOSE BLOOD, BY GLUCOSE MONITORING DEVICE     Orders Placed This Encounter    AMB POC GLUCOSE BLOOD, BY GLUCOSE MONITORING DEVICE    glipiZIDE (GLUCOTROL) 10 mg tablet     Sig: TAKE 1 TABLET BY MOUTH TWICE DAILY     Dispense:  180 Tab     Refill:  3     Please consider 90 day supplies to promote better adherence    metFORMIN (GLUCOPHAGE) 1,000 mg tablet     Sig: TAKE 1 TABLET BY MOUTH TWICE DAILY WITH  A  MEAL     Dispense:  180 Tab     Refill:  3    atorvastatin (LIPITOR) 40 mg tablet     Sig: TAKE 1 TABLET BY MOUTH ONCE DAILY     Dispense:  90 Tab     Refill:  3     lose weight, increase physical activity, follow low fat diet, follow low salt diet  Patient Instructions   MyChart Activation    Thank you for requesting access to [employer] Please follow the instructions below to securely access and download your online medical record. [employer] allows you to send messages to your doctor, view your test results, renew your prescriptions, schedule appointments, and more. How Do I Sign Up? 1. In your internet browser, go to [email or web address]  2. Click on the First Time User? Click Here link in the Sign In box. You will be redirect to the New Member Sign Up page. 3. Enter your [employer] Access Code exactly as it appears below. You will not need to use this code after youve completed the sign-up process. If you do not sign up before the expiration date, you must request a new code. [employer] Access Code: [email or web address]  Expires: 11/3/2019  9:50 AM (This is the date your [employer] access code will )    4. Enter the last four digits of your Social Security Number (xxxx) and Date of Birth (mm/dd/yyyy) as indicated and click Submit. You will be taken to the next sign-up page. 5. Create a [employer] ID. This will be your [employer] login ID and cannot be changed, so think of one that is secure and easy to remember. 6. Create a [employer] password. You can change your password at any time. 7. Enter your Password Reset Question and Answer.  This can be used at a later time if you forget your password. 8. Enter your e-mail address. You will receive e-mail notification when new information is available in [address] 9. Click Sign Up. You can now view and download portions of your medical record. 10. Click the Download Summary menu link to download a portable copy of your medical information. Additional Information    If you have questions, please visit the Frequently Asked Questions section of the [employer] website at [email or web address] Remember, [employer] is NOT to be used for urgent needs. For medical emergencies, dial 911. Follow-up and Dispositions    · Return in about 4 weeks (around 11/18/2019), or if symptoms worsen or fail to improve. I have reviewed with the patient details of the assessment and plan and all questions were answered. Relevent patient education was performed    An After Visit Summary was printed and given to the patient.

## 2019-10-21 NOTE — PROGRESS NOTES
1. Have you been to the ER, urgent care clinic since your last visit? Hospitalized since your last visit?no    2. Have you seen or consulted any other health care providers outside of the [address] [place] [address] since your last visit? Include any pap smears or colon screening. No    3 most recent PHQ Screens 9/19/2019   PHQ Not Done -   Little interest or pleasure in doing things Not at all   Feeling down, depressed, irritable, or hopeless Not at all   Total Score PHQ 2 0     Chief Complaint   Patient presents with    Diabetes     Per Dr. Maged Beckford.,  verbal order given for needed amb poc labs.

## 2019-11-18 ENCOUNTER — OFFICE VISIT (OUTPATIENT)
Dept: INTERNAL MEDICINE CLINIC | Age: 69
End: 2019-11-18

## 2019-11-18 DIAGNOSIS — E11.49 TYPE II OR UNSPECIFIED TYPE DIABETES MELLITUS WITH NEUROLOGICAL MANIFESTATIONS, UNCONTROLLED(250.62) (HCC): Primary | ICD-10-CM

## 2019-11-18 DIAGNOSIS — J01.01 ACUTE RECURRENT MAXILLARY SINUSITIS: ICD-10-CM

## 2019-11-18 LAB
GLUCOSE POC: 160 MG/DL
HBA1C MFR BLD HPLC: 8.5 %

## 2019-11-18 RX ORDER — LEVOFLOXACIN 500 MG/1
500 TABLET, FILM COATED ORAL DAILY
Qty: 7 TAB | Refills: 0 | Status: SHIPPED | OUTPATIENT
Start: 2019-11-18 | End: 2020-01-23 | Stop reason: ALTCHOICE

## 2019-11-18 NOTE — PROGRESS NOTES
Suresh Juan is a 76 y.o. male and presents with Diabetes  . Subjective:    He has a recent nasal drip reported. Hypertension Review:  The patient has hypertension . Diet and Lifestyle: generally follows a low sodium diet, exercises sporadically  Home BP Monitoring: is not measured at home. Pertinent ROS: taking medications as instructed, no medication side effects noted, no TIA's, no chest pain on exertion, no dyspnea on exertion, no swelling of ankles. Dyslipidemia Review:  Patient presents for evaluation of lipids. Compliance with treatment thus far has been excellent. A repeat fasting lipid profile was done. The patient does not use medications that may worsen dyslipidemias . The patient exercises sporadically. Diabetes Mellitus Review:  He has diabetes mellitus. Diabetic ROS - medication compliance: compliant all of the time, diabetic diet compliance: compliant all of the time, home glucose monitoring: is performed. Known diabetic complications: none  Cardiovascular risk factors: family history, dyslipidemia, diabetes mellitus, obesity, hypertension  Current diabetic medications include oral agents/insulin   Eye exam current (within one year): no  Weight trend: stable  Prior visit with dietician: no  Current diet: \"healthy\" diet  in general  Current exercise: walking  Current monitoring regimen: home blood tests - daily  Home blood sugar records: trend: stable  Any episodes of hypoglycemia? no  Is He on ACE inhibitor or angiotensin II receptor blocker? Yes   Lab review: orders written for new lab studies as appropriate; see orders. Review of Systems  Constitutional: negative for fevers, chills, anorexia and weight loss  Eyes:   negative for visual disturbance and irritation  ENT:   negative for tinnitus,sore throat,nasal congestion,ear pains. hoarseness  Respiratory:  negative for cough, hemoptysis, dyspnea,wheezing  CV:   negative for chest pain, palpitations, lower extremity edema  GI:   negative for nausea, vomiting, diarrhea, abdominal pain,melena  Endo:               negative for polyuria,polydipsia,polyphagia,heat intolerance  Genitourinary: negative for frequency, dysuria and hematuria  Integument:  negative for rash and pruritus  Hematologic:  negative for easy bruising and gum/nose bleeding  Musculoskel: negative for myalgias, arthralgias, back pain, muscle weakness, joint pain  Neurological:  negative for headaches, dizziness, vertigo, memory problems and gait   Behavl/Psych: negative for feelings of anxiety, depression, mood changes    Past Medical History:   Diagnosis Date    Diabetes ([facility] [address]      Past Surgical History:   Procedure Laterality Date    HX ORTHOPAEDIC  2012    lumbar    HX PROSTATECTOMY  2012    HX UROLOGICAL  2012     Social History     Socioeconomic History    Marital status: SINGLE     Spouse name: Not on file    Number of children: 2    Years of education: Not on file    Highest education level: Not on file   Tobacco Use    Smoking status: Never Smoker    Smokeless tobacco: Never Used   Substance and Sexual Activity    Alcohol use: No    Drug use: No   Other Topics Concern     Service No    Blood Transfusions No    Caffeine Concern No    Occupational Exposure No    Hobby Hazards No    Sleep Concern No    Stress Concern No    Weight Concern No    Special Diet No    Back Care No    Exercise Yes    Bike Helmet No    Seat Belt No    Self-Exams No     No family history on file. Current Outpatient Medications   Medication Sig Dispense Refill    glipiZIDE (GLUCOTROL) 10 mg tablet TAKE 1 TABLET BY MOUTH TWICE DAILY 180 Tab 3    metFORMIN (GLUCOPHAGE) 1,000 mg tablet TAKE 1 TABLET BY MOUTH TWICE DAILY WITH  A  MEAL 180 Tab 3    atorvastatin (LIPITOR) 40 mg tablet TAKE 1 TABLET BY MOUTH ONCE DAILY 90 Tab 3    SITagliptin (JANUVIA) 100 mg tablet Take 1 Tab by mouth daily.  30 Tab 12    albuterol (PROVENTIL HFA, VENTOLIN HFA, PROAIR HFA) 90 mcg/actuation inhaler Take 2 Puffs by inhalation every four (4) hours as needed for Wheezing. 1 Inhaler 12    fluticasone-salmeterol (ADVAIR) 100-50 mcg/dose diskus inhaler Take 1 Puff by inhalation two (2) times a day. 1 Inhaler 12     No Known Allergies    Objective: There were no vitals taken for this visit. Physical Exam:   General appearance - alert, well appearing, and in no distress  Mental status - alert, oriented to person, place, and time  EYE-DAVIAN, EOMI, corneas normal, no foreign bodies  ENT-ENT exam normal, no neck nodes or sinus tenderness  Nose - Turbinates boggy and mucoid with erythema and edema noted  Mouth - mucous membranes moist, pharynx normal without lesions  Neck - supple, no significant adenopathy   Chest - clear to auscultation, no wheezes, rales or rhonchi, symmetric air entry   Heart - normal rate, regular rhythm, normal S1, S2, no murmurs, rubs, clicks or gallops   Abdomen - soft, nontender, nondistended, no masses or organomegaly  Lymph- no adenopathy palpable  Ext-peripheral pulses normal, no pedal edema, no clubbing or cyanosis  Skin-Warm and dry. no hyperpigmentation, vitiligo, or suspicious lesions  Neuro -alert, oriented, normal speech, no focal findings or movement disorder noted  Neck-normal C-spine, no tenderness, full ROM without pain  Feet-no nail deformities or callus formation with good pulses noted  Lt.wrist-quarter size soft tissue mass    Results for orders placed or performed in visit on 11/18/19   AMB POC GLUCOSE BLOOD, BY GLUCOSE MONITORING DEVICE   Result Value Ref Range    Glucose  mg/dL   AMB POC HEMOGLOBIN A1C   Result Value Ref Range    Hemoglobin A1c (POC) 8.5 %       Assessment/Plan:    ICD-10-CM ICD-9-CM    1.  Type II or unspecified type diabetes mellitus with neurological manifestations, uncontrolled(250.62) ([facility]) E11.49 250.62 AMB POC GLUCOSE BLOOD, BY GLUCOSE MONITORING DEVICE      AMB POC HEMOGLOBIN A1C     Orders Placed This Encounter  AMB POC GLUCOSE BLOOD, BY GLUCOSE MONITORING DEVICE    AMB POC HEMOGLOBIN A1C     lose weight, increase physical activity, follow low fat diet, follow low salt diet  Patient Instructions   MyChart Activation    Thank you for requesting access to Meritful. Please follow the instructions below to securely access and download your online medical record. Meritful allows you to send messages to your doctor, view your test results, renew your prescriptions, schedule appointments, and more. How Do I Sign Up? 1. In your internet browser, go to [email or web address]  2. Click on the First Time User? Click Here link in the Sign In box. You will be redirect to the New Member Sign Up page. 3. Enter your Meritful Access Code exactly as it appears below. You will not need to use this code after youve completed the sign-up process. If you do not sign up before the expiration date, you must request a new code. Meritful Access Code: [email or web address]  Expires: 2020  8:55 AM (This is the date your Meritful access code will )    4. Enter the last four digits of your Social Security Number (xxxx) and Date of Birth (mm/dd/yyyy) as indicated and click Submit. You will be taken to the next sign-up page. 5. Create a Meritful ID. This will be your Meritful login ID and cannot be changed, so think of one that is secure and easy to remember. 6. Create a Meritful password. You can change your password at any time. 7. Enter your Password Reset Question and Answer. This can be used at a later time if you forget your password. 8. Enter your e-mail address. You will receive e-mail notification when new information is available in [address] 9. Click Sign Up. You can now view and download portions of your medical record. 10. Click the Download Summary menu link to download a portable copy of your medical information.     Additional Information    If you have questions, please visit the Frequently Asked Questions section of the [employer] website at [email or web address] Remember, RemitDATAt is NOT to be used for urgent needs. For medical emergencies, dial 911. Follow-up and Dispositions    · Return in about 3 months (around 2/18/2020), or if symptoms worsen or fail to improve. I have reviewed with the patient details of the assessment and plan and all questions were answered. Relevent patient education was performed    An After Visit Summary was printed and given to the patient.

## 2019-11-18 NOTE — PROGRESS NOTES
1. Have you been to the ER, urgent care clinic since your last visit? Hospitalized since your last visit?no    2. Have you seen or consulted any other health care providers outside of the [address] [place] [address] since your last visit? Include any pap smears or colon screening. No    3 most recent PHQ Screens 9/19/2019   PHQ Not Done -   Little interest or pleasure in doing things Not at all   Feeling down, depressed, irritable, or hopeless Not at all   Total Score PHQ 2 0     Chief Complaint   Patient presents with    Diabetes     Per Dr. Sae Fair.,  verbal order given for needed amb poc labs.

## 2019-11-18 NOTE — PATIENT INSTRUCTIONS
Q-Bot Activation    Thank you for requesting access to Q-Bot. Please follow the instructions below to securely access and download your online medical record. Q-Bot allows you to send messages to your doctor, view your test results, renew your prescriptions, schedule appointments, and more. How Do I Sign Up? 1. In your internet browser, go to [email or web address]  2. Click on the First Time User? Click Here link in the Sign In box. You will be redirect to the New Member Sign Up page. 3. Enter your Q-Bot Access Code exactly as it appears below. You will not need to use this code after youve completed the sign-up process. If you do not sign up before the expiration date, you must request a new code. Q-Bot Access Code: [email or web address]  Expires: 2020  8:55 AM (This is the date your Q-Bot access code will )    4. Enter the last four digits of your Social Security Number (xxxx) and Date of Birth (mm/dd/yyyy) as indicated and click Submit. You will be taken to the next sign-up page. 5. Create a Q-Bot ID. This will be your Q-Bot login ID and cannot be changed, so think of one that is secure and easy to remember. 6. Create a Q-Bot password. You can change your password at any time. 7. Enter your Password Reset Question and Answer. This can be used at a later time if you forget your password. 8. Enter your e-mail address. You will receive e-mail notification when new information is available in [address] 9. Click Sign Up. You can now view and download portions of your medical record. 10. Click the Download Summary menu link to download a portable copy of your medical information. Additional Information    If you have questions, please visit the Frequently Asked Questions section of the Q-Bot website at [email or web address] Remember, Q-Bot is NOT to be used for urgent needs. For medical emergencies, dial 911.

## 2019-12-30 RX ORDER — GLIPIZIDE 5 MG/1
TABLET ORAL
Qty: 180 TAB | Refills: 0 | Status: SHIPPED | OUTPATIENT
Start: 2019-12-30 | End: 2020-01-23 | Stop reason: DRUGHIGH

## 2020-01-23 ENCOUNTER — OFFICE VISIT (OUTPATIENT)
Dept: ENDOCRINOLOGY | Age: 70
End: 2020-01-23

## 2020-01-23 VITALS
HEIGHT: 71 IN | DIASTOLIC BLOOD PRESSURE: 77 MMHG | OXYGEN SATURATION: 98 % | TEMPERATURE: 97.5 F | BODY MASS INDEX: 34.02 KG/M2 | RESPIRATION RATE: 18 BRPM | WEIGHT: 243 LBS | HEART RATE: 91 BPM | SYSTOLIC BLOOD PRESSURE: 131 MMHG

## 2020-01-23 DIAGNOSIS — E11.65 UNCONTROLLED TYPE 2 DIABETES MELLITUS WITH HYPERGLYCEMIA (HCC): Primary | ICD-10-CM

## 2020-01-23 DIAGNOSIS — I10 ESSENTIAL HYPERTENSION: ICD-10-CM

## 2020-01-23 DIAGNOSIS — E78.2 MIXED HYPERLIPIDEMIA: ICD-10-CM

## 2020-01-23 RX ORDER — ASPIRIN 81 MG/1
TABLET ORAL DAILY
COMMUNITY
End: 2021-11-01 | Stop reason: ALTCHOICE

## 2020-01-23 NOTE — LETTER
1/23/20 Patient: Pato Connor YOB: 1950 Date of Visit: 1/23/2020 Marino Mondragon MD 
[address] [address] VIA In Basket Dear Marino Mondragon MD, Thank you for referring Mr. Pato Connor to [address] [place] [address] for evaluation. My notes for this consultation are attached. If you have questions, please do not hesitate to call me. I look forward to following your patient along with you. Sincerely, Tejal Ocampo MD

## 2020-01-23 NOTE — PROGRESS NOTES
Room 3    Identified pt with two pt identifiers(name and ). Reviewed record in preparation for visit and have obtained necessary documentation. All patient medications has been reviewed. Chief Complaint   Patient presents with   [address]    Diabetes     pt is concerned due to recent labs at [facility] (2019).  informed him of sugars. Health Maintenance Due   Topic    EYE EXAM RETINAL OR DILATED     GLAUCOMA SCREENING Q2Y     Pneumococcal 65+ years (1 of 1 - PPSV23)    MICROALBUMIN Q1     FOBT Q 1 YEAR AGE 50-75     FOOT EXAM Q1        Vitals:    20 1335   BP: 131/77   Pulse: 91   Resp: 18   Temp: 97.5 °F (36.4 °C)   TempSrc: Oral   SpO2: 98%   Weight: 243 lb (110.2 kg)   Height: 5' 11\" (1.803 m)   PainSc:   0 - No pain       Wt Readings from Last 3 Encounters:   20 243 lb (110.2 kg)   10/21/19 223 lb (101.2 kg)   19 231 lb 11.2 oz (105.1 kg)     Temp Readings from Last 3 Encounters:   20 97.5 °F (36.4 °C) (Oral)   10/21/19 96 °F (35.6 °C) (Oral)   19 97.9 °F (36.6 °C)     BP Readings from Last 3 Encounters:   20 131/77   10/21/19 126/87   19 126/82     Pulse Readings from Last 3 Encounters:   20 91   10/21/19 95   19 86       Lab Results   Component Value Date/Time    Hemoglobin A1c 6.5 (H) 2017 02:35 PM    Hemoglobin A1c (POC) 8.5 2019 08:39 AM    Hemoglobin A1c, External 6.8 2018       Coordination of Care Questionnaire:   1) Have you been to an emergency room, urgent care, or hospitalized since your last visit?   no       2. Have seen or consulted any other health care provider since your last visit? NO    3) Do you have an Advanced Directive/ Living Will in place? NO  If yes, do we have a copy on file NO  If no, would you like information NO    Patient is accompanied by self I have received verbal consent from Katherine Huertas to discuss any/all medical information while they are present in the room.

## 2020-01-23 NOTE — PATIENT INSTRUCTIONS
Do not skip meals  Do not eat in between meals    Reduce carbs- pasta, rice, potatoes, bread   Do not drink juices or sodas  Donot eat peanut butter     Do not eat sugar free cookies and cakes   Do not eat peaches, grapes, oranges,  Pineapples and fruit medleys     -----------------------------------------------------------------------------------------------------------------      Stay on  Metformin 1000 mg  One pill a day with meals       Start on glipizide 10 mg , twenty minutes before b-fast and dinner         ---------------------------------------------------------------------------------------------------------------

## 2020-01-23 NOTE — PROGRESS NOTES
HISTORY OF PRESENT ILLNESS  Santiago Hampton is a 71 y.o. male. HPI  Patient here for initial visit of Type 2 diabetes mellitus . Referred : by self/pcp    H/o diabetes for few years     Current A1C is 8 % and symptoms/problems include none     Current diabetic medications include metformin and glipizide . Current monitoring regimen: home blood tests - never   Home blood sugar records: trend: unknown  Any episodes of hypoglycemia? no    Weight trend: increasing steadily  Prior visit with dietician: no  Current diet: \"unhealthy\" diet in general  Current exercise: no regular exercise    Known diabetic complications: none  Cardiovascular risk factors: dyslipidemia, diabetes mellitus, sedentary life style, male gender, hypertension    Eye exam current (within one year): no  CAMRON: no     Past Medical History:   Diagnosis Date    Diabetes ([facility] [address]      Past Surgical History:   Procedure Laterality Date    HX ORTHOPAEDIC  2012    lumbar    HX PROSTATECTOMY  2012    HX UROLOGICAL  2012     Current Outpatient Medications   Medication Sig    aspirin delayed-release 81 mg tablet Take  by mouth daily.  Lactobacillus acidophilus (PROBIOTIC PO) Take  by mouth.  glipiZIDE (GLUCOTROL) 10 mg tablet TAKE 1 TABLET BY MOUTH TWICE DAILY    metFORMIN (GLUCOPHAGE) 1,000 mg tablet TAKE 1 TABLET BY MOUTH TWICE DAILY WITH  A  MEAL    atorvastatin (LIPITOR) 40 mg tablet TAKE 1 TABLET BY MOUTH ONCE DAILY    glipiZIDE (GLUCOTROL) 5 mg tablet TAKE 1 TABLET BY MOUTH TWICE DAILY    levoFLOXacin (LEVAQUIN) 500 mg tablet Take 1 Tab by mouth daily.  SITagliptin (JANUVIA) 100 mg tablet Take 1 Tab by mouth daily.  albuterol (PROVENTIL HFA, VENTOLIN HFA, PROAIR HFA) 90 mcg/actuation inhaler Take 2 Puffs by inhalation every four (4) hours as needed for Wheezing.  fluticasone-salmeterol (ADVAIR) 100-50 mcg/dose diskus inhaler Take 1 Puff by inhalation two (2) times a day.      No current facility-administered medications for this visit. Review of Systems   Constitutional: Negative. HENT: Negative. Eyes: Negative. Respiratory: Negative. Cardiovascular: Negative. Gastrointestinal: Negative. Genitourinary: Negative. Musculoskeletal: Negative. Skin: Negative. Neurological: Negative. Endo/Heme/Allergies: Negative. Psychiatric/Behavioral: Negative. Physical Exam  Constitutional:       Appearance: He is well-developed. HENT:      Head: Normocephalic. Eyes:      Conjunctiva/sclera: Conjunctivae normal.      Pupils: Pupils are equal, round, and reactive to light. Neck:      Musculoskeletal: Normal range of motion and neck supple. Cardiovascular:      Rate and Rhythm: Normal rate and regular rhythm. Pulmonary:      Effort: Pulmonary effort is normal.      Breath sounds: Normal breath sounds. Abdominal:      General: Bowel sounds are normal.      Palpations: Abdomen is soft. Musculoskeletal: Normal range of motion. Skin:     General: Skin is warm and dry. Neurological:      Mental Status: He is alert and oriented to person, place, and time. Deep Tendon Reflexes: Reflexes are normal and symmetric. Diabetic foot exam:     Left Foot:   Visual Exam: callous - present has bunion   Pulse DP: 2+ (normal)   Filament test: normal sensation    Vibratory sensation: normal      Right Foot:   Visual Exam: callous - present and recently got shaved by podiatrist  , has bunion   Pulse DP: 2+ (normal)   Filament test: normal sensation    Vibratory sensation: normal        ASSESSMENT and PLAN    1. Type 2 DM, uncontrolled :  a1c is  8 %  From nov 2019     Discussed DM 2 patho-physiology extensively      Reviewed the glucose log : no( he does not like to check sugars )  Continuing on metformin and glipzide     Patient is advised about checking blood sugars 1-2 times a day and maintaining log book.  The danger of having low blood sugars has been explained with inappropriate use of insulin  Patient voiced understanding and using the printed instructions at home. Hypoglycemia management has been explained to the patient. Carbohydrate counting discussed with the patient     lab results and schedule of future lab studies reviewed with patient  specific diabetic recommendations: diabetic diet discussed in detail, written exchange diet given, low cholesterol diet, weight control and daily exercise discussed, home glucose monitoring emphasized, glucose meter dispensed to patient and all medications, side effects and compliance discussed carefully    2. Hypoglycemia :  Educated on treating the hypoglycemia. Discussed Glipizide  use and prescribed    3. HTN : continue current care. Patient is educated about importance of compliance with anti-hypertensives especially ARB/ACEI    4. Dyslipidemia : continue current meds. Patient is educated about benefits and adverse effects of statins and explained how benefits outweigh risk.     5. use of aspirin to prevent MI and TIA's discussed      > 50 % of time is spent on counseling   Patient voiced understanding her plan of care

## 2020-02-19 ENCOUNTER — OFFICE VISIT (OUTPATIENT)
Dept: INTERNAL MEDICINE CLINIC | Age: 70
End: 2020-02-19

## 2020-02-19 VITALS
SYSTOLIC BLOOD PRESSURE: 136 MMHG | HEART RATE: 74 BPM | TEMPERATURE: 98 F | BODY MASS INDEX: 34.3 KG/M2 | DIASTOLIC BLOOD PRESSURE: 84 MMHG | HEIGHT: 71 IN | OXYGEN SATURATION: 96 % | WEIGHT: 245 LBS | RESPIRATION RATE: 16 BRPM

## 2020-02-19 DIAGNOSIS — I10 ESSENTIAL HYPERTENSION: ICD-10-CM

## 2020-02-19 DIAGNOSIS — E11.49 TYPE II OR UNSPECIFIED TYPE DIABETES MELLITUS WITH NEUROLOGICAL MANIFESTATIONS, UNCONTROLLED(250.62) (HCC): Primary | ICD-10-CM

## 2020-02-19 DIAGNOSIS — E78.2 MIXED HYPERLIPIDEMIA: ICD-10-CM

## 2020-02-19 LAB
CHOLEST SERPL-MCNC: 169 MG/DL
GLUCOSE POC: 212 MG/DL
HBA1C MFR BLD HPLC: 10 %
HDLC SERPL-MCNC: 72 MG/DL
LDL CHOLESTEROL POC: 88 MG/DL
NON-HDL CHOLESTEROL, 011976: 97
TCHOL/HDL RATIO (POC): 2.4
TRIGL SERPL-MCNC: 47 MG/DL

## 2020-02-19 NOTE — PATIENT INSTRUCTIONS
[email or web address] Activation    Thank you for requesting access to [email or web address]. Please follow the instructions below to securely access and download your online medical record. [email or web address] allows you to send messages to your doctor, view your test results, renew your prescriptions, schedule appointments, and more. How Do I Sign Up? 1. In your internet browser, go to [email or web address]  2. Click on the First Time User? Click Here link in the Sign In box. You will be redirect to the New Member Sign Up page. 3. Enter your TheDressSpot.com Access Code exactly as it appears below. You will not need to use this code after youve completed the sign-up process. If you do not sign up before the expiration date, you must request a new code. TheDressSpot.com Access Code: [email or web address]  Expires: 3/8/2020  2:20 PM (This is the date your TheDressSpot.com access code will )    4. Enter the last four digits of your Social Security Number (xxxx) and Date of Birth (mm/dd/yyyy) as indicated and click Submit. You will be taken to the next sign-up page. 5. Create a [email or web address] ID. This will be your [email or web address] login ID and cannot be changed, so think of one that is secure and easy to remember. 6. Create a TheDressSpot.com password. You can change your password at any time. 7. Enter your Password Reset Question and Answer. This can be used at a later time if you forget your password. 8. Enter your e-mail address. You will receive e-mail notification when new information is available in [address] 9. Click Sign Up. You can now view and download portions of your medical record. 10. Click the Download Summary menu link to download a portable copy of your medical information. Additional Information    If you have questions, please visit the Frequently Asked Questions section of the [email or web address] website at [email or web address] Remember, [email or web address] is NOT to be used for urgent needs. For medical emergencies, dial 911.

## 2020-02-19 NOTE — PROGRESS NOTES
1. Have you been to the ER, urgent care clinic since your last visit? Hospitalized since your last visit?no    2. Have you seen or consulted any other health care providers outside of the [address] [place] [address] since your last visit? Include any pap smears or colon screening. No    3 most recent PHQ Screens 1/23/2020   PHQ Not Done -   Little interest or pleasure in doing things Not at all   Feeling down, depressed, irritable, or hopeless Not at all   Total Score PHQ 2 0     Chief Complaint   Patient presents with    Diabetes    Cholesterol Problem    Hypertension     Per Dr. Pete Dobbs.,  verbal order given for needed amb poc labs.

## 2020-02-19 NOTE — PROGRESS NOTES
Jessica Villanueva is a 71 y.o. male and presents with Diabetes; Cholesterol Problem; and Hypertension  . Subjective:  Hypertension Review:  The patient has hypertension . Diet and Lifestyle: generally follows a low sodium diet, exercises sporadically  Home BP Monitoring: is not measured at home. Pertinent ROS: taking medications as instructed, no medication side effects noted, no TIA's, no chest pain on exertion, no dyspnea on exertion, no swelling of ankles. Dyslipidemia Review:  Patient presents for evaluation of lipids. Compliance with treatment thus far has been excellent. A repeat fasting lipid profile was done. The patient does not use medications that may worsen dyslipidemias . The patient exercises sporadically. Diabetes Mellitus Review:  He has diabetes mellitus. Diabetic ROS - medication compliance: compliant all of the time, diabetic diet compliance: compliant all of the time, home glucose monitoring: is performed. Known diabetic complications: none  Cardiovascular risk factors: family history, dyslipidemia, diabetes mellitus, obesity, hypertension  Current diabetic medications include oral agents  Eye exam current (within one year): no  Weight trend: stable  Prior visit with dietician: no  Current diet: \"healthy\" diet  in general  Current exercise: walking  Current monitoring regimen: home blood tests - daily  Home blood sugar records: trend: stable  Any episodes of hypoglycemia? no  Is He on ACE inhibitor or angiotensin II receptor blocker? Yes           Review of Systems  Constitutional: negative for fevers, chills, anorexia and weight loss  Eyes:   negative for visual disturbance and irritation  ENT:   negative for tinnitus,sore throat,nasal congestion,ear pains. hoarseness  Respiratory:  negative for cough, hemoptysis, dyspnea,wheezing  CV:   negative for chest pain, palpitations, lower extremity edema  GI:   negative for nausea, vomiting, diarrhea, abdominal pain,melena  Endo: negative for polyuria,polydipsia,polyphagia,heat intolerance  Genitourinary: negative for frequency, dysuria and hematuria  Integument:  negative for rash and pruritus  Hematologic:  negative for easy bruising and gum/nose bleeding  Musculoskel: negative for myalgias, arthralgias, back pain, muscle weakness, joint pain  Neurological:  negative for headaches, dizziness, vertigo, memory problems and gait   Behavl/Psych: negative for feelings of anxiety, depression, mood changes    Past Medical History:   Diagnosis Date    Diabetes ([address]      Past Surgical History:   Procedure Laterality Date    HX ORTHOPAEDIC  2012    lumbar    HX PROSTATECTOMY  2012    HX UROLOGICAL  2012     Social History     Socioeconomic History    Marital status: SINGLE     Spouse name: Not on file    Number of children: 2    Years of education: Not on file    Highest education level: Not on file   Tobacco Use    Smoking status: Never Smoker    Smokeless tobacco: Never Used   Substance and Sexual Activity    Alcohol use: No    Drug use: No   Other Topics Concern     Service No    Blood Transfusions No    Caffeine Concern No    Occupational Exposure No    Hobby Hazards No    Sleep Concern No    Stress Concern No    Weight Concern No    Special Diet No    Back Care No    Exercise Yes    Bike Helmet No    Seat Belt No    Self-Exams No     History reviewed. No pertinent family history. Current Outpatient Medications   Medication Sig Dispense Refill    SITagliptin (JANUVIA) 100 mg tablet Take 1 Tab by mouth daily. 30 Tab 12    aspirin delayed-release 81 mg tablet Take  by mouth daily.  Lactobacillus acidophilus (PROBIOTIC PO) Take  by mouth.       glipiZIDE (GLUCOTROL) 10 mg tablet TAKE 1 TABLET BY MOUTH TWICE DAILY 180 Tab 3    metFORMIN (GLUCOPHAGE) 1,000 mg tablet TAKE 1 TABLET BY MOUTH TWICE DAILY WITH  A  MEAL 180 Tab 3    atorvastatin (LIPITOR) 40 mg tablet TAKE 1 TABLET BY MOUTH ONCE DAILY 90 Tab 3     No Known Allergies    Objective:  Visit Vitals  /84   Pulse 74   Temp 98 °F (36.7 °C) (Oral)   Resp 16   Ht 5' 11\" (1.803 m)   Wt 245 lb (111.1 kg)   SpO2 96%   BMI 34.17 kg/m²     Physical Exam:   General appearance - alert, well appearing, and in no distress  Mental status - alert, oriented to person, place, and time  EYE-DAVIAN, EOMI, corneas normal, no foreign bodies  ENT-ENT exam normal, no neck nodes or sinus tenderness  Nose - normal and patent, no erythema, discharge or polyps  Mouth - mucous membranes moist, pharynx normal without lesions  Neck - supple, no significant adenopathy   Chest - clear to auscultation, no wheezes, rales or rhonchi, symmetric air entry   Heart - normal rate, regular rhythm, normal S1, S2, no murmurs, rubs, clicks or gallops   Abdomen - soft, nontender, nondistended, no masses or organomegaly  Lymph- no adenopathy palpable  Ext-peripheral pulses normal, no pedal edema, no clubbing or cyanosis  Skin-Warm and dry. no hyperpigmentation, vitiligo, or suspicious lesions  Neuro -alert, oriented, normal speech, no focal findings or movement disorder noted  Neck-normal C-spine, no tenderness, full ROM without pain  Feet-no nail deformities or callus formation with good pulses noted      Results for orders placed or performed in visit on 02/19/20   AMB POC LIPID PROFILE   Result Value Ref Range    Cholesterol (POC) 169     Triglycerides (POC) 47     HDL Cholesterol (POC) 72     Non-HDL Cholesterol 97     LDL Cholesterol (POC) 88 MG/DL    TChol/HDL Ratio (POC) 2.4    AMB POC GLUCOSE BLOOD, BY GLUCOSE MONITORING DEVICE   Result Value Ref Range    Glucose  mg/dL   AMB POC HEMOGLOBIN A1C   Result Value Ref Range    Hemoglobin A1c (POC) 10 %       Assessment/Plan:    ICD-10-CM ICD-9-CM    1.  Type II or unspecified type diabetes mellitus with neurological manifestations, uncontrolled(250.62) ([facility]) E11.49 250.62 OCCULT BLOOD IMMUNOASSAY,DIAGNOSTIC      AMB POC LIPID PROFILE AMB POC GLUCOSE BLOOD, BY GLUCOSE MONITORING DEVICE      AMB POC HEMOGLOBIN A1C   2. Mixed hyperlipidemia E78.2 272.2 OCCULT BLOOD IMMUNOASSAY,DIAGNOSTIC      AMB POC LIPID PROFILE      AMB POC GLUCOSE BLOOD, BY GLUCOSE MONITORING DEVICE      AMB POC HEMOGLOBIN A1C   3. Essential hypertension I10 401.9 OCCULT BLOOD IMMUNOASSAY,DIAGNOSTIC      AMB POC LIPID PROFILE      AMB POC GLUCOSE BLOOD, BY GLUCOSE MONITORING DEVICE      AMB POC HEMOGLOBIN A1C     Orders Placed This Encounter    OCCULT BLOOD IMMUNOASSAY,DIAGNOSTIC     Order Specific Question:   QUEST SOURCE     Answer:   Stool [1161]    AMB POC LIPID PROFILE    AMB POC GLUCOSE BLOOD, BY GLUCOSE MONITORING DEVICE    AMB POC HEMOGLOBIN A1C    SITagliptin (JANUVIA) 100 mg tablet     Sig: Take 1 Tab by mouth daily. Dispense:  30 Tab     Refill:  12     lose weight, increase physical activity, routine labs ordered, have labs drawn prior to ROV,Take 81mg aspirin daily  Patient Instructions   [employer] Activation    Thank you for requesting access to [facility]. Please follow the instructions below to securely access and download your online medical record. [facility] allows you to send messages to your doctor, view your test results, renew your prescriptions, schedule appointments, and more. How Do I Sign Up? 1. In your internet browser, go to [email or web address]  2. Click on the First Time User? Click Here link in the Sign In box. You will be redirect to the New Member Sign Up page. 3. Enter your [facility] Access Code exactly as it appears below. You will not need to use this code after youve completed the sign-up process. If you do not sign up before the expiration date, you must request a new code. [facility] Access Code: [email or web address]  Expires: 3/8/2020  2:20 PM (This is the date your [facility] access code will )    4. Enter the last four digits of your Social Security Number (xxxx) and Date of Birth (mm/dd/yyyy) as indicated and click Submit.  You will be taken to the next sign-up page. 5. Create a Issuet ID. This will be your [facility] login ID and cannot be changed, so think of one that is secure and easy to remember. 6. Create a [facility] password. You can change your password at any time. 7. Enter your Password Reset Question and Answer. This can be used at a later time if you forget your password. 8. Enter your e-mail address. You will receive e-mail notification when new information is available in [address] 9. Click Sign Up. You can now view and download portions of your medical record. 10. Click the Download Summary menu link to download a portable copy of your medical information. Additional Information    If you have questions, please visit the Frequently Asked Questions section of the [facility] website at [email or web address] Remember, [facility] is NOT to be used for urgent needs. For medical emergencies, dial 911. Follow-up and Dispositions    · Return in about 4 weeks (around 3/18/2020), or if symptoms worsen or fail to improve. I have reviewed with the patient details of the assessment and plan and all questions were answered. Relevent patient education was performed. The most recent lab findings were reviewed with the patient. An After Visit Summary was printed and given to the patient.

## 2020-04-20 DIAGNOSIS — E78.2 MIXED HYPERLIPIDEMIA: ICD-10-CM

## 2020-04-20 DIAGNOSIS — E11.65 UNCONTROLLED TYPE 2 DIABETES MELLITUS WITH HYPERGLYCEMIA (HCC): Primary | ICD-10-CM

## 2020-08-24 ENCOUNTER — OFFICE VISIT (OUTPATIENT)
Dept: INTERNAL MEDICINE CLINIC | Age: 70
End: 2020-08-24
Payer: MEDICARE

## 2020-08-24 VITALS
OXYGEN SATURATION: 97 % | BODY MASS INDEX: 30.8 KG/M2 | RESPIRATION RATE: 16 BRPM | HEART RATE: 94 BPM | TEMPERATURE: 98.6 F | SYSTOLIC BLOOD PRESSURE: 120 MMHG | DIASTOLIC BLOOD PRESSURE: 80 MMHG | HEIGHT: 71 IN | WEIGHT: 220 LBS

## 2020-08-24 DIAGNOSIS — E78.00 HYPERCHOLESTEREMIA: ICD-10-CM

## 2020-08-24 DIAGNOSIS — J18.9 PNEUMONIA OF RIGHT LUNG DUE TO INFECTIOUS ORGANISM, UNSPECIFIED PART OF LUNG: Primary | ICD-10-CM

## 2020-08-24 DIAGNOSIS — E11.49 TYPE II OR UNSPECIFIED TYPE DIABETES MELLITUS WITH NEUROLOGICAL MANIFESTATIONS, UNCONTROLLED(250.62) (HCC): ICD-10-CM

## 2020-08-24 DIAGNOSIS — J90 PLEURAL EFFUSION: ICD-10-CM

## 2020-08-24 DIAGNOSIS — I10 ESSENTIAL HYPERTENSION: ICD-10-CM

## 2020-08-24 DIAGNOSIS — Z00.00 MEDICARE ANNUAL WELLNESS VISIT, SUBSEQUENT: ICD-10-CM

## 2020-08-24 LAB
ALBUMIN SERPL-MCNC: 3.1 G/DL (ref 3.5–5)
ALBUMIN/GLOB SERPL: 0.6 {RATIO} (ref 1.1–2.2)
ALP SERPL-CCNC: 105 U/L (ref 45–117)
ALT SERPL-CCNC: 39 U/L (ref 12–78)
ANION GAP SERPL CALC-SCNC: 5 MMOL/L (ref 5–15)
AST SERPL-CCNC: 64 U/L (ref 15–37)
BILIRUB SERPL-MCNC: 0.5 MG/DL (ref 0.2–1)
BUN SERPL-MCNC: 6 MG/DL (ref 6–20)
BUN/CREAT SERPL: 6 (ref 12–20)
CALCIUM SERPL-MCNC: 9.2 MG/DL (ref 8.5–10.1)
CHLORIDE SERPL-SCNC: 105 MMOL/L (ref 97–108)
CHOLEST SERPL-MCNC: 128 MG/DL
CO2 SERPL-SCNC: 28 MMOL/L (ref 21–32)
CREAT SERPL-MCNC: 0.93 MG/DL (ref 0.7–1.3)
ERYTHROCYTE [DISTWIDTH] IN BLOOD BY AUTOMATED COUNT: 15.6 % (ref 11.5–14.5)
GLOBULIN SER CALC-MCNC: 5.1 G/DL (ref 2–4)
GLUCOSE POC: 129 MG/DL
GLUCOSE SERPL-MCNC: 127 MG/DL (ref 65–100)
HBA1C MFR BLD HPLC: 8.3 %
HCT VFR BLD AUTO: 32.2 % (ref 36.6–50.3)
HDLC SERPL-MCNC: 44 MG/DL
HGB BLD-MCNC: 9.3 G/DL (ref 12.1–17)
LDL CHOLESTEROL POC: 71 MG/DL
MCH RBC QN AUTO: 29.2 PG (ref 26–34)
MCHC RBC AUTO-ENTMCNC: 28.9 G/DL (ref 30–36.5)
MCV RBC AUTO: 101.3 FL (ref 80–99)
NON-HDL GOAL (POC): 85
NRBC # BLD: 0 K/UL (ref 0–0.01)
NRBC BLD-RTO: 0 PER 100 WBC
PLATELET # BLD AUTO: 277 K/UL (ref 150–400)
PMV BLD AUTO: 10.9 FL (ref 8.9–12.9)
POTASSIUM SERPL-SCNC: 4.6 MMOL/L (ref 3.5–5.1)
PROT SERPL-MCNC: 8.2 G/DL (ref 6.4–8.2)
RBC # BLD AUTO: 3.18 M/UL (ref 4.1–5.7)
SODIUM SERPL-SCNC: 138 MMOL/L (ref 136–145)
TCHOL/HDL RATIO (POC): 2.9
TRIGL SERPL-MCNC: 70 MG/DL
WBC # BLD AUTO: 7.2 K/UL (ref 4.1–11.1)

## 2020-08-24 PROCEDURE — 80061 LIPID PANEL: CPT | Performed by: INTERNAL MEDICINE

## 2020-08-24 PROCEDURE — G8510 SCR DEP NEG, NO PLAN REQD: HCPCS | Performed by: INTERNAL MEDICINE

## 2020-08-24 PROCEDURE — 3052F HG A1C>EQUAL 8.0%<EQUAL 9.0%: CPT | Performed by: INTERNAL MEDICINE

## 2020-08-24 PROCEDURE — G8752 SYS BP LESS 140: HCPCS | Performed by: INTERNAL MEDICINE

## 2020-08-24 PROCEDURE — 1101F PT FALLS ASSESS-DOCD LE1/YR: CPT | Performed by: INTERNAL MEDICINE

## 2020-08-24 PROCEDURE — G8427 DOCREV CUR MEDS BY ELIG CLIN: HCPCS | Performed by: INTERNAL MEDICINE

## 2020-08-24 PROCEDURE — 3017F COLORECTAL CA SCREEN DOC REV: CPT | Performed by: INTERNAL MEDICINE

## 2020-08-24 PROCEDURE — G8754 DIAS BP LESS 90: HCPCS | Performed by: INTERNAL MEDICINE

## 2020-08-24 PROCEDURE — 2022F DILAT RTA XM EVC RTNOPTHY: CPT | Performed by: INTERNAL MEDICINE

## 2020-08-24 PROCEDURE — 99214 OFFICE O/P EST MOD 30 MIN: CPT | Performed by: INTERNAL MEDICINE

## 2020-08-24 PROCEDURE — 82962 GLUCOSE BLOOD TEST: CPT | Performed by: INTERNAL MEDICINE

## 2020-08-24 PROCEDURE — G8417 CALC BMI ABV UP PARAM F/U: HCPCS | Performed by: INTERNAL MEDICINE

## 2020-08-24 PROCEDURE — 1111F DSCHRG MED/CURRENT MED MERGE: CPT | Performed by: INTERNAL MEDICINE

## 2020-08-24 PROCEDURE — G8536 NO DOC ELDER MAL SCRN: HCPCS | Performed by: INTERNAL MEDICINE

## 2020-08-24 PROCEDURE — 83036 HEMOGLOBIN GLYCOSYLATED A1C: CPT | Performed by: INTERNAL MEDICINE

## 2020-08-24 PROCEDURE — G0439 PPPS, SUBSEQ VISIT: HCPCS | Performed by: INTERNAL MEDICINE

## 2020-08-24 RX ORDER — CEFDINIR 300 MG/1
CAPSULE ORAL
COMMUNITY
Start: 2020-08-19 | End: 2020-08-24 | Stop reason: SDUPTHER

## 2020-08-24 RX ORDER — CEFDINIR 300 MG/1
300 CAPSULE ORAL 2 TIMES DAILY
Qty: 14 CAP | Refills: 0 | Status: ON HOLD | OUTPATIENT
Start: 2020-08-24 | End: 2021-05-11

## 2020-08-24 NOTE — PROGRESS NOTES
Chief Complaint   Patient presents with   [facility] Follow Up    Diabetes    Cholesterol Problem       3 most recent PHQ Screens 8/24/2020   PHQ Not Done Patient Decline   Little interest or pleasure in doing things Not at all   Feeling down, depressed, irritable, or hopeless Not at all   Total Score PHQ 2 0     1. Have you been to the ER, urgent care clinic since your last visit? Hospitalized since your last visit? Yes, [facility]  2. Have you seen or consulted any other health care providers outside of the [address] [place] [address] since your last visit? Include any pap smears or colon screening.  Yes, [facility]

## 2020-08-24 NOTE — PATIENT INSTRUCTIONS
[facility] Activation    Thank you for requesting access to [facility]. Please follow the instructions below to securely access and download your online medical record. [facility] allows you to send messages to your doctor, view your test results, renew your prescriptions, schedule appointments, and more. How Do I Sign Up? 1. In your internet browser, go to [email or web address]  2. Click on the First Time User? Click Here link in the Sign In box. You will be redirect to the New Member Sign Up page. 3. Enter your [facility] Access Code exactly as it appears below. You will not need to use this code after youve completed the sign-up process. If you do not sign up before the expiration date, you must request a new code. [facility] Access Code: [email or web address]  Expires: 10/8/2020  8:05 AM (This is the date your [facility] access code will )    4. Enter the last four digits of your Social Security Number (xxxx) and Date of Birth (mm/dd/yyyy) as indicated and click Submit. You will be taken to the next sign-up page. 5. Create a [facility] ID. This will be your [facility] login ID and cannot be changed, so think of one that is secure and easy to remember. 6. Create a [facility] password. You can change your password at any time. 7. Enter your Password Reset Question and Answer. This can be used at a later time if you forget your password. 8. Enter your e-mail address. You will receive e-mail notification when new information is available in [address] 9. Click Sign Up. You can now view and download portions of your medical record. 10. Click the Download Summary menu link to download a portable copy of your medical information. Additional Information    If you have questions, please visit the Frequently Asked Questions section of the [facility] website at [email or web address] Remember, [facility] is NOT to be used for urgent needs. For medical emergencies, dial 911.

## 2020-08-24 NOTE — PROGRESS NOTES
Armin Ramos is a 71 y.o. male and presents with Hospital Follow Up; Diabetes; and Cholesterol Problem  . Subjective:    Pneumonia Review:  Armin Ramos is a 71 y.o. male who complains of no nasal congestion,sore throat,fever,chills productive cough, myalgias  for the past few days,. A most recent chest x-ray revealed a pneumonia with effusion,this is a follow up review,  He has less of shortness of breath today. Evaluation to date:he was placed in hospital and noted to have a rt.pleural effusion. Treatment to date: decongestants, antihistamines, cough suppressants, OTC products. Patient does not smoke cigarettes. Dyslipidemia Review:  Patient presents for evaluation of lipids. Compliance with treatment thus far has been excellent. A repeat fasting lipid profile was not done. The patient does not use medications that may worsen dyslipidemias (corticosteroids, progestins, anabolic steroids, amiodarone, cyclosporine, olanzapine). The patient exercises some      Diabetes Mellitus Review:  He has diabetes mellitus. Diabetic ROS - medication compliance: compliant all of the time, diabetic diet compliance: compliant all of the time, home glucose monitoring: is performed. Known diabetic complications: none  Cardiovascular risk factors: family history, dyslipidemia, diabetes mellitus, obesity, hypertension  Current diabetic medications include oral agents   Eye exam current (within one year): no  Weight trend: stable  Prior visit with dietician: no  Current diet: \"healthy\" diet  in general  Current exercise: walking  Current monitoring regimen: home blood tests - daily  Home blood sugar records: trend: stable  Any episodes of hypoglycemia? no  Is He on ACE inhibitor or angiotensin II receptor blocker? Yes         Armin Ramos is a 71 y.o. male and presents for annual Medicare Wellness Visit. Problem List: Reviewed with patient and discussed risk factors.     There is no problem list on file for this patient. Current medical providers:  Patient Care Team:  Kyle Steven MD as PCP - General (Internal Medicine)  Kyle Steven MD as PCP - REHABILITATION [place] [phone] Jessica Vasquez: Reviewed with patient  Past Surgical History:   Procedure Laterality Date    HX ORTHOPAEDIC  2012    lumbar    HX PROSTATECTOMY  2012    HX UROLOGICAL  2012        SH: Reviewed with patient  Social History     Tobacco Use    Smoking status: Never Smoker    Smokeless tobacco: Never Used   Substance Use Topics    Alcohol use: No    Drug use: No       FH: Reviewed with patient  History reviewed. No pertinent family history. Medications/Allergies: Reviewed with patient  Current Outpatient Medications on File Prior to Visit   Medication Sig Dispense Refill    SITagliptin (JANUVIA) 100 mg tablet Take 1 Tab by mouth daily. 30 Tab 12    aspirin delayed-release 81 mg tablet Take  by mouth daily.  Lactobacillus acidophilus (PROBIOTIC PO) Take  by mouth.  glipiZIDE (GLUCOTROL) 10 mg tablet TAKE 1 TABLET BY MOUTH TWICE DAILY 180 Tab 3    metFORMIN (GLUCOPHAGE) 1,000 mg tablet TAKE 1 TABLET BY MOUTH TWICE DAILY WITH  A  MEAL 180 Tab 3    atorvastatin (LIPITOR) 40 mg tablet TAKE 1 TABLET BY MOUTH ONCE DAILY 90 Tab 3     No current facility-administered medications on file prior to visit. No Known Allergies    Objective:  Visit Vitals  /80 (BP 1 Location: Right arm, BP Patient Position: Sitting)   Pulse 94   Temp 98.6 °F (37 °C) (Oral)   Resp 16   Ht 5' 11\" (1.803 m)   Wt 220 lb (99.8 kg)   SpO2 97%   BMI 30.68 kg/m²    Body mass index is 30.68 kg/m².     Assessment of cognitive impairment: Alert and oriented x 3    Depression Screen:   3 most recent PHQ Screens 8/24/2020   PHQ Not Done Patient Decline   Little interest or pleasure in doing things Not at all   Feeling down, depressed, irritable, or hopeless Not at all   Total Score PHQ 2 0     Depression Review:  Patient is seen for screen of depression,denies anhedonia, weight gain, insomnia, hypersomnia, psychomotor agitation, psychomotor retardation, fatigue, feelings of worthlessness/guilt, difficulty concentrating, hopelessness, impaired memory and recurrent thoughts of death Treatment includes no medication   She denies recurrent thoughts of death and suicidal thoughts without plan. Fall Risk Assessment:    Fall Risk Assessment, last 12 mths 8/24/2020   Able to walk? Yes   Fall in past 12 months? No       Functional Ability:   Does the patient exhibit a steady gait? yes   How long did it take the patient to get up and walk from a sitting position? seconds   Is the patient self reliant?  (ie can do own laundry, meals, household chores)  yes     Does the patient handle his/her own medications? yes     Does the patient handle his/her own money? yes     Is the patients home safe (ie good lighting, handrails on stairs and bath, etc.)? yes     Did you notice or did patient express any hearing difficulties? no     Did you notice or did patient express any vision difficulties?   no     Were distance and reading eye charts used? no       Advance Care Planning:   Patient was offered the opportunity to discuss advance care planning:  yes     Does patient have an Advance Directive:  no   If no, did you provide information on Caring Connections?   yes       Plan:      Orders Placed This Encounter    AMB POC GLUCOSE BLOOD, BY GLUCOSE MONITORING DEVICE    AMB POC HEMOGLOBIN A1C    AMB POC LIPID PROFILE    DISCONTD: cefdinir (OMNICEF) 300 mg capsule    cefdinir (OMNICEF) 300 mg capsule       Health Maintenance   Topic Date Due    Eye Exam Retinal or Dilated  12/05/1960    Shingrix Vaccine Age 50> (1 of 2) 12/05/2000    GLAUCOMA SCREENING Q2Y  12/05/2015    Pneumococcal 65+ years (1 of 1 - PPSV23) 12/05/2015    MICROALBUMIN Q1  01/24/2019    FOBT Q1Y Age 50-75  11/28/2019    A1C test (Diabetic or Prediabetic)  05/19/2020    Medicare Yearly Exam  06/20/2020  Foot Exam Q1  01/23/2021    Lipid Screen  02/19/2021    DTaP/Tdap/Td series (2 - Td) 01/24/2028    Hepatitis C Screening  Completed       *Patient verbalized understanding and agreement with the plan. A copy of the After Visit Summary with personalized health plan was given to the patient today. Review of Systems  Constitutional: negative for fevers, chills, anorexia and weight loss  Eyes:   negative for visual disturbance and irritation  ENT:   negative for tinnitus,sore throat,nasal congestion,ear pains. hoarseness  Respiratory:   dyspnea  CV:   negative for chest pain, palpitations, lower extremity edema  GI:   negative for nausea, vomiting, diarrhea, abdominal pain,melena  Endo:               negative for polyuria,polydipsia,polyphagia,heat intolerance  Genitourinary: negative for frequency, dysuria and hematuria  Integument:  negative for rash and pruritus  Hematologic:  negative for easy bruising and gum/nose bleeding  Musculoskel: negative for myalgias, arthralgias, back pain, muscle weakness, joint pain  Neurological:  negative for headaches, dizziness, vertigo, memory problems and gait   Behavl/Psych: negative for feelings of anxiety, depression, mood changes    Past Medical History:   Diagnosis Date    Diabetes ([facility] [address]      Past Surgical History:   Procedure Laterality Date    HX ORTHOPAEDIC  2012    lumbar    HX PROSTATECTOMY  2012    HX UROLOGICAL  2012     Social History     Socioeconomic History    Marital status: SINGLE     Spouse name: Not on file    Number of children: 2    Years of education: Not on file    Highest education level: Not on file   Tobacco Use    Smoking status: Never Smoker    Smokeless tobacco: Never Used   Substance and Sexual Activity    Alcohol use: No    Drug use: No   Other Topics Concern     Service No    Blood Transfusions No    Caffeine Concern No    Occupational Exposure No    Hobby Hazards No    Sleep Concern No    Stress Concern No    Weight Concern No    Special Diet No    Back Care No    Exercise Yes    Bike Helmet No    Seat Belt No    Self-Exams No     History reviewed. No pertinent family history. Current Outpatient Medications   Medication Sig Dispense Refill    cefdinir (OMNICEF) 300 mg capsule Take 1 Cap by mouth two (2) times a day. 14 Cap 0    SITagliptin (JANUVIA) 100 mg tablet Take 1 Tab by mouth daily. 30 Tab 12    aspirin delayed-release 81 mg tablet Take  by mouth daily.  Lactobacillus acidophilus (PROBIOTIC PO) Take  by mouth.  glipiZIDE (GLUCOTROL) 10 mg tablet TAKE 1 TABLET BY MOUTH TWICE DAILY 180 Tab 3    metFORMIN (GLUCOPHAGE) 1,000 mg tablet TAKE 1 TABLET BY MOUTH TWICE DAILY WITH  A  MEAL 180 Tab 3    atorvastatin (LIPITOR) 40 mg tablet TAKE 1 TABLET BY MOUTH ONCE DAILY 90 Tab 3     No Known Allergies    Objective:  Visit Vitals  /80 (BP 1 Location: Right arm, BP Patient Position: Sitting)   Pulse 94   Temp 98.6 °F (37 °C) (Oral)   Resp 16   Ht 5' 11\" (1.803 m)   Wt 220 lb (99.8 kg)   SpO2 97%   BMI 30.68 kg/m²     Physical Exam:   General appearance - alert, well appearing, and in no distress  Mental status - alert, oriented to person, place, and time  EYE-DAVIAN, EOMI, corneas normal, no foreign bodies  ENT-ENT exam normal, no neck nodes or sinus tenderness  Nose - normal and patent, no erythema, discharge or polyps  Mouth - mucous membranes moist, pharynx normal without lesions  Neck - supple, no significant adenopathy   Chest - clear to auscultation, no wheezes, rales or rhonchi, symmetric air entry   Heart - normal rate, regular rhythm, normal S1, S2, no murmurs, rubs, clicks or gallops   Abdomen - soft, nontender, nondistended, no masses or organomegaly  Lymph- no adenopathy palpable  Ext-peripheral pulses normal, no pedal edema, no clubbing or cyanosis  Skin-Warm and dry.  no hyperpigmentation, vitiligo, or suspicious lesions  Neuro -alert, oriented, normal speech, no focal findings or movement disorder noted  Neck-normal C-spine, no tenderness, full ROM without pain  Feet-no nail deformities or callus formation with good pulses noted      Results for orders placed or performed in visit on 08/24/20   AMB POC GLUCOSE BLOOD, BY GLUCOSE MONITORING DEVICE   Result Value Ref Range    Glucose  mg/dL   AMB POC HEMOGLOBIN A1C   Result Value Ref Range    Hemoglobin A1c (POC) 8.3 %   AMB POC LIPID PROFILE   Result Value Ref Range    Cholesterol (POC) 128     Triglycerides (POC) 70     HDL Cholesterol (POC) 44     LDL Cholesterol (POC) 71 MG/DL    Non-HDL Goal (POC) 85     TChol/HDL Ratio (POC) 2.9        Assessment/Plan:    ICD-10-CM ICD-9-CM    1. Pneumonia of right lung due to infectious organism, unspecified part of lung  J18.9 483.8    2. Pleural effusion  J90 511.9    3. Essential hypertension  I10 401.9    4. Type II or unspecified type diabetes mellitus with neurological manifestations, uncontrolled(250.62) (HCC)  E11.49 250.62 AMB POC GLUCOSE BLOOD, BY GLUCOSE MONITORING DEVICE      AMB POC HEMOGLOBIN A1C   5. Hypercholesteremia  E78.00 272.0 AMB POC LIPID PROFILE   6. Medicare annual wellness visit, subsequent  Z00.00 V70.0      Orders Placed This Encounter    AMB POC GLUCOSE BLOOD, BY GLUCOSE MONITORING DEVICE    AMB POC HEMOGLOBIN A1C    AMB POC LIPID PROFILE    DISCONTD: cefdinir (OMNICEF) 300 mg capsule    cefdinir (OMNICEF) 300 mg capsule     Sig: Take 1 Cap by mouth two (2) times a day. Dispense:  14 Cap     Refill:  0     call if any problems  Patient Instructions   [employer] Activation    Thank you for requesting access to [employer]. Please follow the instructions below to securely access and download your online medical record. [employer] allows you to send messages to your doctor, view your test results, renew your prescriptions, schedule appointments, and more. How Do I Sign Up? 1. In your internet browser, go to [email or web address]  2. Click on the First Time User? Click Here link in the Sign In box. You will be redirect to the New Member Sign Up page. 3. Enter your SlideMail Access Code exactly as it appears below. You will not need to use this code after youve completed the sign-up process. If you do not sign up before the expiration date, you must request a new code. SlideMail Access Code: [email or web address]  Expires: 10/8/2020  8:05 AM (This is the date your SlideMail access code will )    4. Enter the last four digits of your Social Security Number (xxxx) and Date of Birth (mm/dd/yyyy) as indicated and click Submit. You will be taken to the next sign-up page. 5. Create a Novastt ID. This will be your SlideMail login ID and cannot be changed, so think of one that is secure and easy to remember. 6. Create a SlideMail password. You can change your password at any time. 7. Enter your Password Reset Question and Answer. This can be used at a later time if you forget your password. 8. Enter your e-mail address. You will receive e-mail notification when new information is available in [address] 9. Click Sign Up. You can now view and download portions of your medical record. 10. Click the Download Summary menu link to download a portable copy of your medical information. Additional Information    If you have questions, please visit the Frequently Asked Questions section of the SlideMail website at [email or web address]. [email or web address] Remember, SlideMail is NOT to be used for urgent needs. For medical emergencies, dial 911. Follow-up and Dispositions    · Return in about 4 weeks (around 2020), or if symptoms worsen or fail to improve. I have reviewed with the patient details of the assessment and plan and all questions were answered. Relevent patient education was performed    An After Visit Summary was printed and given to the patient.

## 2020-08-25 ENCOUNTER — OFFICE VISIT (OUTPATIENT)
Dept: ENDOCRINOLOGY | Age: 70
End: 2020-08-25
Payer: MEDICARE

## 2020-08-25 VITALS
TEMPERATURE: 97.7 F | WEIGHT: 221 LBS | OXYGEN SATURATION: 97 % | HEIGHT: 71 IN | BODY MASS INDEX: 30.94 KG/M2 | RESPIRATION RATE: 18 BRPM | DIASTOLIC BLOOD PRESSURE: 80 MMHG | SYSTOLIC BLOOD PRESSURE: 117 MMHG | HEART RATE: 99 BPM

## 2020-08-25 DIAGNOSIS — E78.2 MIXED HYPERLIPIDEMIA: ICD-10-CM

## 2020-08-25 DIAGNOSIS — E11.65 UNCONTROLLED TYPE 2 DIABETES MELLITUS WITH HYPERGLYCEMIA (HCC): Primary | ICD-10-CM

## 2020-08-25 DIAGNOSIS — I10 ESSENTIAL HYPERTENSION: ICD-10-CM

## 2020-08-25 PROCEDURE — 3052F HG A1C>EQUAL 8.0%<EQUAL 9.0%: CPT | Performed by: INTERNAL MEDICINE

## 2020-08-25 PROCEDURE — 3017F COLORECTAL CA SCREEN DOC REV: CPT | Performed by: INTERNAL MEDICINE

## 2020-08-25 PROCEDURE — G8754 DIAS BP LESS 90: HCPCS | Performed by: INTERNAL MEDICINE

## 2020-08-25 PROCEDURE — G8752 SYS BP LESS 140: HCPCS | Performed by: INTERNAL MEDICINE

## 2020-08-25 PROCEDURE — 2022F DILAT RTA XM EVC RTNOPTHY: CPT | Performed by: INTERNAL MEDICINE

## 2020-08-25 PROCEDURE — G8510 SCR DEP NEG, NO PLAN REQD: HCPCS | Performed by: INTERNAL MEDICINE

## 2020-08-25 PROCEDURE — G8427 DOCREV CUR MEDS BY ELIG CLIN: HCPCS | Performed by: INTERNAL MEDICINE

## 2020-08-25 PROCEDURE — 1101F PT FALLS ASSESS-DOCD LE1/YR: CPT | Performed by: INTERNAL MEDICINE

## 2020-08-25 PROCEDURE — G8417 CALC BMI ABV UP PARAM F/U: HCPCS | Performed by: INTERNAL MEDICINE

## 2020-08-25 PROCEDURE — G8536 NO DOC ELDER MAL SCRN: HCPCS | Performed by: INTERNAL MEDICINE

## 2020-08-25 PROCEDURE — 99214 OFFICE O/P EST MOD 30 MIN: CPT | Performed by: INTERNAL MEDICINE

## 2020-08-25 RX ORDER — GLIPIZIDE 10 MG/1
TABLET ORAL
Qty: 180 TAB | Refills: 3 | Status: SHIPPED | OUTPATIENT
Start: 2020-08-25 | End: 2021-05-12 | Stop reason: SDUPTHER

## 2020-08-25 RX ORDER — METFORMIN HYDROCHLORIDE 1000 MG/1
TABLET ORAL
Qty: 180 TAB | Refills: 3 | Status: SHIPPED | OUTPATIENT
Start: 2020-08-25 | End: 2021-05-12 | Stop reason: SDUPTHER

## 2020-08-25 NOTE — PROGRESS NOTES
HISTORY OF PRESENT ILLNESS  Kathi Faulkner is a 71 y.o. male. HPI  Patient here for  FIRST  VISIT  AFTER  initial visit of Type 2 diabetes mellitus  From jan 2020      Admitted there for  Pneumonia in august 2020 - [facility]   Just got out of hospital     But he says he will start taking care of himself better   Plans to bring the meter     He is started on januvia by pcp , he says it is a bit pricy       OLD HISTORY  :    Referred : by self/pcp    H/o diabetes for few years     Current A1C is 8 % and symptoms/problems include none     Current diabetic medications include metformin and glipizide . Current monitoring regimen: home blood tests - never   Home blood sugar records: trend: unknown  Any episodes of hypoglycemia? no    Weight trend: increasing steadily  Prior visit with dietician: no  Current diet: \"unhealthy\" diet in general  Current exercise: no regular exercise    Known diabetic complications: none  Cardiovascular risk factors: dyslipidemia, diabetes mellitus, sedentary life style, male gender, hypertension    Eye exam current (within one year): no  CAMRON: no     Past Medical History:   Diagnosis Date    Diabetes [facility])      Past Surgical History:   Procedure Laterality Date    HX ORTHOPAEDIC  2012    lumbar    HX PROSTATECTOMY  2012    HX UROLOGICAL  2012     Current Outpatient Medications   Medication Sig    cefdinir (OMNICEF) 300 mg capsule Take 1 Cap by mouth two (2) times a day.  SITagliptin (JANUVIA) 100 mg tablet Take 1 Tab by mouth daily.  aspirin delayed-release 81 mg tablet Take  by mouth daily.  Lactobacillus acidophilus (PROBIOTIC PO) Take  by mouth.  glipiZIDE (GLUCOTROL) 10 mg tablet TAKE 1 TABLET BY MOUTH TWICE DAILY    metFORMIN (GLUCOPHAGE) 1,000 mg tablet TAKE 1 TABLET BY MOUTH TWICE DAILY WITH  A  MEAL    atorvastatin (LIPITOR) 40 mg tablet TAKE 1 TABLET BY MOUTH ONCE DAILY     No current facility-administered medications for this visit.         Review of Systems   Constitutional: Negative. HENT: Negative. Eyes: Negative. Respiratory: Negative. Cardiovascular: Negative. Gastrointestinal: Negative. Genitourinary: Negative. Musculoskeletal: Negative. Skin: Negative. Neurological: Negative. Endo/Heme/Allergies: Negative. Psychiatric/Behavioral: Negative. Physical Exam  Constitutional:       Appearance: He is well-developed. HENT:      Head: Normocephalic. Eyes:      Conjunctiva/sclera: Conjunctivae normal.      Pupils: Pupils are equal, round, and reactive to light. Neck:      Musculoskeletal: Normal range of motion and neck supple. Cardiovascular:      Rate and Rhythm: Normal rate and regular rhythm. Pulmonary:      Effort: Pulmonary effort is normal.      Breath sounds: Normal breath sounds. Abdominal:      General: Bowel sounds are normal.      Palpations: Abdomen is soft. Musculoskeletal: Normal range of motion. Skin:     General: Skin is warm and dry. Neurological:      Mental Status: He is alert and oriented to person, place, and time. Deep Tendon Reflexes: Reflexes are normal and symmetric. Lab Results   Component Value Date/Time    Hemoglobin A1c 6.5 (H) 01/26/2017 02:35 PM    Hemoglobin A1c, External 6.8 01/04/2018    Glucose 127 (H) 08/24/2020 08:54 AM    Glucose  08/24/2020 08:43 AM    Creatinine 0.93 08/24/2020 08:54 AM      Lab Results   Component Value Date/Time    Cholesterol (POC) 128 08/24/2020 08:44 AM    LDL Cholesterol (POC) 71 08/24/2020 08:44 AM    LDL-C, External 80 01/04/2018    Triglycerides (POC) 70 08/24/2020 08:44 AM     Lab Results   Component Value Date/Time    ALT (SGPT) 39 08/24/2020 08:54 AM    Alk.  phosphatase 105 08/24/2020 08:54 AM    Bilirubin, total 0.5 08/24/2020 08:54 AM    Albumin 3.1 (L) 08/24/2020 08:54 AM    Protein, total 8.2 08/24/2020 08:54 AM    PLATELET 312 59/86/6548 08:54 AM     Lab Results   Component Value Date/Time    GFR est non-AA >60 08/24/2020 08:54 AM    GFR est AA >60 08/24/2020 08:54 AM    Creatinine 0.93 08/24/2020 08:54 AM    BUN 6 08/24/2020 08:54 AM    Sodium 138 08/24/2020 08:54 AM    Potassium 4.6 08/24/2020 08:54 AM    Chloride 105 08/24/2020 08:54 AM    CO2 28 08/24/2020 08:54 AM             ASSESSMENT and PLAN    1. Type 2 DM, uncontrolled :  a1c is  8.3 %    From today  August 24 th 2020     Compared to    8 %  From nov 2019 August 2020   Just got out of hospital  With pneumonia   He is to improve his nutrition   Am glad he understands the rationale behind checks and promises to do better   Stay on januvia,  Metformin and glipizide         Jan 2020    Discussed DM 2 patho-physiology extensively      Reviewed the glucose log : no( he does not like to check sugars )  Continuing on metformin and glipzide     Patient is advised about checking blood sugars 1-2 times a day and maintaining log book. The danger of having low blood sugars has been explained with inappropriate use of insulin  Patient voiced understanding and using the printed instructions at home. Hypoglycemia management has been explained to the patient. Carbohydrate counting discussed with the patient   lab results and schedule of future lab studies reviewed with patient    2. Hypoglycemia :  Educated on treating the hypoglycemia. Discussed Glipizide  use and prescribed    3. HTN : continue current care. Patient is educated about importance of compliance with anti-hypertensives especially ARB/ACEI    4. Dyslipidemia : continue current meds. Patient is educated about benefits and adverse effects of statins and explained how benefits outweigh risk. 5. use of aspirin to prevent MI and TIA's discussed      6.  His nutritional status is down based on labs from aug 24 2020       > 50 % of time is spent on counseling   Patient voiced understanding her plan of care

## 2020-08-25 NOTE — PROGRESS NOTES
1. Have you been to the ER, urgent care clinic since your last visit? [facility]/Pneumonia/August 10, 2020  Hospitalized since your last visit?9 days    2. Have you seen or consulted any other health care providers outside of the [address] [place] [address] since your last visit? Include any pap smears or colon screening. No    Wt Readings from Last 3 Encounters:   08/25/20 221 lb (100.2 kg)   08/24/20 220 lb (99.8 kg)   02/19/20 245 lb (111.1 kg)     Temp Readings from Last 3 Encounters:   08/25/20 97.7 °F (36.5 °C) (Oral)   08/24/20 98.6 °F (37 °C) (Oral)   02/19/20 98 °F (36.7 °C) (Oral)     BP Readings from Last 3 Encounters:   08/25/20 117/80   08/24/20 120/80   02/19/20 136/84     Pulse Readings from Last 3 Encounters:   08/25/20 99   08/24/20 94   02/19/20 74     Lab Results   Component Value Date/Time    Hemoglobin A1c 6.5 (H) 01/26/2017 02:35 PM    Hemoglobin A1c (POC) 8.3 08/24/2020 08:43 AM    Hemoglobin A1c, External 6.8 01/04/2018     Patient does not have meter today.

## 2020-08-25 NOTE — PATIENT INSTRUCTIONS
DRINK water   Do not skip meals  Do not eat in between meals    Reduce carbs- pasta, rice, potatoes, bread   Do not drink juices or sodas, even they are calorie zero or diet drinks  Do not eat peanut butter     Do not eat sugar free cookies and cakes   Do not eat peaches, oranges, pineapples, raisins, grapes , canteloupe , honey dew and fruit medleys      -----------------------------------------------------------------------------------------------------------------      Stay on  Metformin 1000 mg  One pill   Twice   a day with meals       Stay  on glipizide 10 mg , twenty minutes before b-fast and dinner       Stay on januvia  100 mg a day       ---------------------------------------------------------------------------------------------------------------

## 2020-08-25 NOTE — LETTER
8/25/20 Patient: Claudine Warner YOB: 1950 Date of Visit: 8/25/2020 Linda Jimenes MD 
[phone] VIA In Basket Dear Linda Jimenes MD, Thank you for referring Mr. Claudine Warner to [address] [place] [address] for evaluation. My notes for this consultation are attached. If you have questions, please do not hesitate to call me. I look forward to following your patient along with you. Sincerely, Judy Phillip MD

## 2020-09-21 ENCOUNTER — OFFICE VISIT (OUTPATIENT)
Dept: INTERNAL MEDICINE CLINIC | Age: 70
End: 2020-09-21
Payer: MEDICARE

## 2020-09-21 VITALS
DIASTOLIC BLOOD PRESSURE: 78 MMHG | BODY MASS INDEX: 32.76 KG/M2 | HEART RATE: 86 BPM | SYSTOLIC BLOOD PRESSURE: 114 MMHG | HEIGHT: 71 IN | OXYGEN SATURATION: 97 % | RESPIRATION RATE: 16 BRPM | TEMPERATURE: 98 F | WEIGHT: 234 LBS

## 2020-09-21 DIAGNOSIS — E78.00 HYPERCHOLESTEREMIA: ICD-10-CM

## 2020-09-21 DIAGNOSIS — D64.9 ANEMIA, UNSPECIFIED TYPE: Primary | ICD-10-CM

## 2020-09-21 DIAGNOSIS — E11.49 TYPE II OR UNSPECIFIED TYPE DIABETES MELLITUS WITH NEUROLOGICAL MANIFESTATIONS, UNCONTROLLED(250.62) (HCC): ICD-10-CM

## 2020-09-21 LAB
ERYTHROCYTE [DISTWIDTH] IN BLOOD BY AUTOMATED COUNT: 13.5 % (ref 11.5–14.5)
HCT VFR BLD AUTO: 39.8 % (ref 36.6–50.3)
HGB BLD-MCNC: 11.9 G/DL (ref 12.1–17)
MCH RBC QN AUTO: 29.6 PG (ref 26–34)
MCHC RBC AUTO-ENTMCNC: 29.9 G/DL (ref 30–36.5)
MCV RBC AUTO: 99 FL (ref 80–99)
NRBC # BLD: 0 K/UL (ref 0–0.01)
NRBC BLD-RTO: 0 PER 100 WBC
PLATELET # BLD AUTO: 203 K/UL (ref 150–400)
PMV BLD AUTO: 11.2 FL (ref 8.9–12.9)
RBC # BLD AUTO: 4.02 M/UL (ref 4.1–5.7)
WBC # BLD AUTO: 6.5 K/UL (ref 4.1–11.1)

## 2020-09-21 PROCEDURE — G8752 SYS BP LESS 140: HCPCS | Performed by: INTERNAL MEDICINE

## 2020-09-21 PROCEDURE — G8754 DIAS BP LESS 90: HCPCS | Performed by: INTERNAL MEDICINE

## 2020-09-21 PROCEDURE — G8536 NO DOC ELDER MAL SCRN: HCPCS | Performed by: INTERNAL MEDICINE

## 2020-09-21 PROCEDURE — 99213 OFFICE O/P EST LOW 20 MIN: CPT | Performed by: INTERNAL MEDICINE

## 2020-09-21 PROCEDURE — 3052F HG A1C>EQUAL 8.0%<EQUAL 9.0%: CPT | Performed by: INTERNAL MEDICINE

## 2020-09-21 PROCEDURE — G8432 DEP SCR NOT DOC, RNG: HCPCS | Performed by: INTERNAL MEDICINE

## 2020-09-21 PROCEDURE — 2022F DILAT RTA XM EVC RTNOPTHY: CPT | Performed by: INTERNAL MEDICINE

## 2020-09-21 PROCEDURE — G8427 DOCREV CUR MEDS BY ELIG CLIN: HCPCS | Performed by: INTERNAL MEDICINE

## 2020-09-21 PROCEDURE — G8417 CALC BMI ABV UP PARAM F/U: HCPCS | Performed by: INTERNAL MEDICINE

## 2020-09-21 PROCEDURE — 1101F PT FALLS ASSESS-DOCD LE1/YR: CPT | Performed by: INTERNAL MEDICINE

## 2020-09-21 PROCEDURE — 3017F COLORECTAL CA SCREEN DOC REV: CPT | Performed by: INTERNAL MEDICINE

## 2020-09-21 NOTE — ADDENDUM NOTE
Addended by: Liliana Cutler on: 9/21/2020 12:16 PM     Modules accepted: Orders Pt. Here for phase 2 Cardiac Rehab visit.

## 2020-09-21 NOTE — PROGRESS NOTES
1. Have you been to the ER, urgent care clinic since your last visit? Hospitalized since your last visit?no    2. Have you seen or consulted any other health care providers outside of the [address] [place] [address] since your last visit? Include any pap smears or colon screening.  No    3 most recent PHQ Screens 8/25/2020   PHQ Not Done -   Little interest or pleasure in doing things Not at all   Feeling down, depressed, irritable, or hopeless Not at all   Total Score PHQ 2 0

## 2020-09-21 NOTE — PATIENT INSTRUCTIONS
[employer] Activation    Thank you for requesting access to [employer]. Please follow the instructions below to securely access and download your online medical record. [employer] allows you to send messages to your doctor, view your test results, renew your prescriptions, schedule appointments, and more. How Do I Sign Up? 1. In your internet browser, go to [email or web address]  2. Click on the First Time User? Click Here link in the Sign In box. You will be redirect to the New Member Sign Up page. 3. Enter your [employer] Access Code exactly as it appears below. You will not need to use this code after youve completed the sign-up process. If you do not sign up before the expiration date, you must request a new code. [employer] Access Code: [email or web address]  Expires: 2020  9:42 AM (This is the date your [employer] access code will )    4. Enter the last four digits of your Social Security Number (xxxx) and Date of Birth (mm/dd/yyyy) as indicated and click Submit. You will be taken to the next sign-up page. 5. Create a [employer] ID. This will be your [employer] login ID and cannot be changed, so think of one that is secure and easy to remember. 6. Create a [employer] password. You can change your password at any time. 7. Enter your Password Reset Question and Answer. This can be used at a later time if you forget your password. 8. Enter your e-mail address. You will receive e-mail notification when new information is available in [address] 9. Click Sign Up. You can now view and download portions of your medical record. 10. Click the Download Summary menu link to download a portable copy of your medical information. Additional Information    If you have questions, please visit the Frequently Asked Questions section of the [employer] website at [email or web address] Remember, [employer] is NOT to be used for urgent needs. For medical emergencies, dial 911.

## 2020-09-21 NOTE — PROGRESS NOTES
aYnet Beavers is a 71 y.o. male and presents with Pneumonia (f/u)  . Subjective:  Pneumonia Review:  Yanet Beavers is a 71 y.o. male who complains of nasal congestion,sore throat,fever,chills productive cough, myalgias  for the past few days, gradually worsening since that time. A most recent chest x-ray revealed a pneumonia,this is a follow up review,  He denies a history of shortness of breath. Evaluation to date: none. Treatment to date: decongestants, antihistamines, cough suppressants, OTC products. Patient does not smoke cigarettes. Anemia  Patient presents for  evaluation of anemia. Anemia was found by routine CBC. It has been present for several months. Associated signs & symptoms: fatigue        Review of Systems  Constitutional: negative for fevers, chills, anorexia and weight loss  Eyes:   negative for visual disturbance and irritation  ENT:   negative for tinnitus,sore throat,nasal congestion,ear pains. hoarseness  Respiratory:  negative for cough, hemoptysis, dyspnea,wheezing  CV:   negative for chest pain, palpitations, lower extremity edema  GI:   negative for nausea, vomiting, diarrhea, abdominal pain,melena  Endo:               negative for polyuria,polydipsia,polyphagia,heat intolerance  Genitourinary: negative for frequency, dysuria and hematuria  Integument:  negative for rash and pruritus  Hematologic:  negative for easy bruising and gum/nose bleeding  Musculoskel: negative for myalgias, arthralgias, back pain, muscle weakness, joint pain  Neurological:  negative for headaches, dizziness, vertigo, memory problems and gait   Behavl/Psych: negative for feelings of anxiety, depression, mood changes    Past Medical History:   Diagnosis Date    Diabetes [facility])      Past Surgical History:   Procedure Laterality Date    HX ORTHOPAEDIC  2012    lumbar    HX PROSTATECTOMY  2012    HX UROLOGICAL  2012     Social History     Socioeconomic History    Marital status: SINGLE     Spouse name: Not on file    Number of children: 2    Years of education: Not on file    Highest education level: Not on file   Tobacco Use    Smoking status: Never Smoker    Smokeless tobacco: Never Used   Substance and Sexual Activity    Alcohol use: No    Drug use: No   Other Topics Concern     Service No    Blood Transfusions No    Caffeine Concern No    Occupational Exposure No    Hobby Hazards No    Sleep Concern No    Stress Concern No    Weight Concern No    Special Diet No    Back Care No    Exercise Yes    Bike Helmet No    Seat Belt No    Self-Exams No     History reviewed. No pertinent family history. Current Outpatient Medications   Medication Sig Dispense Refill    metFORMIN (GLUCOPHAGE) 1,000 mg tablet TAKE 1 TABLET BY MOUTH TWICE DAILY WITH  A  MEAL 180 Tab 3    glipiZIDE (GLUCOTROL) 10 mg tablet TAKE 1 TABLET BY MOUTH TWICE DAILY 180 Tab 3    SITagliptin (JANUVIA) 100 mg tablet Take 1 Tab by mouth daily. 90 Tab 3    cefdinir (OMNICEF) 300 mg capsule Take 1 Cap by mouth two (2) times a day. 14 Cap 0    aspirin delayed-release 81 mg tablet Take  by mouth daily.  Lactobacillus acidophilus (PROBIOTIC PO) Take  by mouth.       atorvastatin (LIPITOR) 40 mg tablet TAKE 1 TABLET BY MOUTH ONCE DAILY 90 Tab 3     No Known Allergies    Objective:  Visit Vitals  /78   Pulse 86   Temp 98 °F (36.7 °C) (Oral)   Resp 16   Ht 5' 11\" (1.803 m)   Wt 234 lb (106.1 kg)   SpO2 97%   BMI 32.64 kg/m²     Physical Exam:   General appearance - alert, well appearing, and in no distress  Mental status - alert, oriented to person, place, and time  EYE-DAVIAN, EOMI, corneas normal, no foreign bodies  ENT-ENT exam normal, no neck nodes or sinus tenderness  Nose - normal and patent, no erythema, discharge or polyps  Mouth - mucous membranes moist, pharynx normal without lesions  Neck - supple, no significant adenopathy   Chest - clear to auscultation, no wheezes, rales or rhonchi, symmetric air entry Heart - normal rate, regular rhythm, normal S1, S2, no murmurs, rubs, clicks or gallops   Abdomen - soft, nontender, nondistended, no masses or organomegaly  Lymph- no adenopathy palpable  Ext-peripheral pulses normal, no pedal edema, no clubbing or cyanosis  Skin-Warm and dry. no hyperpigmentation, vitiligo, or suspicious lesions  Neuro -alert, oriented, normal speech, no focal findings or movement disorder noted  Neck-normal C-spine, no tenderness, full ROM without pain  Feet-no nail deformities or callus formation with good pulses noted      Results for orders placed or performed in visit on 08/24/20   CBC W/O DIFF   Result Value Ref Range    WBC 7.2 4.1 - 11.1 K/uL    RBC 3.18 (L) 4.10 - 5.70 M/uL    HGB 9.3 (L) 12.1 - 17.0 g/dL    HCT 32.2 (L) 36.6 - 50.3 %    .3 (H) 80.0 - 99.0 FL    MCH 29.2 26.0 - 34.0 PG    MCHC 28.9 (L) 30.0 - 36.5 g/dL    RDW 15.6 (H) 11.5 - 14.5 %    PLATELET 033 820 - 510 K/uL    MPV 10.9 8.9 - 12.9 FL    NRBC 0.0 0  WBC    ABSOLUTE NRBC 0.00 0.00 - 9.22 K/uL   METABOLIC PANEL, COMPREHENSIVE   Result Value Ref Range    Sodium 138 136 - 145 mmol/L    Potassium 4.6 3.5 - 5.1 mmol/L    Chloride 105 97 - 108 mmol/L    CO2 28 21 - 32 mmol/L    Anion gap 5 5 - 15 mmol/L    Glucose 127 (H) 65 - 100 mg/dL    BUN 6 6 - 20 MG/DL    Creatinine 0.93 0.70 - 1.30 MG/DL    BUN/Creatinine ratio 6 (L) 12 - 20      GFR est AA >60 >60 ml/min/1.73m2    GFR est non-AA >60 >60 ml/min/1.73m2    Calcium 9.2 8.5 - 10.1 MG/DL    Bilirubin, total 0.5 0.2 - 1.0 MG/DL    ALT (SGPT) 39 12 - 78 U/L    AST (SGOT) 64 (H) 15 - 37 U/L    Alk.  phosphatase 105 45 - 117 U/L    Protein, total 8.2 6.4 - 8.2 g/dL    Albumin 3.1 (L) 3.5 - 5.0 g/dL    Globulin 5.1 (H) 2.0 - 4.0 g/dL    A-G Ratio 0.6 (L) 1.1 - 2.2     AMB POC GLUCOSE BLOOD, BY GLUCOSE MONITORING DEVICE   Result Value Ref Range    Glucose  mg/dL   AMB POC HEMOGLOBIN A1C   Result Value Ref Range    Hemoglobin A1c (POC) 8.3 %   AMB POC LIPID PROFILE   Result Value Ref Range    Cholesterol (POC) 128     Triglycerides (POC) 70     HDL Cholesterol (POC) 44     LDL Cholesterol (POC) 71 MG/DL    Non-HDL Goal (POC) 85     TChol/HDL Ratio (POC) 2.9        Assessment/Plan:    ICD-10-CM ICD-9-CM    1. Anemia, unspecified type  D64.9 285.9 CBC W/O DIFF   2. Hypercholesteremia  E78.00 272.0    3. Type II or unspecified type diabetes mellitus with neurological manifestations, uncontrolled(250.62) ([facility])  E11.49 250.62      Orders Placed This Encounter    CBC W/O DIFF     Standing Status:   Future     Standing Expiration Date:   9/21/2021     call if any problems,Take 81mg aspirin daily  There are no Patient Instructions on file for this visit. I have reviewed with the patient details of the assessment and plan and all questions were answered. Relevent patient education was performed. The most recent lab findings were reviewed with the patient. An After Visit Summary was printed and given to the patient.

## 2020-12-16 ENCOUNTER — OFFICE VISIT (OUTPATIENT)
Dept: ENDOCRINOLOGY | Age: 70
End: 2020-12-16
Payer: MEDICARE

## 2020-12-16 VITALS
SYSTOLIC BLOOD PRESSURE: 137 MMHG | OXYGEN SATURATION: 99 % | BODY MASS INDEX: 36.68 KG/M2 | TEMPERATURE: 97.7 F | HEIGHT: 71 IN | RESPIRATION RATE: 18 BRPM | DIASTOLIC BLOOD PRESSURE: 85 MMHG | WEIGHT: 262 LBS | HEART RATE: 74 BPM

## 2020-12-16 DIAGNOSIS — E78.2 MIXED HYPERLIPIDEMIA: ICD-10-CM

## 2020-12-16 DIAGNOSIS — I10 ESSENTIAL HYPERTENSION: ICD-10-CM

## 2020-12-16 DIAGNOSIS — E11.65 UNCONTROLLED TYPE 2 DIABETES MELLITUS WITH HYPERGLYCEMIA (HCC): Primary | ICD-10-CM

## 2020-12-16 PROCEDURE — G8536 NO DOC ELDER MAL SCRN: HCPCS | Performed by: INTERNAL MEDICINE

## 2020-12-16 PROCEDURE — 3044F HG A1C LEVEL LT 7.0%: CPT | Performed by: INTERNAL MEDICINE

## 2020-12-16 PROCEDURE — G8510 SCR DEP NEG, NO PLAN REQD: HCPCS | Performed by: INTERNAL MEDICINE

## 2020-12-16 PROCEDURE — 2022F DILAT RTA XM EVC RTNOPTHY: CPT | Performed by: INTERNAL MEDICINE

## 2020-12-16 PROCEDURE — 3017F COLORECTAL CA SCREEN DOC REV: CPT | Performed by: INTERNAL MEDICINE

## 2020-12-16 PROCEDURE — 1101F PT FALLS ASSESS-DOCD LE1/YR: CPT | Performed by: INTERNAL MEDICINE

## 2020-12-16 PROCEDURE — G8752 SYS BP LESS 140: HCPCS | Performed by: INTERNAL MEDICINE

## 2020-12-16 PROCEDURE — G8417 CALC BMI ABV UP PARAM F/U: HCPCS | Performed by: INTERNAL MEDICINE

## 2020-12-16 PROCEDURE — G8754 DIAS BP LESS 90: HCPCS | Performed by: INTERNAL MEDICINE

## 2020-12-16 PROCEDURE — G8427 DOCREV CUR MEDS BY ELIG CLIN: HCPCS | Performed by: INTERNAL MEDICINE

## 2020-12-16 PROCEDURE — 99214 OFFICE O/P EST MOD 30 MIN: CPT | Performed by: INTERNAL MEDICINE

## 2020-12-16 NOTE — LETTER
12/16/2020 Patient: Kisha More YOB: 1950 Date of Visit: 12/16/2020 Zari Swenson MD 
[address] [address] [address] [employer] Dear Zari Swenson MD, Thank you for referring Mr. Kisha More to [address] [place] [address] for evaluation. My notes for this consultation are attached. If you have questions, please do not hesitate to call me. I look forward to following your patient along with you. Sincerely, Samy Trejo MD

## 2020-12-16 NOTE — PROGRESS NOTES
HISTORY OF PRESENT ILLNESS  Jerome Live is a 79 y.o. male. Diabetes      Patient here for    VISIT  AFTER  initial visit of Type 2 diabetes mellitus  From August 2020      GAINED 30 LBS   Muscle bulk - eating a lot he says   No meter           August 2020     Admitted there for  Pneumonia in august 2020 - [facility]   Just got out of hospital     But he says he will start taking care of himself better   Plans to bring the meter   He is started on januvia by pcp , he says it is a bit pricy       OLD HISTORY  :    Referred : by self/pcp    H/o diabetes for few years   Current A1C is 8 % and symptoms/problems include none   Current diabetic medications include metformin and glipizide . Current monitoring regimen: home blood tests - never   Home blood sugar records: trend: unknown  Any episodes of hypoglycemia? no    Weight trend: increasing steadily  Prior visit with dietician: no  Current diet: \"unhealthy\" diet in general  Current exercise: no regular exercise    Known diabetic complications: none  Cardiovascular risk factors: dyslipidemia, diabetes mellitus, sedentary life style, male gender, hypertension    Eye exam current (within one year): no  CAMRON: no     Past Medical History:   Diagnosis Date    Diabetes ([facility] [address]      Past Surgical History:   Procedure Laterality Date    HX ORTHOPAEDIC  2012    lumbar    HX PROSTATECTOMY  2012    HX UROLOGICAL  2012     Current Outpatient Medications   Medication Sig    metFORMIN (GLUCOPHAGE) 1,000 mg tablet TAKE 1 TABLET BY MOUTH TWICE DAILY WITH  A  MEAL (Patient taking differently: daily. TAKE 1 TABLET BY MOUTH TWICE DAILY WITH  A  MEAL)    glipiZIDE (GLUCOTROL) 10 mg tablet TAKE 1 TABLET BY MOUTH TWICE DAILY (Patient taking differently: 10 mg daily.)    SITagliptin (JANUVIA) 100 mg tablet Take 1 Tab by mouth daily.  Lactobacillus acidophilus (PROBIOTIC PO) Take  by mouth.     atorvastatin (LIPITOR) 40 mg tablet TAKE 1 TABLET BY MOUTH ONCE DAILY    cefdinir (OMNICEF) 300 mg capsule Take 1 Cap by mouth two (2) times a day.  aspirin delayed-release 81 mg tablet Take  by mouth daily. No current facility-administered medications for this visit. Review of Systems   Constitutional: Negative. HENT: Negative. Eyes: Negative. Respiratory: Negative. Cardiovascular: Negative. Gastrointestinal: Negative. Genitourinary: Negative. Musculoskeletal: Negative. Skin: Negative. Neurological: Negative. Endo/Heme/Allergies: Negative. Psychiatric/Behavioral: Negative. Physical Exam  Constitutional:       Appearance: He is well-developed. HENT:      Head: Normocephalic. Eyes:      Conjunctiva/sclera: Conjunctivae normal.      Pupils: Pupils are equal, round, and reactive to light. Neck:      Musculoskeletal: Normal range of motion and neck supple. Cardiovascular:      Rate and Rhythm: Normal rate and regular rhythm. Pulmonary:      Effort: Pulmonary effort is normal.      Breath sounds: Normal breath sounds. Abdominal:      General: Bowel sounds are normal.      Palpations: Abdomen is soft. Musculoskeletal: Normal range of motion. Skin:     General: Skin is warm and dry. Neurological:      Mental Status: He is alert and oriented to person, place, and time. Deep Tendon Reflexes: Reflexes are normal and symmetric.            Lab Results   Component Value Date/Time    Hemoglobin A1c 6.9 (H) 12/08/2020 12:00 AM    Hemoglobin A1c 6.5 (H) 01/26/2017 02:35 PM    Hemoglobin A1c, External 6.8 01/04/2018    Glucose 112 (H) 12/08/2020 12:00 AM    Glucose  08/24/2020 08:43 AM    Microalb/Creat ratio (ug/mg creat.) 9 12/08/2020 12:00 AM    LDL Chol Calc (NIH) 71 12/08/2020 12:00 AM    Creatinine 0.86 12/08/2020 12:00 AM      Lab Results   Component Value Date/Time    Cholesterol, total 149 12/08/2020 12:00 AM    Cholesterol (POC) 128 08/24/2020 08:44 AM    HDL Cholesterol 68 12/08/2020 12:00 AM    LDL Chol Calc ([facility]) 71 12/08/2020 12:00 AM    LDL Cholesterol (POC) 71 08/24/2020 08:44 AM    LDL-C, External 80 01/04/2018    Triglyceride 45 12/08/2020 12:00 AM    Triglycerides (POC) 70 08/24/2020 08:44 AM     Lab Results   Component Value Date/Time    ALT (SGPT) 20 12/08/2020 12:00 AM    Alk. phosphatase 60 12/08/2020 12:00 AM    Bilirubin, total 0.6 12/08/2020 12:00 AM    Albumin 4.6 12/08/2020 12:00 AM    Protein, total 7.6 12/08/2020 12:00 AM    PLATELET 122 77/13/6759 12:20 PM     Lab Results   Component Value Date/Time    GFR est non-AA 88 12/08/2020 12:00 AM    GFR est  12/08/2020 12:00 AM    Creatinine 0.86 12/08/2020 12:00 AM    BUN 10 12/08/2020 12:00 AM    Sodium 141 12/08/2020 12:00 AM    Potassium 4.6 12/08/2020 12:00 AM    Chloride 103 12/08/2020 12:00 AM    CO2 24 12/08/2020 12:00 AM             ASSESSMENT and PLAN    1. Type 2 DM, uncontrolled :  a1c is   6.9 %    FROM DEC 2020    COMPARED TO  8.3 %    From today  August 24 th 2020     Compared to    8 %  From nov 2019       DEC 2020   Gained a lot of weight  - does not seem fluid gain  He says he ate a lot and worked out a lot - muscle bulk   Not using anabolics   He understands that he has to lose weight to prevent weight on his joints   No meter   Stay on januvia,  Metformin and glipizide ( he is using it only once a day )  So, rreduced dose of glipizide to 5 mg bid         August 2020   Just got out of hospital  With pneumonia , lost weight   He is to improve his nutrition   Am glad he understands the rationale behind checks and promises to do better   Stay on januvia,  Metformin and glipizide         Jan 2020    Discussed DM 2 patho-physiology extensively      Reviewed the glucose log : no( he does not like to check sugars )  Continuing on metformin and glipizide   Patient is advised about checking blood sugars 1-2 times a day and maintaining log book. 2. Hypoglycemia :  Educated on treating the hypoglycemia.  Discussed Glipizide  use and prescribed    3. HTN : continue current care. Patient is educated about importance of compliance with anti-hypertensives especially ARB/ACEI    4. Dyslipidemia : continue current meds. Patient is educated about benefits and adverse effects of statins and explained how benefits outweigh risk. 5. use of aspirin to prevent MI and TIA's discussed      6.  His nutritional status is down based on labs from aug 24 2020 -  Now it is BACK UP TO OCT 2020       > 50 % of time is spent on counseling   Patient voiced understanding her plan of care

## 2020-12-16 NOTE — PROGRESS NOTES
1. Have you been to the ER, urgent care clinic since your last visit?no  Hospitalized since your last visit? No    2. Have you seen or consulted any other health care providers outside of the [address] [place] [address] since your last visit? Include any pap smears or colon screening.  No    Wt Readings from Last 3 Encounters:   12/16/20 262 lb (118.8 kg)   09/21/20 234 lb (106.1 kg)   08/25/20 221 lb (100.2 kg)     Temp Readings from Last 3 Encounters:   12/16/20 97.7 °F (36.5 °C) (Oral)   09/21/20 98 °F (36.7 °C) (Oral)   08/25/20 97.7 °F (36.5 °C) (Oral)     BP Readings from Last 3 Encounters:   12/16/20 137/85   09/21/20 114/78   08/25/20 117/80     Pulse Readings from Last 3 Encounters:   12/16/20 74   09/21/20 86   08/25/20 99     Lab Results   Component Value Date/Time    Hemoglobin A1c 6.9 (H) 12/08/2020 12:00 AM    Hemoglobin A1c (POC) 8.3 08/24/2020 08:43 AM    Hemoglobin A1c, External 6.8 01/04/2018

## 2020-12-16 NOTE — PATIENT INSTRUCTIONS
DRINK water   Do not skip meals  Do not eat in between meals    Reduce carbs- pasta, rice, potatoes, bread   Do not drink juices or sodas, even they are calorie zero or diet drinks  Do not eat peanut butter     Do not eat sugar free cookies and cakes   Do not eat peaches, oranges, pineapples, raisins, grapes , canteloupe , honey dew and fruit medleys      -----------------------------------------------------------------------------------------------------------------      Stay on  Metformin 1000 mg  One pill   Twice   a day with meals     DECREASE  on glipizide 5 mg , twenty minutes before b-fast and dinner       Stay on januvia  100 mg a day       ---------------------------------------------------------------------------------------------------------------

## 2021-01-06 ENCOUNTER — OFFICE VISIT (OUTPATIENT)
Dept: INTERNAL MEDICINE CLINIC | Age: 71
End: 2021-01-06
Payer: MEDICARE

## 2021-01-06 VITALS
WEIGHT: 260 LBS | RESPIRATION RATE: 20 BRPM | BODY MASS INDEX: 36.4 KG/M2 | HEART RATE: 75 BPM | DIASTOLIC BLOOD PRESSURE: 80 MMHG | OXYGEN SATURATION: 92 % | HEIGHT: 71 IN | SYSTOLIC BLOOD PRESSURE: 160 MMHG | TEMPERATURE: 98.4 F

## 2021-01-06 DIAGNOSIS — E78.00 HYPERCHOLESTEREMIA: ICD-10-CM

## 2021-01-06 DIAGNOSIS — E11.49 TYPE II OR UNSPECIFIED TYPE DIABETES MELLITUS WITH NEUROLOGICAL MANIFESTATIONS, UNCONTROLLED(250.62) (HCC): Primary | ICD-10-CM

## 2021-01-06 DIAGNOSIS — I10 ESSENTIAL HYPERTENSION: ICD-10-CM

## 2021-01-06 DIAGNOSIS — Z23 ENCOUNTER FOR IMMUNIZATION: ICD-10-CM

## 2021-01-06 LAB
CHOLEST SERPL-MCNC: 165 MG/DL
GLUCOSE POC: 163 MG/DL
HBA1C MFR BLD HPLC: 6.7 %
HDLC SERPL-MCNC: 70 MG/DL
LDL CHOLESTEROL POC: 84 MG/DL
NON-HDL GOAL (POC): 94
TCHOL/HDL RATIO (POC): 2.3
TRIGL SERPL-MCNC: 50 MG/DL

## 2021-01-06 PROCEDURE — 90670 PCV13 VACCINE IM: CPT | Performed by: INTERNAL MEDICINE

## 2021-01-06 PROCEDURE — G0009 ADMIN PNEUMOCOCCAL VACCINE: HCPCS | Performed by: INTERNAL MEDICINE

## 2021-01-06 PROCEDURE — 99214 OFFICE O/P EST MOD 30 MIN: CPT | Performed by: INTERNAL MEDICINE

## 2021-01-06 PROCEDURE — 82962 GLUCOSE BLOOD TEST: CPT | Performed by: INTERNAL MEDICINE

## 2021-01-06 PROCEDURE — 3017F COLORECTAL CA SCREEN DOC REV: CPT | Performed by: INTERNAL MEDICINE

## 2021-01-06 PROCEDURE — G8536 NO DOC ELDER MAL SCRN: HCPCS | Performed by: INTERNAL MEDICINE

## 2021-01-06 PROCEDURE — 1101F PT FALLS ASSESS-DOCD LE1/YR: CPT | Performed by: INTERNAL MEDICINE

## 2021-01-06 PROCEDURE — 80061 LIPID PANEL: CPT | Performed by: INTERNAL MEDICINE

## 2021-01-06 PROCEDURE — G8417 CALC BMI ABV UP PARAM F/U: HCPCS | Performed by: INTERNAL MEDICINE

## 2021-01-06 PROCEDURE — G8753 SYS BP > OR = 140: HCPCS | Performed by: INTERNAL MEDICINE

## 2021-01-06 PROCEDURE — G8427 DOCREV CUR MEDS BY ELIG CLIN: HCPCS | Performed by: INTERNAL MEDICINE

## 2021-01-06 PROCEDURE — G8754 DIAS BP LESS 90: HCPCS | Performed by: INTERNAL MEDICINE

## 2021-01-06 PROCEDURE — 83036 HEMOGLOBIN GLYCOSYLATED A1C: CPT | Performed by: INTERNAL MEDICINE

## 2021-01-06 PROCEDURE — 3044F HG A1C LEVEL LT 7.0%: CPT | Performed by: INTERNAL MEDICINE

## 2021-01-06 PROCEDURE — G8510 SCR DEP NEG, NO PLAN REQD: HCPCS | Performed by: INTERNAL MEDICINE

## 2021-01-06 PROCEDURE — 2022F DILAT RTA XM EVC RTNOPTHY: CPT | Performed by: INTERNAL MEDICINE

## 2021-01-06 NOTE — PROGRESS NOTES
Prasanth Teresa is a 79 y.o. male  who presents for routine immunizations. He denies any symptoms , reactions or allergies that would exclude them from being immunized today. Risks and adverse reactions were discussed and the VIS was given to them. All questions were addressed. He was observed for 5 min post injection. There were no reactions observed.     Kate Kaba LPN

## 2021-01-06 NOTE — PROGRESS NOTES
Yolie Moreno is a 79 y.o. male and presents with Diabetes and Hypertension  . Subjective:  Hypertension Review:  The patient has essential hypertension . Diet and Lifestyle: generally follows a low sodium diet, exercises sporadically  Home BP Monitoring: is not measured at home. Pertinent ROS: taking medications as instructed, no medication side effects noted, no TIA's, no chest pain on exertion, no dyspnea on exertion, no swelling of ankles. Diabetes Mellitus Review:  He has diabetes mellitus. Diabetic ROS - medication compliance: compliant all of the time, diabetic diet compliance: compliant all of the time, home glucose monitoring: is performed. Known diabetic complications: none  Cardiovascular risk factors: family history, dyslipidemia, diabetes mellitus, obesity, hypertension  Current diabetic medications include oral agents/  Eye exam current (within one year): no  Weight trend: stable  Prior visit with dietician: no  Current diet: \"healthy\" diet  in general  Current exercise: walking  Home blood sugar records: trend: stable  Any episodes of hypoglycemia? no      Dyslipidemia Review:  Patient presents for evaluation of lipids. Compliance with treatment thus far has been excellent. A repeat fasting lipid profile was not done. The patient does not use medications that may worsen dyslipidemias (corticosteroids, progestins, anabolic steroids, amiodarone, cyclosporine, olanzapine). The patient exercises some    Health maintenance suggests the needs for a pneumonia vaccine        Review of Systems  Constitutional: negative for fevers, chills, anorexia and weight loss  Eyes:   negative for visual disturbance and irritation  ENT:   negative for tinnitus,sore throat,nasal congestion,ear pains. hoarseness  Respiratory:  negative for cough, hemoptysis, dyspnea,wheezing  CV:   negative for chest pain, palpitations, lower extremity edema  GI:   negative for nausea, vomiting, diarrhea, abdominal pain,melena  Endo:               negative for polyuria,polydipsia,polyphagia,heat intolerance  Genitourinary: negative for frequency, dysuria and hematuria  Integument:  negative for rash and pruritus  Hematologic:  negative for easy bruising and gum/nose bleeding  Musculoskel: negative for myalgias, arthralgias, back pain, muscle weakness, joint pain  Neurological:  negative for headaches, dizziness, vertigo, memory problems and gait   Behavl/Psych: negative for feelings of anxiety, depression, mood changes    Past Medical History:   Diagnosis Date    Diabetes ([facility] [address]      Past Surgical History:   Procedure Laterality Date    HX ORTHOPAEDIC  2012    lumbar    HX PROSTATECTOMY  2012    HX UROLOGICAL  2012     Social History     Socioeconomic History    Marital status: SINGLE     Spouse name: Not on file    Number of children: 2    Years of education: Not on file    Highest education level: Not on file   Tobacco Use    Smoking status: Never Smoker    Smokeless tobacco: Never Used   Substance and Sexual Activity    Alcohol use: No    Drug use: No   Other Topics Concern     Service No    Blood Transfusions No    Caffeine Concern No    Occupational Exposure No    Hobby Hazards No    Sleep Concern No    Stress Concern No    Weight Concern No    Special Diet No    Back Care No    Exercise Yes    Bike Helmet No    Seat Belt No    Self-Exams No     History reviewed. No pertinent family history. Current Outpatient Medications   Medication Sig Dispense Refill    metFORMIN (GLUCOPHAGE) 1,000 mg tablet TAKE 1 TABLET BY MOUTH TWICE DAILY WITH  A  MEAL (Patient taking differently: daily. TAKE 1 TABLET BY MOUTH TWICE DAILY WITH  A  MEAL) 180 Tab 3    glipiZIDE (GLUCOTROL) 10 mg tablet TAKE 1 TABLET BY MOUTH TWICE DAILY (Patient taking differently: 10 mg daily. ) 180 Tab 3    SITagliptin (JANUVIA) 100 mg tablet Take 1 Tab by mouth daily.  90 Tab 3    cefdinir (OMNICEF) 300 mg capsule Take 1 Cap by mouth two (2) times a day. 14 Cap 0    aspirin delayed-release 81 mg tablet Take  by mouth daily.  Lactobacillus acidophilus (PROBIOTIC PO) Take  by mouth.  atorvastatin (LIPITOR) 40 mg tablet TAKE 1 TABLET BY MOUTH ONCE DAILY 90 Tab 3     No Known Allergies    Objective:  Visit Vitals  BP (!) 160/80 (BP 1 Location: Right arm, BP Patient Position: Sitting)   Pulse 75   Temp 98.4 °F (36.9 °C) (Oral)   Resp 20   Ht 5' 11\" (1.803 m)   Wt 260 lb (117.9 kg)   SpO2 92%   BMI 36.26 kg/m²     Physical Exam:   General appearance - alert, well appearing, and in no distress  Mental status - alert, oriented to person, place, and time  EYE-DAVIAN, EOMI, corneas normal, no foreign bodies  ENT-ENT exam normal, no neck nodes or sinus tenderness  Nose - normal and patent, no erythema, discharge or polyps  Mouth - mucous membranes moist, pharynx normal without lesions  Neck - supple, no significant adenopathy   Chest - clear to auscultation, no wheezes, rales or rhonchi, symmetric air entry   Heart - normal rate, regular rhythm, normal S1, S2, no murmurs, rubs, clicks or gallops   Abdomen - soft, nontender, nondistended, no masses or organomegaly  Lymph- no adenopathy palpable  Ext-peripheral pulses normal, no pedal edema, no clubbing or cyanosis  Skin-Warm and dry.  no hyperpigmentation, vitiligo, or suspicious lesions  Neuro -alert, oriented, normal speech, no focal findings or movement disorder noted  Neck-normal C-spine, no tenderness, full ROM without pain  Feet-no nail deformities or callus formation with good pulses noted      Results for orders placed or performed in visit on 12/08/20   MICROALBUMIN, UR, RAND W/ MICROALB/CREAT RATIO   Result Value Ref Range    Creatinine, urine 92.2 Not Estab. mg/dL    Microalbumin, urine 8.2 Not Estab. ug/mL    Microalb/Creat ratio (ug/mg creat.) 9 0 - 29 mg/g creat   METABOLIC PANEL, COMPREHENSIVE   Result Value Ref Range    Glucose 112 (H) 65 - 99 mg/dL    BUN 10 8 - 27 mg/dL Creatinine 0.86 0.76 - 1.27 mg/dL    GFR est non-AA 88 >59 mL/min/1.73    GFR est  >59 mL/min/1.73    BUN/Creatinine ratio 12 10 - 24    Sodium 141 134 - 144 mmol/L    Potassium 4.6 3.5 - 5.2 mmol/L    Chloride 103 96 - 106 mmol/L    CO2 24 20 - 29 mmol/L    Calcium 9.5 8.6 - 10.2 mg/dL    Protein, total 7.6 6.0 - 8.5 g/dL    Albumin 4.6 3.8 - 4.8 g/dL    GLOBULIN, TOTAL 3.0 1.5 - 4.5 g/dL    A-G Ratio 1.5 1.2 - 2.2    Bilirubin, total 0.6 0.0 - 1.2 mg/dL    Alk. phosphatase 60 39 - 117 IU/L    AST (SGOT) 48 (H) 0 - 40 IU/L    ALT (SGPT) 20 0 - 44 IU/L   LIPID PANEL   Result Value Ref Range    Cholesterol, total 149 100 - 199 mg/dL    Triglyceride 45 0 - 149 mg/dL    HDL Cholesterol 68 >39 mg/dL    VLDL, calculated 10 5 - 40 mg/dL    LDL, calculated 71 0 - 99 mg/dL   HEMOGLOBIN A1C WITH EAG   Result Value Ref Range    Hemoglobin A1c 6.9 (H) 4.8 - 5.6 %    Estimated average glucose 151 mg/dL   CVD REPORT   Result Value Ref Range    INTERPRETATION Note    DIABETES PATIENT EDUCATION   Result Value Ref Range    PDF Image Not applicable        Assessment/Plan:    ICD-10-CM ICD-9-CM    1. Type II or unspecified type diabetes mellitus with neurological manifestations, uncontrolled(250.62) ([facility])  E11.49 250.62    2. Hypercholesteremia  E78.00 272.0    3. Essential hypertension  I10 401.9      No orders of the defined types were placed in this encounter. lose weight, increase physical activity, follow low fat diet, follow low salt diet, continue present plan, routine labs ordered,Take 81mg aspirin daily  There are no Patient Instructions on file for this visit. Follow-up and Dispositions    · Return in about 3 months (around 4/6/2021), or if symptoms worsen or fail to improve. I have reviewed with the patient details of the assessment and plan and all questions were answered. Relevent patient education was performed. The most recent lab findings were reviewed with the patient.     An After Visit Summary was printed and given to the patient.

## 2021-01-06 NOTE — PROGRESS NOTES
Chief Complaint   Patient presents with    Diabetes    Hypertension     3 most recent PHQ Screens 1/6/2021   PHQ Not Done Patient Decline   Little interest or pleasure in doing things Not at all   Feeling down, depressed, irritable, or hopeless Not at all   Total Score PHQ 2 0     1. Have you been to the ER, urgent care clinic since your last visit? Hospitalized since your last visit?no    2. Have you seen or consulted any other health care providers outside of the [address] [place] [address] since your last visit? Include any pap smears or colon screening.  no

## 2021-01-07 LAB
ALBUMIN SERPL-MCNC: 4.4 G/DL (ref 3.5–5)
ALBUMIN/GLOB SERPL: 1.1 {RATIO} (ref 1.1–2.2)
ALP SERPL-CCNC: 74 U/L (ref 45–117)
ALT SERPL-CCNC: 43 U/L (ref 12–78)
ANION GAP SERPL CALC-SCNC: 4 MMOL/L (ref 5–15)
AST SERPL-CCNC: 67 U/L (ref 15–37)
BILIRUB SERPL-MCNC: 0.7 MG/DL (ref 0.2–1)
BUN SERPL-MCNC: 12 MG/DL (ref 6–20)
BUN/CREAT SERPL: 12 (ref 12–20)
CALCIUM SERPL-MCNC: 9.4 MG/DL (ref 8.5–10.1)
CHLORIDE SERPL-SCNC: 105 MMOL/L (ref 97–108)
CO2 SERPL-SCNC: 30 MMOL/L (ref 21–32)
CREAT SERPL-MCNC: 1 MG/DL (ref 0.7–1.3)
ERYTHROCYTE [DISTWIDTH] IN BLOOD BY AUTOMATED COUNT: 12.5 % (ref 11.5–14.5)
GLOBULIN SER CALC-MCNC: 3.9 G/DL (ref 2–4)
GLUCOSE SERPL-MCNC: 163 MG/DL (ref 65–100)
HCT VFR BLD AUTO: 40.5 % (ref 36.6–50.3)
HGB BLD-MCNC: 12.9 G/DL (ref 12.1–17)
MCH RBC QN AUTO: 30.9 PG (ref 26–34)
MCHC RBC AUTO-ENTMCNC: 31.9 G/DL (ref 30–36.5)
MCV RBC AUTO: 96.9 FL (ref 80–99)
NRBC # BLD: 0 K/UL (ref 0–0.01)
NRBC BLD-RTO: 0 PER 100 WBC
PLATELET # BLD AUTO: 196 K/UL (ref 150–400)
PMV BLD AUTO: 11.3 FL (ref 8.9–12.9)
POTASSIUM SERPL-SCNC: 4.2 MMOL/L (ref 3.5–5.1)
PROT SERPL-MCNC: 8.3 G/DL (ref 6.4–8.2)
RBC # BLD AUTO: 4.18 M/UL (ref 4.1–5.7)
SODIUM SERPL-SCNC: 139 MMOL/L (ref 136–145)
WBC # BLD AUTO: 3.8 K/UL (ref 4.1–11.1)

## 2021-01-18 ENCOUNTER — IMMUNIZATION (OUTPATIENT)
Dept: INTERNAL MEDICINE CLINIC | Age: 71
End: 2021-01-18
Payer: MEDICARE

## 2021-01-18 DIAGNOSIS — Z23 ENCOUNTER FOR IMMUNIZATION: Primary | ICD-10-CM

## 2021-01-18 PROCEDURE — 91301 COVID-19, MRNA, LNP-S, PF, 100MCG/0.5ML DOSE(MODERNA): CPT | Performed by: FAMILY MEDICINE

## 2021-01-18 PROCEDURE — 0011A PR IMM ADMN SARSCOV2 100 MCG/0.5 ML 1ST DOSE: CPT | Performed by: FAMILY MEDICINE

## 2021-01-21 RX ORDER — ATORVASTATIN CALCIUM 40 MG/1
TABLET, FILM COATED ORAL
Qty: 90 TAB | Refills: 0 | Status: SHIPPED | OUTPATIENT
Start: 2021-01-21 | End: 2021-04-23

## 2021-02-15 ENCOUNTER — IMMUNIZATION (OUTPATIENT)
Dept: INTERNAL MEDICINE CLINIC | Age: 71
End: 2021-02-15
Payer: MEDICARE

## 2021-02-15 DIAGNOSIS — Z23 ENCOUNTER FOR IMMUNIZATION: Primary | ICD-10-CM

## 2021-02-15 PROCEDURE — 0012A COVID-19, MRNA, LNP-S, PF, 100MCG/0.5ML DOSE(MODERNA): CPT | Performed by: FAMILY MEDICINE

## 2021-02-15 PROCEDURE — 91301 COVID-19, MRNA, LNP-S, PF, 100MCG/0.5ML DOSE(MODERNA): CPT | Performed by: FAMILY MEDICINE

## 2021-04-07 ENCOUNTER — OFFICE VISIT (OUTPATIENT)
Dept: INTERNAL MEDICINE CLINIC | Age: 71
End: 2021-04-07
Payer: MEDICARE

## 2021-04-07 VITALS
HEIGHT: 71 IN | SYSTOLIC BLOOD PRESSURE: 159 MMHG | RESPIRATION RATE: 20 BRPM | OXYGEN SATURATION: 98 % | BODY MASS INDEX: 36.12 KG/M2 | WEIGHT: 258 LBS | HEART RATE: 79 BPM | TEMPERATURE: 98.1 F | DIASTOLIC BLOOD PRESSURE: 91 MMHG

## 2021-04-07 DIAGNOSIS — I10 ESSENTIAL HYPERTENSION: ICD-10-CM

## 2021-04-07 DIAGNOSIS — E78.00 HYPERCHOLESTEREMIA: ICD-10-CM

## 2021-04-07 DIAGNOSIS — E11.49 TYPE II OR UNSPECIFIED TYPE DIABETES MELLITUS WITH NEUROLOGICAL MANIFESTATIONS, UNCONTROLLED(250.62) (HCC): ICD-10-CM

## 2021-04-07 DIAGNOSIS — K92.1 MELENA: Primary | ICD-10-CM

## 2021-04-07 DIAGNOSIS — N30.01 HEMATURIA DUE TO ACUTE CYSTITIS: ICD-10-CM

## 2021-04-07 LAB
BILIRUB UR QL STRIP: NEGATIVE
GLUCOSE POC: 147 MG/DL
GLUCOSE UR-MCNC: NEGATIVE MG/DL
KETONES P FAST UR STRIP-MCNC: NEGATIVE MG/DL
PH UR STRIP: 5.5 [PH] (ref 4.6–8)
PROT UR QL STRIP: NEGATIVE
SP GR UR STRIP: 1.02 (ref 1–1.03)
UA UROBILINOGEN AMB POC: ABNORMAL (ref 0.2–1)
URINALYSIS CLARITY POC: CLEAR
URINALYSIS COLOR POC: YELLOW
URINE BLOOD POC: ABNORMAL
URINE LEUKOCYTES POC: NEGATIVE
URINE NITRITES POC: NEGATIVE

## 2021-04-07 PROCEDURE — 99214 OFFICE O/P EST MOD 30 MIN: CPT | Performed by: INTERNAL MEDICINE

## 2021-04-07 PROCEDURE — G8753 SYS BP > OR = 140: HCPCS | Performed by: INTERNAL MEDICINE

## 2021-04-07 PROCEDURE — G8417 CALC BMI ABV UP PARAM F/U: HCPCS | Performed by: INTERNAL MEDICINE

## 2021-04-07 PROCEDURE — 1101F PT FALLS ASSESS-DOCD LE1/YR: CPT | Performed by: INTERNAL MEDICINE

## 2021-04-07 PROCEDURE — 82962 GLUCOSE BLOOD TEST: CPT | Performed by: INTERNAL MEDICINE

## 2021-04-07 PROCEDURE — G8536 NO DOC ELDER MAL SCRN: HCPCS | Performed by: INTERNAL MEDICINE

## 2021-04-07 PROCEDURE — G8755 DIAS BP > OR = 90: HCPCS | Performed by: INTERNAL MEDICINE

## 2021-04-07 PROCEDURE — 81003 URINALYSIS AUTO W/O SCOPE: CPT | Performed by: INTERNAL MEDICINE

## 2021-04-07 PROCEDURE — 3017F COLORECTAL CA SCREEN DOC REV: CPT | Performed by: INTERNAL MEDICINE

## 2021-04-07 PROCEDURE — 3044F HG A1C LEVEL LT 7.0%: CPT | Performed by: INTERNAL MEDICINE

## 2021-04-07 PROCEDURE — G8427 DOCREV CUR MEDS BY ELIG CLIN: HCPCS | Performed by: INTERNAL MEDICINE

## 2021-04-07 PROCEDURE — G8510 SCR DEP NEG, NO PLAN REQD: HCPCS | Performed by: INTERNAL MEDICINE

## 2021-04-07 PROCEDURE — 2022F DILAT RTA XM EVC RTNOPTHY: CPT | Performed by: INTERNAL MEDICINE

## 2021-04-07 RX ORDER — LEVOFLOXACIN 500 MG/1
500 TABLET, FILM COATED ORAL DAILY
Qty: 10 TAB | Refills: 0 | Status: SHIPPED | OUTPATIENT
Start: 2021-04-07 | End: 2021-04-17

## 2021-04-07 NOTE — PROGRESS NOTES
Arron Schilder is a 79 y.o. male and presents with Blood in Urine, Melena, and Diabetes  . Subjective:  He has recently seen blood in the stools,(Please see review of system)      He states he also  Has recently seen blood in his urine. he has had some mild burning(Please see review of system)        Hypertension Review:  The patient has essential hypertension . Diet and Lifestyle: generally follows a low sodium diet, exercises sporadically  Home BP Monitoring: is not measured at home. Pertinent ROS: taking medications as instructed, no medication side effects noted, no TIA's, no chest pain on exertion, no dyspnea on exertion, no swelling of ankles. Diabetes Mellitus Review:  He has diabetes mellitus. Diabetic ROS - medication compliance: compliant all of the time, diabetic diet compliance: compliant all of the time, home glucose monitoring: is performed. Known diabetic complications: none  Cardiovascular risk factors: family history, dyslipidemia, diabetes mellitus, obesity, hypertension  Current diabetic medications include oral agents/  Eye exam current (within one year): no  Weight trend: stable  Prior visit with dietician: no  Current diet: \"healthy\" diet  in general  Current exercise: walking  Home blood sugar records: trend: stable  Any episodes of hypoglycemia? no      Dyslipidemia Review:  Patient presents for evaluation of lipids. Compliance with treatment thus far has been excellent. A repeat fasting lipid profile was not done. The patient does not use medications that may worsen dyslipidemias (corticosteroids, progestins, anabolic steroids, amiodarone, cyclosporine, olanzapine). The patient exercises some            Review of Systems  Constitutional: negative for fevers, chills, anorexia and weight loss  Eyes:   negative for visual disturbance and irritation  ENT:   negative for tinnitus,sore throat,nasal congestion,ear pains. hoarseness  Respiratory:  negative for cough, hemoptysis, dyspnea,wheezing  CV:   negative for chest pain, palpitations, lower extremity edema  GI:   negative for nausea, vomiting, diarrhea, abdominal pain  Endo:               negative for polyuria,polydipsia,polyphagia,heat intolerance  Genitourinary: negative for frequency  Integument:  negative for rash and pruritus  Hematologic:  negative for easy bruising and gum/nose bleeding  Musculoskel: negative for myalgias, arthralgias, back pain, muscle weakness, joint pain  Neurological:  negative for headaches, dizziness, vertigo, memory problems and gait   Behavl/Psych: negative for feelings of anxiety, depression, mood changes    Past Medical History:   Diagnosis Date    Diabetes ([facility] [address]      Past Surgical History:   Procedure Laterality Date    HX ORTHOPAEDIC  2012    lumbar    HX PROSTATECTOMY  2012    HX UROLOGICAL  2012     Social History     Socioeconomic History    Marital status: SINGLE     Spouse name: Not on file    Number of children: 2    Years of education: Not on file    Highest education level: Not on file   Tobacco Use    Smoking status: Never Smoker    Smokeless tobacco: Never Used   Substance and Sexual Activity    Alcohol use: No    Drug use: No   Other Topics Concern     Service No    Blood Transfusions No    Caffeine Concern No    Occupational Exposure No    Hobby Hazards No    Sleep Concern No    Stress Concern No    Weight Concern No    Special Diet No    Back Care No    Exercise Yes    Bike Helmet No    Seat Belt No    Self-Exams No     History reviewed. No pertinent family history. Current Outpatient Medications   Medication Sig Dispense Refill    atorvastatin (LIPITOR) 40 mg tablet Take 1 tablet by mouth once daily 90 Tab 0    metFORMIN (GLUCOPHAGE) 1,000 mg tablet TAKE 1 TABLET BY MOUTH TWICE DAILY WITH  A  MEAL (Patient taking differently: daily.  TAKE 1 TABLET BY MOUTH TWICE DAILY WITH  A  MEAL) 180 Tab 3    glipiZIDE (GLUCOTROL) 10 mg tablet TAKE 1 TABLET BY MOUTH TWICE DAILY (Patient taking differently: 10 mg daily. ) 180 Tab 3    SITagliptin (JANUVIA) 100 mg tablet Take 1 Tab by mouth daily. 90 Tab 3    cefdinir (OMNICEF) 300 mg capsule Take 1 Cap by mouth two (2) times a day. 14 Cap 0    aspirin delayed-release 81 mg tablet Take  by mouth daily.  Lactobacillus acidophilus (PROBIOTIC PO) Take  by mouth. No Known Allergies    Objective:  Visit Vitals  BP (!) 159/91 (BP 1 Location: Right arm, BP Patient Position: Sitting, BP Cuff Size: Adult)   Pulse 79   Temp 98.1 °F (36.7 °C) (Oral)   Resp 20   Ht 5' 11\" (1.803 m)   Wt 258 lb (117 kg)   SpO2 98%   BMI 35.98 kg/m²     Physical Exam:   General appearance - alert, well appearing, and in no distress  Mental status - alert, oriented to person, place, and time  EYE-DAVIAN, EOMI, corneas normal, no foreign bodies  ENT-ENT exam normal, no neck nodes or sinus tenderness  Nose - normal and patent, no erythema, discharge or polyps  Mouth - mucous membranes moist, pharynx normal without lesions  Neck - supple, no significant adenopathy   Chest - clear to auscultation, no wheezes, rales or rhonchi, symmetric air entry   Heart - normal rate, regular rhythm, normal S1, S2, no murmurs, rubs, clicks or gallops   Abdomen - soft, nontender, nondistended, no masses or organomegaly  Lymph- no adenopathy palpable  Ext-peripheral pulses normal, no pedal edema, no clubbing or cyanosis  Skin-Warm and dry.  no hyperpigmentation, vitiligo, or suspicious lesions  Neuro -alert, oriented, normal speech, no focal findings or movement disorder noted  Neck-normal C-spine, no tenderness, full ROM without pain  Feet-no nail deformities or callus formation with good pulses noted      Results for orders placed or performed in visit on 04/59/51   METABOLIC PANEL, COMPREHENSIVE   Result Value Ref Range    Sodium 139 136 - 145 mmol/L    Potassium 4.2 3.5 - 5.1 mmol/L    Chloride 105 97 - 108 mmol/L    CO2 30 21 - 32 mmol/L    Anion gap 4 (L) 5 - 15 mmol/L    Glucose 163 (H) 65 - 100 mg/dL    BUN 12 6 - 20 MG/DL    Creatinine 1.00 0.70 - 1.30 MG/DL    BUN/Creatinine ratio 12 12 - 20      GFR est AA >60 >60 ml/min/1.73m2    GFR est non-AA >60 >60 ml/min/1.73m2    Calcium 9.4 8.5 - 10.1 MG/DL    Bilirubin, total 0.7 0.2 - 1.0 MG/DL    ALT (SGPT) 43 12 - 78 U/L    AST (SGOT) 67 (H) 15 - 37 U/L    Alk. phosphatase 74 45 - 117 U/L    Protein, total 8.3 (H) 6.4 - 8.2 g/dL    Albumin 4.4 3.5 - 5.0 g/dL    Globulin 3.9 2.0 - 4.0 g/dL    A-G Ratio 1.1 1.1 - 2.2     CBC W/O DIFF   Result Value Ref Range    WBC 3.8 (L) 4.1 - 11.1 K/uL    RBC 4.18 4.10 - 5.70 M/uL    HGB 12.9 12.1 - 17.0 g/dL    HCT 40.5 36.6 - 50.3 %    MCV 96.9 80.0 - 99.0 FL    MCH 30.9 26.0 - 34.0 PG    MCHC 31.9 30.0 - 36.5 g/dL    RDW 12.5 11.5 - 14.5 %    PLATELET 811 725 - 644 K/uL    MPV 11.3 8.9 - 12.9 FL    NRBC 0.0 0  WBC    ABSOLUTE NRBC 0.00 0.00 - 0.01 K/uL   AMB POC GLUCOSE BLOOD, BY GLUCOSE MONITORING DEVICE   Result Value Ref Range    Glucose  mg/dL   AMB POC HEMOGLOBIN A1C   Result Value Ref Range    Hemoglobin A1c (POC) 6.7 %   AMB POC LIPID PROFILE   Result Value Ref Range    Cholesterol (POC) 165     Triglycerides (POC) 50     HDL Cholesterol (POC) 70     LDL Cholesterol (POC) 84 MG/DL    Non-HDL Goal (POC) 94     TChol/HDL Ratio (POC) 2.3        Assessment/Plan:  No diagnosis found. No orders of the defined types were placed in this encounter. lose weight, increase physical activity, follow low fat diet, follow low salt diet, continue present plan, routine labs ordered,Take 81mg aspirin daily  There are no Patient Instructions on file for this visit. I have reviewed with the patient details of the assessment and plan and all questions were answered. Relevent patient education was performed. The most recent lab findings were reviewed with the patient. An After Visit Summary was printed and given to the patient.

## 2021-04-07 NOTE — PATIENT INSTRUCTIONS
C[employer]p Activation    Thank you for requesting access to [employer]. Please follow the instructions below to securely access and download your online medical record. [employer]p allows you to send messages to your doctor, view your test results, renew your prescriptions, schedule appointments, and more. How Do I Sign Up? 1. In your internet browser, go to [email or web address]  2. Click on the First Time User? Click Here link in the Sign In box. You will be redirect to the New Member Sign Up page. 3. Enter your Crisp Access Code exactly as it appears below. You will not need to use this code after youve completed the sign-up process. If you do not sign up before the expiration date, you must request a new code. Crisp Access Code: [email or web address]  Expires: 2021  8:03 AM (This is the date your Crisp access code will )    4. Enter the last four digits of your Social Security Number (xxxx) and Date of Birth (mm/dd/yyyy) as indicated and click Submit. You will be taken to the next sign-up page. 5. Create a Crisp ID. This will be your Crisp login ID and cannot be changed, so think of one that is secure and easy to remember. 6. Create a Crisp password. You can change your password at any time. 7. Enter your Password Reset Question and Answer. This can be used at a later time if you forget your password. 8. Enter your e-mail address. You will receive e-mail notification when new information is available in [address]. 9. Click Sign Up. You can now view and download portions of your medical record. 10. Click the Download Summary menu link to download a portable copy of your medical information. Additional Information    If you have questions, please visit the Frequently Asked Questions section of the [employer]p website at [email or web address] Remember, Crisp is NOT to be used for urgent needs. For medical emergencies, dial 911.

## 2021-04-07 NOTE — PROGRESS NOTES
Chief Complaint   Patient presents with    Blood in Urine    Melena    Diabetes       3 most recent [facility] 36 Screens 4/7/2021   [facility] 36 Not Done Patient Decline   Little interest or pleasure in doing things Not at all   Feeling down, depressed, irritable, or hopeless Not at all   Total Score PHQ 2 0     1. Have you been to the ER, urgent care clinic since your last visit? Hospitalized since your last visit?no    2. Have you seen or consulted any other health care providers outside of the [address] [place] [address] since your last visit? Include any pap smears or colon screening.  no

## 2021-04-15 ENCOUNTER — OFFICE VISIT (OUTPATIENT)
Dept: INTERNAL MEDICINE CLINIC | Age: 71
End: 2021-04-15
Payer: MEDICARE

## 2021-04-15 VITALS
OXYGEN SATURATION: 97 % | TEMPERATURE: 98.2 F | HEIGHT: 71 IN | BODY MASS INDEX: 35.98 KG/M2 | HEART RATE: 66 BPM | DIASTOLIC BLOOD PRESSURE: 76 MMHG | SYSTOLIC BLOOD PRESSURE: 110 MMHG | WEIGHT: 257 LBS | RESPIRATION RATE: 16 BRPM

## 2021-04-15 DIAGNOSIS — I10 ESSENTIAL HYPERTENSION: ICD-10-CM

## 2021-04-15 DIAGNOSIS — E78.00 HYPERCHOLESTEREMIA: ICD-10-CM

## 2021-04-15 DIAGNOSIS — N30.90 CYSTITIS: ICD-10-CM

## 2021-04-15 DIAGNOSIS — K92.1 MELENA: Primary | ICD-10-CM

## 2021-04-15 PROCEDURE — G8417 CALC BMI ABV UP PARAM F/U: HCPCS | Performed by: INTERNAL MEDICINE

## 2021-04-15 PROCEDURE — G8510 SCR DEP NEG, NO PLAN REQD: HCPCS | Performed by: INTERNAL MEDICINE

## 2021-04-15 PROCEDURE — G8536 NO DOC ELDER MAL SCRN: HCPCS | Performed by: INTERNAL MEDICINE

## 2021-04-15 PROCEDURE — G8754 DIAS BP LESS 90: HCPCS | Performed by: INTERNAL MEDICINE

## 2021-04-15 PROCEDURE — 99214 OFFICE O/P EST MOD 30 MIN: CPT | Performed by: INTERNAL MEDICINE

## 2021-04-15 PROCEDURE — 1101F PT FALLS ASSESS-DOCD LE1/YR: CPT | Performed by: INTERNAL MEDICINE

## 2021-04-15 PROCEDURE — G8752 SYS BP LESS 140: HCPCS | Performed by: INTERNAL MEDICINE

## 2021-04-15 PROCEDURE — G8427 DOCREV CUR MEDS BY ELIG CLIN: HCPCS | Performed by: INTERNAL MEDICINE

## 2021-04-15 PROCEDURE — 3017F COLORECTAL CA SCREEN DOC REV: CPT | Performed by: INTERNAL MEDICINE

## 2021-04-15 NOTE — PROGRESS NOTES
Gorge Barrera is a 79 y.o. male and presents with Bladder Infection  . Subjective:  He has not recently seen blood in the stools but he has had blood in the stools in the past    He states he not recently seen blood in his urine. he has no had some mild burning      Hypertension Review:  The patient has essential hypertension . Diet and Lifestyle: generally follows a low sodium diet, exercises sporadically  Home BP Monitoring: is not measured at home. Pertinent ROS: taking medications as instructed, no medication side effects noted, no TIA's, no chest pain on exertion, no dyspnea on exertion, no swelling of ankles. Diabetes Mellitus Review:  He has diabetes mellitus. Diabetic ROS - medication compliance: compliant all of the time, diabetic diet compliance: compliant all of the time, home glucose monitoring: is performed. Known diabetic complications: none  Cardiovascular risk factors: family history, dyslipidemia, diabetes mellitus, obesity, hypertension  Current diabetic medications include oral agents/  Eye exam current (within one year): no  Weight trend: stable  Prior visit with dietician: no  Current diet: \"healthy\" diet  in general  Current exercise: walking  Home blood sugar records: trend: stable  Any episodes of hypoglycemia? no      Dyslipidemia Review:  Patient presents for evaluation of lipids. Compliance with treatment thus far has been excellent. A repeat fasting lipid profile was not done. The patient does not use medications that may worsen dyslipidemias (corticosteroids, progestins, anabolic steroids, amiodarone, cyclosporine, olanzapine). The patient exercises some            Review of Systems  Constitutional: negative for fevers, chills, anorexia and weight loss  Eyes:   negative for visual disturbance and irritation  ENT:   negative for tinnitus,sore throat,nasal congestion,ear pains. hoarseness  Respiratory:  negative for cough, hemoptysis, dyspnea,wheezing  CV:   negative for chest pain, palpitations, lower extremity edema  GI:   negative for nausea, vomiting, diarrhea, abdominal pain  Endo:               negative for polyuria,polydipsia,polyphagia,heat intolerance  Genitourinary: negative for frequency  Integument:  negative for rash and pruritus  Hematologic:  negative for easy bruising and gum/nose bleeding  Musculoskel: negative for myalgias, arthralgias, back pain, muscle weakness, joint pain  Neurological:  negative for headaches, dizziness, vertigo, memory problems and gait   Behavl/Psych: negative for feelings of anxiety, depression, mood changes    Past Medical History:   Diagnosis Date    Diabetes ([facility] [address]      Past Surgical History:   Procedure Laterality Date    HX ORTHOPAEDIC  2012    lumbar    HX PROSTATECTOMY  2012    HX UROLOGICAL  2012     Social History     Socioeconomic History    Marital status: SINGLE     Spouse name: Not on file    Number of children: 2    Years of education: Not on file    Highest education level: Not on file   Tobacco Use    Smoking status: Never Smoker    Smokeless tobacco: Never Used   Substance and Sexual Activity    Alcohol use: No    Drug use: No   Other Topics Concern     Service No    Blood Transfusions No    Caffeine Concern No    Occupational Exposure No    Hobby Hazards No    Sleep Concern No    Stress Concern No    Weight Concern No    Special Diet No    Back Care No    Exercise Yes    Bike Helmet No    Seat Belt No    Self-Exams No     History reviewed. No pertinent family history. Current Outpatient Medications   Medication Sig Dispense Refill    levoFLOXacin (LEVAQUIN) 500 mg tablet Take 1 Tab by mouth daily for 10 days. 10 Tab 0    atorvastatin (LIPITOR) 40 mg tablet Take 1 tablet by mouth once daily 90 Tab 0    metFORMIN (GLUCOPHAGE) 1,000 mg tablet TAKE 1 TABLET BY MOUTH TWICE DAILY WITH  A  MEAL (Patient taking differently: daily.  TAKE 1 TABLET BY MOUTH TWICE DAILY WITH  A  MEAL) 180 Tab 3    glipiZIDE (GLUCOTROL) 10 mg tablet TAKE 1 TABLET BY MOUTH TWICE DAILY (Patient taking differently: 10 mg daily. ) 180 Tab 3    SITagliptin (JANUVIA) 100 mg tablet Take 1 Tab by mouth daily. 90 Tab 3    cefdinir (OMNICEF) 300 mg capsule Take 1 Cap by mouth two (2) times a day. 14 Cap 0    aspirin delayed-release 81 mg tablet Take  by mouth daily.  Lactobacillus acidophilus (PROBIOTIC PO) Take  by mouth. No Known Allergies    Objective:  Visit Vitals  /76   Pulse 66   Temp 98.2 °F (36.8 °C) (Oral)   Resp 16   Ht 5' 11\" (1.803 m)   Wt 257 lb (116.6 kg)   SpO2 97%   BMI 35.84 kg/m²     Physical Exam:   General appearance - alert, well appearing, and in no distress  Mental status - alert, oriented to person, place, and time  EYE-DAVIAN, EOMI, corneas normal, no foreign bodies  ENT-ENT exam normal, no neck nodes or sinus tenderness  Nose - normal and patent, no erythema, discharge or polyps  Mouth - mucous membranes moist, pharynx normal without lesions  Neck - supple, no significant adenopathy   Chest - clear to auscultation, no wheezes, rales or rhonchi, symmetric air entry   Heart - normal rate, regular rhythm, normal S1, S2, no murmurs, rubs, clicks or gallops   Abdomen - soft, nontender, nondistended, no masses or organomegaly  Lymph- no adenopathy palpable  Ext-peripheral pulses normal, no pedal edema, no clubbing or cyanosis  Skin-Warm and dry.  no hyperpigmentation, vitiligo, or suspicious lesions  Neuro -alert, oriented, normal speech, no focal findings or movement disorder noted  Neck-normal C-spine, no tenderness, full ROM without pain  Feet-no nail deformities or callus formation with good pulses noted      Results for orders placed or performed in visit on 04/07/21   AMB POC URINALYSIS DIP STICK AUTO W/O MICRO   Result Value Ref Range    Color (UA POC) Yellow     Clarity (UA POC) Clear     Glucose (UA POC) Negative Negative    Bilirubin (UA POC) Negative Negative    Ketones (UA POC) Negative Negative    Specific gravity (UA POC) 1.025 1.001 - 1.035    Blood (UA POC) 4+ Negative    pH (UA POC) 5.5 4.6 - 8.0    Protein (UA POC) Negative Negative    Urobilinogen (UA POC) 0.2 mg/dL 0.2 - 1    Nitrites (UA POC) Negative Negative    Leukocyte esterase (UA POC) Negative Negative   AMB POC GLUCOSE BLOOD, BY GLUCOSE MONITORING DEVICE   Result Value Ref Range    Glucose  MG/DL       Assessment/Plan:    ICD-10-CM ICD-9-CM    1. Melena  K92.1 578.1 REFERRAL TO GASTROENTEROLOGY   2. Cystitis  N30.90 595.9    3. Essential hypertension  I10 401.9    4. Hypercholesteremia  E78.00 272.0      Orders Placed This Encounter    REFERRAL TO GASTROENTEROLOGY     Referral Priority:   Routine     Referral Type:   Consultation     Referral Reason:   Specialty Services Required     Referred to Provider:   Alicia Rios MD     Number of Visits Requested:   1     lose weight, increase physical activity, follow low fat diet, follow low salt diet, continue present plan, routine labs ordered,Take 81mg aspirin daily  Patient Instructions   NeofonieharRFMarq Activation    Thank you for requesting access to Vehrity. Please follow the instructions below to securely access and download your online medical record. Vehrity allows you to send messages to your doctor, view your test results, renew your prescriptions, schedule appointments, and more. How Do I Sign Up? 1. In your internet browser, go to [email or web address]  2. Click on the First Time User? Click Here link in the Sign In box. You will be redirect to the New Member Sign Up page. 3. Enter your Vehrity Access Code exactly as it appears below. You will not need to use this code after youve completed the sign-up process. If you do not sign up before the expiration date, you must request a new code. Vehrity Access Code: [email or web address]  Expires: 2021  8:03 AM (This is the date your Vehrity access code will )    4.  Enter the last four digits of your Social Security Number (xxxx) and Date of Birth (mm/dd/yyyy) as indicated and click Submit. You will be taken to the next sign-up page. 5. Create a Sophia Learningt ID. This will be your Vobile login ID and cannot be changed, so think of one that is secure and easy to remember. 6. Create a Vobile password. You can change your password at any time. 7. Enter your Password Reset Question and Answer. This can be used at a later time if you forget your password. 8. Enter your e-mail address. You will receive e-mail notification when new information is available in [address] 9. Click Sign Up. You can now view and download portions of your medical record. 10. Click the Download Summary menu link to download a portable copy of your medical information. Additional Information    If you have questions, please visit the Frequently Asked Questions section of the Vobile website at [email or web address] Remember, Vobile is NOT to be used for urgent needs. For medical emergencies, dial 911. Follow-up and Dispositions    · Return in about 3 months (around 7/15/2021), or if symptoms worsen or fail to improve. I have reviewed with the patient details of the assessment and plan and all questions were answered. Relevent patient education was performed. The most recent lab findings were reviewed with the patient. An After Visit Summary was printed and given to the patient.

## 2021-04-15 NOTE — PROGRESS NOTES
1. Have you been to the ER, urgent care clinic since your last visit? Hospitalized since your last visit?no    2. Have you seen or consulted any other health care providers outside of the [address] [place] [address] since your last visit? Include any pap smears or colon screening.  No    Chief Complaint   Patient presents with    Bladder Infection

## 2021-04-15 NOTE — PATIENT INSTRUCTIONS
WorldTV Activation    Thank you for requesting access to WorldTV. Please follow the instructions below to securely access and download your online medical record. WorldTV allows you to send messages to your doctor, view your test results, renew your prescriptions, schedule appointments, and more. How Do I Sign Up? 1. In your internet browser, go to [email or web address]  2. Click on the First Time User? Click Here link in the Sign In box. You will be redirect to the New Member Sign Up page. 3. Enter your WorldTV Access Code exactly as it appears below. You will not need to use this code after youve completed the sign-up process. If you do not sign up before the expiration date, you must request a new code. WorldTV Access Code: [email or web address]  Expires: 2021  8:03 AM (This is the date your WorldTV access code will )    4. Enter the last four digits of your Social Security Number (xxxx) and Date of Birth (mm/dd/yyyy) as indicated and click Submit. You will be taken to the next sign-up page. 5. Create a WorldTV ID. This will be your WorldTV login ID and cannot be changed, so think of one that is secure and easy to remember. 6. Create a WorldTV password. You can change your password at any time. 7. Enter your Password Reset Question and Answer. This can be used at a later time if you forget your password. 8. Enter your e-mail address. You will receive e-mail notification when new information is available in [address] 9. Click Sign Up. You can now view and download portions of your medical record. 10. Click the Download Summary menu link to download a portable copy of your medical information. Additional Information    If you have questions, please visit the Frequently Asked Questions section of the WorldTV website at [email or web address] Remember, WorldTV is NOT to be used for urgent needs. For medical emergencies, dial 911.

## 2021-04-23 RX ORDER — ATORVASTATIN CALCIUM 40 MG/1
TABLET, FILM COATED ORAL
Qty: 90 TAB | Refills: 0 | Status: SHIPPED | OUTPATIENT
Start: 2021-04-23 | End: 2021-08-16

## 2021-05-03 ENCOUNTER — OFFICE VISIT (OUTPATIENT)
Dept: INTERNAL MEDICINE CLINIC | Age: 71
End: 2021-05-03
Payer: MEDICARE

## 2021-05-03 VITALS
TEMPERATURE: 97.9 F | HEIGHT: 71 IN | HEART RATE: 74 BPM | OXYGEN SATURATION: 93 % | BODY MASS INDEX: 36.4 KG/M2 | RESPIRATION RATE: 16 BRPM | DIASTOLIC BLOOD PRESSURE: 84 MMHG | WEIGHT: 260 LBS | SYSTOLIC BLOOD PRESSURE: 130 MMHG

## 2021-05-03 DIAGNOSIS — N30.01 HEMATURIA DUE TO ACUTE CYSTITIS: Primary | ICD-10-CM

## 2021-05-03 DIAGNOSIS — I10 ESSENTIAL HYPERTENSION: ICD-10-CM

## 2021-05-03 DIAGNOSIS — E78.00 HYPERCHOLESTEREMIA: ICD-10-CM

## 2021-05-03 DIAGNOSIS — E11.9 TYPE 2 DIABETES MELLITUS WITHOUT COMPLICATION, WITHOUT LONG-TERM CURRENT USE OF INSULIN (HCC): ICD-10-CM

## 2021-05-03 LAB
BILIRUB UR QL STRIP: NEGATIVE
GLUCOSE UR-MCNC: NEGATIVE MG/DL
KETONES P FAST UR STRIP-MCNC: NEGATIVE MG/DL
PH UR STRIP: 5.5 [PH] (ref 4.6–8)
PROT UR QL STRIP: NEGATIVE
SP GR UR STRIP: 1.01 (ref 1–1.03)
UA UROBILINOGEN AMB POC: NORMAL (ref 0.2–1)
URINALYSIS CLARITY POC: CLEAR
URINALYSIS COLOR POC: YELLOW
URINE BLOOD POC: NEGATIVE
URINE LEUKOCYTES POC: NEGATIVE
URINE NITRITES POC: NEGATIVE

## 2021-05-03 PROCEDURE — 3017F COLORECTAL CA SCREEN DOC REV: CPT | Performed by: INTERNAL MEDICINE

## 2021-05-03 PROCEDURE — G8752 SYS BP LESS 140: HCPCS | Performed by: INTERNAL MEDICINE

## 2021-05-03 PROCEDURE — G8417 CALC BMI ABV UP PARAM F/U: HCPCS | Performed by: INTERNAL MEDICINE

## 2021-05-03 PROCEDURE — 81003 URINALYSIS AUTO W/O SCOPE: CPT | Performed by: INTERNAL MEDICINE

## 2021-05-03 PROCEDURE — G8427 DOCREV CUR MEDS BY ELIG CLIN: HCPCS | Performed by: INTERNAL MEDICINE

## 2021-05-03 PROCEDURE — G8754 DIAS BP LESS 90: HCPCS | Performed by: INTERNAL MEDICINE

## 2021-05-03 PROCEDURE — 99213 OFFICE O/P EST LOW 20 MIN: CPT | Performed by: INTERNAL MEDICINE

## 2021-05-03 PROCEDURE — 2022F DILAT RTA XM EVC RTNOPTHY: CPT | Performed by: INTERNAL MEDICINE

## 2021-05-03 PROCEDURE — G8536 NO DOC ELDER MAL SCRN: HCPCS | Performed by: INTERNAL MEDICINE

## 2021-05-03 PROCEDURE — 1101F PT FALLS ASSESS-DOCD LE1/YR: CPT | Performed by: INTERNAL MEDICINE

## 2021-05-03 PROCEDURE — G8432 DEP SCR NOT DOC, RNG: HCPCS | Performed by: INTERNAL MEDICINE

## 2021-05-03 PROCEDURE — 3044F HG A1C LEVEL LT 7.0%: CPT | Performed by: INTERNAL MEDICINE

## 2021-05-03 NOTE — PATIENT INSTRUCTIONS
Point.io Activation    Thank you for requesting access to Point.io. Please follow the instructions below to securely access and download your online medical record. Point.io allows you to send messages to your doctor, view your test results, renew your prescriptions, schedule appointments, and more. How Do I Sign Up? 1. In your internet browser, go to [email or web address]  2. Click on the First Time User? Click Here link in the Sign In box. You will be redirect to the New Member Sign Up page. 3. Enter your Point.io Access Code exactly as it appears below. You will not need to use this code after youve completed the sign-up process. If you do not sign up before the expiration date, you must request a new code. Point.io Access Code: [email or web address]  Expires: 2021  8:03 AM (This is the date your Point.io access code will )    4. Enter the last four digits of your Social Security Number (xxxx) and Date of Birth (mm/dd/yyyy) as indicated and click Submit. You will be taken to the next sign-up page. 5. Create a Point.io ID. This will be your Point.io login ID and cannot be changed, so think of one that is secure and easy to remember. 6. Create a Point.io password. You can change your password at any time. 7. Enter your Password Reset Question and Answer. This can be used at a later time if you forget your password. 8. Enter your e-mail address. You will receive e-mail notification when new information is available in [address] 9. Click Sign Up. You can now view and download portions of your medical record. 10. Click the Download Summary menu link to download a portable copy of your medical information. Additional Information    If you have questions, please visit the Frequently Asked Questions section of the Point.[email or web address] website at [email or web address] Remember, Point.io is NOT to be used for urgent needs. For medical emergencies, dial 911.

## 2021-05-03 NOTE — PROGRESS NOTES
1. Have you been to the ER, urgent care clinic since your last visit? Hospitalized since your last visit? NO    2. Have you seen or consulted any other health care providers outside of the [address] [place] [address] since your last visit? Include any pap smears or colon screening.  NO    Chief Complaint   Patient presents with    Blood in Urine

## 2021-05-03 NOTE — PROGRESS NOTES
Armando Francis is a 79 y.o. male and presents with Blood in Urine  . Subjective:  He has not recently seen blood in the stools      Hypertension Review:  The patient has essential hypertension . Diet and Lifestyle: generally follows a low sodium diet, exercises sporadically  Home BP Monitoring: is not measured at home. Pertinent ROS: taking medications as instructed, no medication side effects noted, no TIA's, no chest pain on exertion, no dyspnea on exertion, no swelling of ankles. Diabetes Mellitus Review:  He has diabetes mellitus. Diabetic ROS - medication compliance: compliant all of the time, diabetic diet compliance: compliant all of the time, home glucose monitoring: is performed. Known diabetic complications: none  Cardiovascular risk factors: family history, dyslipidemia, diabetes mellitus, obesity, hypertension  Current diabetic medications include oral agents/  Eye exam current (within one year): no  Weight trend: stable  Prior visit with dietician: no  Current diet: \"healthy\" diet  in general  Current exercise: walking  Home blood sugar records: trend: stable  Any episodes of hypoglycemia? no      Dyslipidemia Review:  Patient presents for evaluation of lipids. Compliance with treatment thus far has been excellent. A repeat fasting lipid profile was not done. The patient does not use medications that may worsen dyslipidemias (corticosteroids, progestins, anabolic steroids, amiodarone, cyclosporine, olanzapine). The patient exercises some            Review of Systems  Constitutional: negative for fevers, chills, anorexia and weight loss  Eyes:   negative for visual disturbance and irritation  ENT:   negative for tinnitus,sore throat,nasal congestion,ear pains. hoarseness  Respiratory:  negative for cough, hemoptysis, dyspnea,wheezing  CV:   negative for chest pain, palpitations, lower extremity edema  GI:   negative for nausea, vomiting, diarrhea, abdominal pain  Endo:               negative for polyuria,polydipsia,polyphagia,heat intolerance  Genitourinary: negative for frequency  Integument:  negative for rash and pruritus  Hematologic:  negative for easy bruising and gum/nose bleeding  Musculoskel: negative for myalgias, arthralgias, back pain, muscle weakness, joint pain  Neurological:  negative for headaches, dizziness, vertigo, memory problems and gait   Behavl/Psych: negative for feelings of anxiety, depression, mood changes    Past Medical History:   Diagnosis Date    Diabetes [facility])      Past Surgical History:   Procedure Laterality Date    HX ORTHOPAEDIC  2012    lumbar    HX PROSTATECTOMY  2012    HX UROLOGICAL  2012     Social History     Socioeconomic History    Marital status: SINGLE     Spouse name: Not on file    Number of children: 2    Years of education: Not on file    Highest education level: Not on file   Tobacco Use    Smoking status: Never Smoker    Smokeless tobacco: Never Used   Substance and Sexual Activity    Alcohol use: No    Drug use: No   Other Topics Concern     Service No    Blood Transfusions No    Caffeine Concern No    Occupational Exposure No    Hobby Hazards No    Sleep Concern No    Stress Concern No    Weight Concern No    Special Diet No    Back Care No    Exercise Yes    Bike Helmet No    Seat Belt No    Self-Exams No     History reviewed. No pertinent family history. Current Outpatient Medications   Medication Sig Dispense Refill    atorvastatin (LIPITOR) 40 mg tablet Take 1 tablet by mouth once daily 90 Tab 0    metFORMIN (GLUCOPHAGE) 1,000 mg tablet TAKE 1 TABLET BY MOUTH TWICE DAILY WITH  A  MEAL (Patient taking differently: daily. TAKE 1 TABLET BY MOUTH TWICE DAILY WITH  A  MEAL) 180 Tab 3    glipiZIDE (GLUCOTROL) 10 mg tablet TAKE 1 TABLET BY MOUTH TWICE DAILY (Patient taking differently: 10 mg daily. ) 180 Tab 3    SITagliptin (JANUVIA) 100 mg tablet Take 1 Tab by mouth daily.  90 Tab 3    cefdinir (OMNICEF) 300 mg capsule Take 1 Cap by mouth two (2) times a day. 14 Cap 0    aspirin delayed-release 81 mg tablet Take  by mouth daily.  Lactobacillus acidophilus (PROBIOTIC PO) Take  by mouth. No Known Allergies    Objective:  Visit Vitals  /84   Pulse 74   Temp 97.9 °F (36.6 °C) (Oral)   Resp 16   Ht 5' 11\" (1.803 m)   Wt 260 lb (117.9 kg)   SpO2 93%   BMI 36.26 kg/m²     Physical Exam:   General appearance - alert, well appearing, and in no distress  Mental status - alert, oriented to person, place, and time  EYE-DAVIAN, EOMI, corneas normal, no foreign bodies  ENT-ENT exam normal, no neck nodes or sinus tenderness  Nose - normal and patent, no erythema, discharge or polyps  Mouth - mucous membranes moist, pharynx normal without lesions  Neck - supple, no significant adenopathy   Chest - clear to auscultation, no wheezes, rales or rhonchi, symmetric air entry   Heart - normal rate, regular rhythm, normal S1, S2, no murmurs, rubs, clicks or gallops   Abdomen - soft, nontender, nondistended, no masses or organomegaly  Lymph- no adenopathy palpable  Ext-peripheral pulses normal, no pedal edema, no clubbing or cyanosis  Skin-Warm and dry.  no hyperpigmentation, vitiligo, or suspicious lesions  Neuro -alert, oriented, normal speech, no focal findings or movement disorder noted  Neck-normal C-spine, no tenderness, full ROM without pain  Feet-no nail deformities or callus formation with good pulses noted      Results for orders placed or performed in visit on 05/03/21   AMB POC URINALYSIS DIP STICK AUTO W/O MICRO   Result Value Ref Range    Color (UA POC) Yellow     Clarity (UA POC) Clear     Glucose (UA POC) Negative Negative    Bilirubin (UA POC) Negative Negative    Ketones (UA POC) Negative Negative    Specific gravity (UA POC) 1.010 1.001 - 1.035    Blood (UA POC) Negative Negative    pH (UA POC) 5.5 4.6 - 8.0    Protein (UA POC) Negative Negative    Urobilinogen (UA POC) 0.2 mg/dL 0.2 - 1    Nitrites (UA POC) Negative Negative    Leukocyte esterase (UA POC) Negative Negative       Assessment/Plan:    ICD-10-CM ICD-9-CM    1. Hematuria due to acute cystitis  N30.01 599.70 AMB POC URINALYSIS DIP STICK AUTO W/O MICRO     595.0 US RETROPERITONEUM COMP   2. Essential hypertension  I10 401.9    3. Hypercholesteremia  E78.00 272.0    4. Type 2 diabetes mellitus without complication, without long-term current use of insulin (HCC)  E11.9 250.00      Orders Placed This Encounter    US RETROPERITONEUM COMP     Standing Status:   Future     Standing Expiration Date:   6/3/2021    AMB POC URINALYSIS DIP STICK AUTO W/O MICRO     lose weight, increase physical activity, follow low fat diet, follow low salt diet, continue present plan, routine labs ordered,Take 81mg aspirin daily  There are no Patient Instructions on file for this visit. I have reviewed with the patient details of the assessment and plan and all questions were answered. Relevent patient education was performed. The most recent lab findings were reviewed with the patient. An After Visit Summary was printed and given to the patient.

## 2021-05-04 ENCOUNTER — HOSPITAL ENCOUNTER (OUTPATIENT)
Dept: ULTRASOUND IMAGING | Age: 71
Discharge: HOME | End: 2021-05-04
Payer: MEDICARE

## 2021-05-04 DIAGNOSIS — N30.01 HEMATURIA DUE TO ACUTE CYSTITIS: ICD-10-CM

## 2021-05-04 PROCEDURE — 76770 US EXAM ABDO BACK WALL COMP: CPT

## 2021-05-06 LAB
ALBUMIN SERPL-MCNC: 4.4 G/DL (ref 3.8–4.8)
ALBUMIN/CREAT UR: 11 MG/G CREAT (ref 0–29)
ALBUMIN/GLOB SERPL: 1.3 {RATIO} (ref 1.2–2.2)
ALP SERPL-CCNC: 61 IU/L (ref 39–117)
ALT SERPL-CCNC: 22 IU/L (ref 0–44)
AST SERPL-CCNC: 41 IU/L (ref 0–40)
BILIRUB SERPL-MCNC: 0.5 MG/DL (ref 0–1.2)
BUN SERPL-MCNC: 12 MG/DL (ref 8–27)
BUN/CREAT SERPL: 14 (ref 10–24)
CALCIUM SERPL-MCNC: 9.5 MG/DL (ref 8.6–10.2)
CHLORIDE SERPL-SCNC: 104 MMOL/L (ref 96–106)
CHOLEST SERPL-MCNC: 134 MG/DL (ref 100–199)
CO2 SERPL-SCNC: 23 MMOL/L (ref 20–29)
CREAT SERPL-MCNC: 0.86 MG/DL (ref 0.76–1.27)
CREAT UR-MCNC: 164.3 MG/DL
EST. AVERAGE GLUCOSE BLD GHB EST-MCNC: 169 MG/DL
GLOBULIN SER CALC-MCNC: 3.3 G/DL (ref 1.5–4.5)
GLUCOSE SERPL-MCNC: 142 MG/DL (ref 65–99)
HBA1C MFR BLD: 7.5 % (ref 4.8–5.6)
HDLC SERPL-MCNC: 61 MG/DL
IMP & REVIEW OF LAB RESULTS: NORMAL
LDLC SERPL CALC-MCNC: 60 MG/DL (ref 0–99)
MICROALBUMIN UR-MCNC: 17.3 UG/ML
POTASSIUM SERPL-SCNC: 4.6 MMOL/L (ref 3.5–5.2)
PROT SERPL-MCNC: 7.7 G/DL (ref 6–8.5)
SODIUM SERPL-SCNC: 142 MMOL/L (ref 134–144)
TRIGL SERPL-MCNC: 62 MG/DL (ref 0–149)
VLDLC SERPL CALC-MCNC: 13 MG/DL (ref 5–40)

## 2021-05-07 ENCOUNTER — ANESTHESIA EVENT (OUTPATIENT)
Dept: SURGERY | Age: 71
End: 2021-05-07
Payer: MEDICARE

## 2021-05-07 ENCOUNTER — HOSPITAL ENCOUNTER (OUTPATIENT)
Dept: PREADMISSION TESTING | Age: 71
Discharge: HOME | End: 2021-05-07
Payer: MEDICARE

## 2021-05-07 LAB — SARS-COV-2, COV2: NORMAL

## 2021-05-07 PROCEDURE — U0003 INFECTIOUS AGENT DETECTION BY NUCLEIC ACID (DNA OR RNA); SEVERE ACUTE RESPIRATORY SYNDROME CORONAVIRUS 2 (SARS-COV-2) (CORONAVIRUS DISEASE [COVID-19]), AMPLIFIED PROBE TECHNIQUE, MAKING USE OF HIGH THROUGHPUT TECHNOLOGIES AS DESCRIBED BY CMS-2020-01-R: HCPCS

## 2021-05-08 LAB — SARS-COV-2, COV2NT: NOT DETECTED

## 2021-05-11 ENCOUNTER — HOSPITAL ENCOUNTER (OUTPATIENT)
Age: 71
Setting detail: OUTPATIENT SURGERY
Discharge: HOME | End: 2021-05-11
Attending: INTERNAL MEDICINE | Admitting: INTERNAL MEDICINE
Payer: MEDICARE

## 2021-05-11 ENCOUNTER — ANESTHESIA (OUTPATIENT)
Dept: SURGERY | Age: 71
End: 2021-05-11
Payer: MEDICARE

## 2021-05-11 VITALS
BODY MASS INDEX: 35.7 KG/M2 | TEMPERATURE: 97.4 F | OXYGEN SATURATION: 98 % | WEIGHT: 255 LBS | SYSTOLIC BLOOD PRESSURE: 147 MMHG | RESPIRATION RATE: 15 BRPM | HEART RATE: 71 BPM | HEIGHT: 71 IN | DIASTOLIC BLOOD PRESSURE: 96 MMHG

## 2021-05-11 LAB
GLUCOSE BLD STRIP.AUTO-MCNC: 114 MG/DL (ref 65–117)
SERVICE CMNT-IMP: NORMAL

## 2021-05-11 PROCEDURE — 76010000138 HC OR TIME 0.5 TO 1 HR: Performed by: INTERNAL MEDICINE

## 2021-05-11 PROCEDURE — 76210000063 HC OR PH I REC FIRST 0.5 HR: Performed by: INTERNAL MEDICINE

## 2021-05-11 PROCEDURE — 77030013992 HC SNR POLYP ENDOSC BSC -B: Performed by: INTERNAL MEDICINE

## 2021-05-11 PROCEDURE — 74011250636 HC RX REV CODE- 250/636: Performed by: ANESTHESIOLOGY

## 2021-05-11 PROCEDURE — 2709999900 HC NON-CHARGEABLE SUPPLY: Performed by: INTERNAL MEDICINE

## 2021-05-11 PROCEDURE — 76060000032 HC ANESTHESIA 0.5 TO 1 HR: Performed by: INTERNAL MEDICINE

## 2021-05-11 PROCEDURE — 76210000020 HC REC RM PH II FIRST 0.5 HR: Performed by: INTERNAL MEDICINE

## 2021-05-11 PROCEDURE — 77030021593 HC FCPS BIOP ENDOSC BSC -A: Performed by: INTERNAL MEDICINE

## 2021-05-11 PROCEDURE — 88305 TISSUE EXAM BY PATHOLOGIST: CPT

## 2021-05-11 PROCEDURE — 82962 GLUCOSE BLOOD TEST: CPT

## 2021-05-11 RX ORDER — SODIUM CHLORIDE, SODIUM LACTATE, POTASSIUM CHLORIDE, CALCIUM CHLORIDE 600; 310; 30; 20 MG/100ML; MG/100ML; MG/100ML; MG/100ML
INJECTION, SOLUTION INTRAVENOUS
Status: DISCONTINUED | OUTPATIENT
Start: 2021-05-11 | End: 2021-05-11 | Stop reason: HOSPADM

## 2021-05-11 RX ORDER — SODIUM CHLORIDE 0.9 % (FLUSH) 0.9 %
5-40 SYRINGE (ML) INJECTION EVERY 8 HOURS
Status: DISCONTINUED | OUTPATIENT
Start: 2021-05-11 | End: 2021-05-11 | Stop reason: HOSPADM

## 2021-05-11 RX ORDER — SODIUM CHLORIDE 0.9 % (FLUSH) 0.9 %
5-40 SYRINGE (ML) INJECTION AS NEEDED
Status: DISCONTINUED | OUTPATIENT
Start: 2021-05-11 | End: 2021-05-11 | Stop reason: HOSPADM

## 2021-05-11 RX ORDER — SODIUM CHLORIDE, SODIUM LACTATE, POTASSIUM CHLORIDE, CALCIUM CHLORIDE 600; 310; 30; 20 MG/100ML; MG/100ML; MG/100ML; MG/100ML
50 INJECTION, SOLUTION INTRAVENOUS CONTINUOUS
Status: DISCONTINUED | OUTPATIENT
Start: 2021-05-11 | End: 2021-05-11 | Stop reason: HOSPADM

## 2021-05-11 RX ORDER — SODIUM CHLORIDE 9 MG/ML
100 INJECTION, SOLUTION INTRAVENOUS CONTINUOUS
Status: DISCONTINUED | OUTPATIENT
Start: 2021-05-11 | End: 2021-05-11 | Stop reason: HOSPADM

## 2021-05-11 RX ORDER — LIDOCAINE HYDROCHLORIDE 20 MG/ML
5 SOLUTION OROPHARYNGEAL AS NEEDED
Status: DISCONTINUED | OUTPATIENT
Start: 2021-05-11 | End: 2021-05-11 | Stop reason: HOSPADM

## 2021-05-11 RX ORDER — NALOXONE HYDROCHLORIDE 0.4 MG/ML
0.4 INJECTION, SOLUTION INTRAMUSCULAR; INTRAVENOUS; SUBCUTANEOUS
Status: DISCONTINUED | OUTPATIENT
Start: 2021-05-11 | End: 2021-05-11 | Stop reason: HOSPADM

## 2021-05-11 RX ORDER — LIDOCAINE HYDROCHLORIDE 10 MG/ML
0.1 INJECTION, SOLUTION EPIDURAL; INFILTRATION; INTRACAUDAL; PERINEURAL AS NEEDED
Status: DISCONTINUED | OUTPATIENT
Start: 2021-05-11 | End: 2021-05-11 | Stop reason: HOSPADM

## 2021-05-11 RX ORDER — MIDAZOLAM HYDROCHLORIDE 1 MG/ML
5 INJECTION, SOLUTION INTRAMUSCULAR; INTRAVENOUS
Status: DISCONTINUED | OUTPATIENT
Start: 2021-05-11 | End: 2021-05-11 | Stop reason: HOSPADM

## 2021-05-11 RX ORDER — FENTANYL CITRATE 50 UG/ML
100 INJECTION, SOLUTION INTRAMUSCULAR; INTRAVENOUS ONCE
Status: DISCONTINUED | OUTPATIENT
Start: 2021-05-11 | End: 2021-05-11 | Stop reason: HOSPADM

## 2021-05-11 RX ORDER — DEXTROSE MONOHYDRATE AND SODIUM CHLORIDE 5; .9 G/100ML; G/100ML
100 INJECTION, SOLUTION INTRAVENOUS CONTINUOUS
Status: DISCONTINUED | OUTPATIENT
Start: 2021-05-11 | End: 2021-05-11 | Stop reason: HOSPADM

## 2021-05-11 RX ORDER — DEXTROMETHORPHAN/PSEUDOEPHED 2.5-7.5/.8
1.2 DROPS ORAL
Status: DISCONTINUED | OUTPATIENT
Start: 2021-05-11 | End: 2021-05-11 | Stop reason: HOSPADM

## 2021-05-11 RX ORDER — SODIUM CHLORIDE 9 MG/ML
50 INJECTION, SOLUTION INTRAVENOUS CONTINUOUS
Status: DISCONTINUED | OUTPATIENT
Start: 2021-05-11 | End: 2021-05-11 | Stop reason: HOSPADM

## 2021-05-11 RX ORDER — PROPOFOL 10 MG/ML
INJECTION, EMULSION INTRAVENOUS AS NEEDED
Status: DISCONTINUED | OUTPATIENT
Start: 2021-05-11 | End: 2021-05-11 | Stop reason: HOSPADM

## 2021-05-11 RX ORDER — FLUMAZENIL 0.1 MG/ML
0.2 INJECTION INTRAVENOUS
Status: DISCONTINUED | OUTPATIENT
Start: 2021-05-11 | End: 2021-05-11 | Stop reason: HOSPADM

## 2021-05-11 RX ORDER — ATROPINE SULFATE 1 MG/ML
0.5 INJECTION, SOLUTION INTRAVENOUS
Status: DISCONTINUED | OUTPATIENT
Start: 2021-05-11 | End: 2021-05-11 | Stop reason: HOSPADM

## 2021-05-11 RX ORDER — EPINEPHRINE 0.1 MG/ML
1 INJECTION INTRACARDIAC; INTRAVENOUS
Status: DISCONTINUED | OUTPATIENT
Start: 2021-05-11 | End: 2021-05-11 | Stop reason: HOSPADM

## 2021-05-11 RX ADMIN — PROPOFOL 10 MG: 10 INJECTION, EMULSION INTRAVENOUS at 11:26

## 2021-05-11 RX ADMIN — PROPOFOL 10 MG: 10 INJECTION, EMULSION INTRAVENOUS at 11:16

## 2021-05-11 RX ADMIN — PROPOFOL 100 MG: 10 INJECTION, EMULSION INTRAVENOUS at 11:15

## 2021-05-11 RX ADMIN — PROPOFOL 10 MG: 10 INJECTION, EMULSION INTRAVENOUS at 11:30

## 2021-05-11 RX ADMIN — PROPOFOL 10 MG: 10 INJECTION, EMULSION INTRAVENOUS at 11:18

## 2021-05-11 RX ADMIN — PROPOFOL 10 MG: 10 INJECTION, EMULSION INTRAVENOUS at 11:28

## 2021-05-11 RX ADMIN — SODIUM CHLORIDE, POTASSIUM CHLORIDE, SODIUM LACTATE AND CALCIUM CHLORIDE: 600; 310; 30; 20 INJECTION, SOLUTION INTRAVENOUS at 09:39

## 2021-05-11 RX ADMIN — PROPOFOL 10 MG: 10 INJECTION, EMULSION INTRAVENOUS at 11:25

## 2021-05-11 RX ADMIN — PROPOFOL 10 MG: 10 INJECTION, EMULSION INTRAVENOUS at 11:27

## 2021-05-11 RX ADMIN — PROPOFOL 10 MG: 10 INJECTION, EMULSION INTRAVENOUS at 11:23

## 2021-05-11 RX ADMIN — PROPOFOL 10 MG: 10 INJECTION, EMULSION INTRAVENOUS at 11:21

## 2021-05-11 RX ADMIN — PROPOFOL 10 MG: 10 INJECTION, EMULSION INTRAVENOUS at 11:20

## 2021-05-11 RX ADMIN — PROPOFOL 10 MG: 10 INJECTION, EMULSION INTRAVENOUS at 11:32

## 2021-05-11 RX ADMIN — PROPOFOL 10 MG: 10 INJECTION, EMULSION INTRAVENOUS at 11:31

## 2021-05-11 RX ADMIN — PROPOFOL 10 MG: 10 INJECTION, EMULSION INTRAVENOUS at 11:33

## 2021-05-11 RX ADMIN — PROPOFOL 10 MG: 10 INJECTION, EMULSION INTRAVENOUS at 11:24

## 2021-05-11 RX ADMIN — PROPOFOL 10 MG: 10 INJECTION, EMULSION INTRAVENOUS at 11:19

## 2021-05-11 RX ADMIN — PROPOFOL 10 MG: 10 INJECTION, EMULSION INTRAVENOUS at 11:22

## 2021-05-11 NOTE — PERIOP NOTES
Handoff Report from Operating Room to PACU    Report received from Dr. Micheline Fuentes and Svetlana Villavicencio RN regarding Salomon Gonzales. Surgeon(s):  Jeanne Merritt MD  And Procedure(s) (LRB):  COLONOSCOPY (N/A)  COLON BIOPSY (N/A)  ENDOSCOPIC POLYPECTOMY (N/A)  confirmed   with allergies discussed. Anesthesia type, drugs, patient history, complications, estimated blood loss, vital signs, intake and output, and temperature were reviewed.

## 2021-05-11 NOTE — H&P
G I Procedure Note           Endoscopy History and Physical               Dr. Floresita Lyons [address] [address] [id number]  [id number]    1950  79 y.o.  male      Date of Procedure:   Preoperative Diagnosis:       Procedure:    5/11/2021           HX COLON POLYPS/RECTAL BLEED                              Procedure(s):  COLONOSCOPY      Gastroenterologist:  Anesthesia:           MD JEANNA Vazquez            History and procedure indication:  Roosevelt Saxena is a 79 y.o. BLACK/ male who presents with: HX COLON POLYPS/RECTAL BLEED   including the additional history of Screening and Personal History of Polyps ,Rectal bleeding,,        Past Medical History:   Diagnosis Date    Diabetes ([facility] [address]       Prior to Admission medications    Medication Sig Start Date End Date Taking? Authorizing Provider   atorvastatin (LIPITOR) 40 mg tablet Take 1 tablet by mouth once daily 4/23/21   Kristyn Medina MD   metFORMIN (GLUCOPHAGE) 1,000 mg tablet TAKE 1 TABLET BY MOUTH TWICE DAILY WITH  A  MEAL  Patient taking differently: daily. TAKE 1 TABLET BY MOUTH TWICE DAILY WITH  A  MEAL 8/25/20   Vladimir Lopez MD   glipiZIDE (GLUCOTROL) 10 mg tablet TAKE 1 TABLET BY MOUTH TWICE DAILY  Patient taking differently: 10 mg daily. 8/25/20   Vladimir Lopez MD   SITagliptin (JANUVIA) 100 mg tablet Take 1 Tab by mouth daily. 8/25/20   Vladimir Lopez MD   cefdinir (OMNICEF) 300 mg capsule Take 1 Cap by mouth two (2) times a day. 8/24/20   Kristyn Medina MD   aspirin delayed-release 81 mg tablet Take  by mouth daily. Provider, Historical   Lactobacillus acidophilus (PROBIOTIC PO) Take  by mouth.     Provider, Historical     No Known Allergies    Past Surgical History:   Procedure Laterality Date    HX ORTHOPAEDIC  2012    lumbar    HX PROSTATECTOMY  2012  HX UROLOGICAL  2012     No family history on file. Social History     Tobacco Use    Smoking status: Never Smoker    Smokeless tobacco: Never Used   Substance Use Topics    Alcohol use: No                                                      PHYSICAL EXAM   There were no vitals taken for this visit. General appearance:  alert, well appearing, and in no distress  Mental status:  normal mood, behavior, speech, dress, motor activity and thought processes  Nose:      normal and patent, no erythema, discharge or polyps  Mouth:- mucous membranes moist, pharynx normal without lesions                  [x]  No Loose teeth      []    Loose teeth  Finger opening:  []1     []1.5    [] 2     [] 2.5     [x] 3      [] 3.5     [] 4   Mallampati:         [] Class 1     [x] Class 2    [] Class 3      [] Class 4      Neck - supple,      [x] Full ROM [] Decreased ROM  [] Short Neck no significant adenopathy    Chest - clear to auscultation, no wheezes, rales or rhonchi, symmetric air entry  Heart: normal rate, regular rhythm, normal S1, S2, no murmurs, rubs, clicks or gallops  Abdomen: abdomen soft, bowel sounds  [x] normal  [] increased  [] hypoactive                       [] no tenderness  [] epigastric tenderness  [] LLQ tenderness   [] RLQ tenderness                      No masses, organomegaly or guarding. Rectal exam: negative without mass, lesions or tenderness  Extremities: peripheral pulses normal, no pedal edema, no clubbing or cyanosis  Neurologic: Alert and oriented to person, place, and time; normal strength and tone. Normal symmetric reflexes  Normal gait:                                      Assessement:                                 Pre op dx:  HX COLON POLYPS/RECTAL BLEED   Additional medical problems list below   There is no problem list on file for this patient.                                                                                           This note documentation was performed prior to this planned procedure       after a history and physical was performed in the office. Date: 4 33 24   Office exam   5/11/2021 Immediate update no changes in H&P                        Pre Procedure Evaluation (per anesthesia or per h&p)                                                Sedation/Assessment:                                                                                               Mallampati Classification                            []Class 1                    []Class 2                    [] Class 3                  [] Class 4                                              ASA classfication         []     Class I: Normally healthy         []     Class II: Patient with mild systemic disease (e.g. hypertension)         []     Class III: Patient with severe systemic disease (e.g. CHF), non-decompensated         []     Class IV: Patient with severe systemic disease, decompensated         []     Class V: Moribund patient, survival unlikely                     Plan:  []  Egd                                 [x] Colonoscopy                               [] with Moderate Sedation /Conscious Sedation                                 [x] MAC          Patient stable for planned procedure. See orders.      Isiah Ribera MD

## 2021-05-11 NOTE — ANESTHESIA PREPROCEDURE EVALUATION
Relevant Problems   No relevant active problems       Anesthetic History   No history of anesthetic complications            Review of Systems / Medical History  Patient summary reviewed and pertinent labs reviewed    Pulmonary  Within defined limits                 Neuro/Psych   Within defined limits           Cardiovascular  Within defined limits                Exercise tolerance: >4 METS     GI/Hepatic/Renal  Within defined limits              Endo/Other    Diabetes         Other Findings              Physical Exam    Airway  Mallampati: I  TM Distance: 4 - 6 cm  Neck ROM: normal range of motion   Mouth opening: Normal     Cardiovascular    Rhythm: regular  Rate: normal         Dental    Dentition: Full upper dentures and Lower dentition intact     Pulmonary  Breath sounds clear to auscultation               Abdominal  GI exam deferred       Other Findings            Anesthetic Plan    ASA: 2  Anesthesia type: MAC            Anesthetic plan and risks discussed with: Patient

## 2021-05-11 NOTE — PERIOP NOTES
Patient meets discharge criteria. Patient and friend provided with verbal and written discharge instructions. Patient discharged by wheelchair with friend to transport home.

## 2021-05-11 NOTE — ANESTHESIA POSTPROCEDURE EVALUATION
Procedure(s):  COLONOSCOPY  COLON BIOPSY  ENDOSCOPIC POLYPECTOMY. MAC    Anesthesia Post Evaluation      Multimodal analgesia: multimodal analgesia not used between 6 hours prior to anesthesia start to PACU discharge  Patient location during evaluation: PACU  Patient participation: complete - patient participated  Level of consciousness: awake and alert  Pain management: adequate  Airway patency: patent  Anesthetic complications: no  Cardiovascular status: acceptable  Respiratory status: acceptable  Hydration status: acceptable  Post anesthesia nausea and vomiting:  none  Final Post Anesthesia Temperature Assessment:  Normothermia (36.0-37.5 degrees C)      INITIAL Post-op Vital signs:   Vitals Value Taken Time   /96 05/11/21 1205   Temp 36.3 °C (97.4 °F) 05/11/21 1205   Pulse 74 05/11/21 1209   Resp 15 05/11/21 1209   SpO2 100 % 05/11/21 1209   Vitals shown include unvalidated device data.

## 2021-05-11 NOTE — PROCEDURES
G I Procedure Note            COLONOSCOPY   Dr. Sue [facility] office   [address]                                   [id number]                                  [id number]   1950                                      79 y.o.                                    male      Procedure Date: 5/11/2021   [x]  Anesthesia MAC                                                                                                Pre Op Diagnosis:    Indications:                   1. HX COLON POLYPS/RECTAL BLEED                                                                                                                                                                          Post Op Diagnosis:                    1.   POOR PREP, removed COLON POLYPx 2, RECTAL POLYP, DIVERTICULOSIS, HEMORRHOIDS                                                           2.      3.                         H&p completed: Yes            Anesthesia Assessment: Performed prior to procedure:      No change  Anesthesia Plan: Performed prior to procedure:                   No change       Medications: See Reviewed List and Reconcilation           Informed consent was obtained     Risk Statement:  Prior to the procedure the risks were explained to the patient and/or to the family including but not limited to perforation, bleeding, adverse drug reaction, aspiration, and even the need for possible surgery. A colonoscopy exam is not 100% accurate which may be related to preparation or blind spots during the exam.The possibility that an abnormality and /or cancer could be missed was also discussed as well as alternative x-ray options.          Instrument:    Olympus adult Videocolonoscope                                   Immediate Procedure Reassessment Completed     With the patient in the left lateral position, a rectal examination was performed and the findings were: negative without mass, lesions or tenderness   The Olympus Video colonoscope was inserted under direct vision into the rectum. The colonoscope was passed from the rectum to the cecum, which was identified by the ileocecal valve. The colon findings demonstrated:  ANUS: Anal exam reveals no masses or external hemorrhoids, sphincter tone is normal.   RECTUM: Rectal exam reveals no masses  . SIGMOID COLON: The sigmoid was unremarkable except as noted below   Findings below   DESCENDING COLON:  The videoscolonoscope was advanced carefully. Findings below  SPLENIC FLEXURE: The splenic flexure is normal.   TRANSVERSE COLON:  The typical triangular pattern was noted. Findings below      HEPATIC FLEXURE: The hepatic flexure is normal.   ASCENDING COLON:  No  bleeding Findings below     CECUM:  The ileocecal valve appears normal.   TERMINAL ILEUM: The terminal ileum was not entered. Finding noted      [x] mucosa normal      [x] Diverticulosis     [] avm     [] Additional findings:      A  . A polyp was identified. #5,   mm in size, located in the ascending colon removed by cold biopsy and sent for pathology  #10,   mm in size, located in the rectosigmoid removed by snare cautery and retrieved for pathology The polypectomy site was reviewed and was free of hemorrhage. The colonoscope was slowly withdrawn >6 minute period and the instrument was retroflexed in the rectum. The rectal findings were:Protruding lesions:     -Internal Hemorrhoids  The patient tolerated the entire procedure well. Blood Loss nil  No complications  Anesthesia  MAC  No crystalloids  No Implants  Assistants : per nursing documentation team members     For biopsy  Specimen verification by physician and nurse two sources, name,           social security numbers     Colon preparation was good    Recommendations:     - Repeat colonoscopy in 2-3 years.  2nd polyp and poor prep  - ( 2 days prep next colonscopy )      Copies sent to   Bobo Jean-Baptiste MD  CC:  Melissa Orourke MD

## 2021-05-11 NOTE — DISCHARGE INSTRUCTIONS
Endoscopy Discharge Instructions     Dr. Baez Miller office                                            NAME: Amarilis Alejandro RECORD JQPYPH:[id number]    AGE:  79 y.o. YOB: 1950                                                              FINAL Discharge Procedure and Diagnosis:       Procedure(s):  COLONOSCOPY       FINDINGS:     Polyp x 2 removed   diverticulosis                                        MEDICATIONS    [x] CONTINUE CURRENT MEDICATIONS     [] NEW MEDICATIONS           1.    2.    3.         Testing   Schedule              Colonoscopy Screening                                   Recommendations       []  Repeat colonoscopy in 6-12 month         2nd to Inadequate  prep    [x]  Repeat colonoscopy in 2 years 2nd prep and multiple polyp    []  Repeat colonoscopy in 5 years    []  Repeat colonoscopy in 10 years         New additional  Tests  Call the office   ([phone]) for the appointment time      []      []      []                                     YOUR NEXT APPOINTMENT WITH DR Garcia [place]:                                                                                                                                [x]   None follow up with pcp   []  1 week       []   2 week    []  1 month    Always keep Rakel Walker MD for regular medical follow up                                                                                                                         If you had a colonoscopy the \"C\" indicates specific instructions        x                                           Diet Instructions :   Ordinarily you may resume your previous diet but your initial diet should be       Light your discharge nurse will go over this with you. Large meals can cause  abdominal discomfort after these procedures. Specific Diet Recommendations:        [x] High fiber diet. https://www.[employer]/. com/diets/        [] GERD diet: avoid fried and fatty foods, peppermint, chocolate, alcohol,               coffee, citrus fruits and juices, and tomato products. Avoid lying down for            2 to 3  hours after eating. https://www.mendez.com/. com/diets/            []  FODMAP DIET  DeathUnit.nl              []  All diets eg high fiber, gastroparesis. , weight loss , gluten free             1. [employer]              2.  https://www.[employer]. bizk.it/diets/           __x__  Dwaine Espino may feel quite tired and need to rest and recuperate for several hours    following these procedures. __x__  Due to the fact that sedation was administered for this procedure, do not drive,   operate machinery or sign legal documents for the next 24 hours. __x__  Mild abdominal pain may be experienced after your procedure, but is should   disappear after several hours. Notify your physician if you have persistent pain,   tenderness or abdominal distension. __x__  C    Many patients for the first few hours following the exam may experience         belching or passing gas through the rectum. Walking may help to relieve        distention and gas pains. A warm bath or shower will often help with abdominal  cramping.                                                                                            __x__   Dwaine Espino may return to your normal routine tomorrow, according to how you feel        and depending on your doctors instructions. Be sure to call your doctor to make  an appointment for a post-surgery check-up on the date your doctor has   requested. __x__ C     Rectal bleeding or spotting in small amounts may occur with the first bowel   movement following a colonoscopy or sigmoidoscopy. If a large amount of blood is noted call immediately     __x__  You may experience a numbness or lack of sensation in throat. If present, do not     eat or drink. Before eating, test your ability to drink with small sips of water. Y     You may try clear liquids or soups. If you tolerate these, you may then eat solid     food which is not greasy or spicy. __x__ C     IF POLYPS REMOVED: Avoid any blood thinning medication such as plavix,   aspirin or coumadin  NSAIDS (like advil or alleve) for 7 days. __x__  Notify your physician if you cough or vomit blood or experience chest pain. Your biopsy or testing result should be available in 7-10 days                                                                                                                      Prescription will be electronically sent to your pharmacy you must     let your nurse know your pharmacy:                                                                                                                                          [address] [place] [address]. TO HELP ENSURE A SMOOTH RECOVERY,       IT IS IMPORTANT TO FOLLOW THEM. _x___Pamphlet /Educational Information provided for diagnostic findings     Additional education information can assessed at the sites below:   [employer]   http://www.digestive. niddk.nih.gov/ddiseases/a-z.asp      Web MD patient information                                                                                                Signature of individual given instructions :   Date: 5/11/2021                                                                                                                            Patient Education   Learning About Coronavirus (COVID-19)  Coronavirus [phone]): Overview  What is coronavirus ([id number])?   The coronavirus disease (COVID-19) is caused by a virus. It is an illness that was first found in [place], [place], in December 2019. It has since spread worldwide. The virus can cause fever, cough, and trouble breathing. In severe cases, it can cause pneumonia and make it hard to breathe without help. It can cause death. Coronaviruses are a large group of viruses. They cause the common cold. They also cause more serious illnesses like Middle East respiratory syndrome (MERS) and severe acute respiratory syndrome (SARS). COVID-19 is caused by a novel coronavirus. That means it's a new type that has not been seen in people before. This virus spreads person-to-person through droplets from coughing and sneezing. It can also spread when you are close to someone who is infected. And it can spread when you touch something that has the virus on it, such as a doorknob or a tabletop. What can you do to protect yourself from coronavirus (COVID-19)? The best way to protect yourself from getting sick is to:  · Avoid areas where there is an outbreak. · Avoid contact with people who may be infected. · Wash your hands often with soap or alcohol-based hand sanitizers. · Avoid crowds and try to stay at least 6 feet away from other people. · Wash your hands often, especially after you cough or sneeze. Use soap and water, and scrub for at least 20 seconds. If soap and water aren't available, use an alcohol-based hand . · Avoid touching your mouth, nose, and eyes. What can you do to avoid spreading the virus to others? To help avoid spreading the virus to others:  · Cover your mouth with a tissue when you cough or sneeze. Then throw the tissue in the trash. · Use a disinfectant to clean things that you touch often. · Stay home if you are sick or have been exposed to the virus. Don't go to school, work, or public areas. And don't use public transportation. · If you are sick:  ? Leave your home only if you need to get medical care.  But call the doctor's office first so they know you're coming. And wear a face mask, if you have one.  ? If you have a face mask, wear it whenever you're around other people. It can help stop the spread of the virus when you cough or sneeze. ? Clean and disinfect your home every day. Use household  and disinfectant wipes or sprays. Take special care to clean things that you grab with your hands. These include doorknobs, remote controls, phones, and handles on your refrigerator and microwave. And don't forget countertops, tabletops, bathrooms, and computer keyboards. When to call for help  Call 911 anytime you think you may need emergency care. For example, call if:  · You have severe trouble breathing. (You can't talk at all.)  · You have constant chest pain or pressure. · You are severely dizzy or lightheaded. · You are confused or can't think clearly. · Your face and lips have a blue color. · You pass out (lose consciousness) or are very hard to wake up. Call your doctor now if you develop symptoms such as:  · Shortness of breath. · Fever. · Cough. If you need to get care, call ahead to the doctor's office for instructions before you go. Make sure you wear a face mask, if you have one, to prevent exposing other people to the virus. Where can you get the latest information? The following health organizations are tracking and studying this virus. Their websites contain the most up-to-date information. Elio Rashid[place]arlitos also learn what to do if you think you may have been exposed to the virus. · U.S. Centers for Disease Control and Prevention (CDC): The CDC provides updated news about the disease and travel advice. The website also tells you how to prevent the spread of infection. www.cdc.gov  · World Health Organization [facility]): WHO offers information about the virus outbreaks. WHO also has travel advice. www.who.int  Current as of: April 1, 2020               Content Version: 12.4  © 2686-8026 [employer]wise[employer].    Care instructions adapted under license by your healthcare professional. If you have questions about a medical condition or this instruction, always ask your healthcare professional. [place] [address] any warranty or liability for your use of this information. DISCHARGE SUMMARY from Nurse    PATIENT INSTRUCTIONS:    After general anesthesia or intravenous sedation, for 24 hours or while taking prescription Narcotics:  · Limit your activities  · Do not drive and operate hazardous machinery  · Do not make important personal or business decisions  · Do  not drink alcoholic beverages  · If you have not urinated within 8 hours after discharge, please contact your surgeon on call. Report the following to your surgeon:  · Excessive pain, swelling, redness or odor of or around the surgical area  · Temperature over 100.5  · Nausea and vomiting lasting longer than 4 hours or if unable to take medications  · Any signs of decreased circulation or nerve impairment to extremity: change in color, persistent  numbness, tingling, coldness or increase pain  · Any questions      These are general instructions for a healthy lifestyle:    No smoking/ No tobacco products/ Avoid exposure to second hand smoke  Surgeon General's Warning:  Quitting smoking now greatly reduces serious risk to your health. Obesity, smoking, and sedentary lifestyle greatly increases your risk for illness    A healthy diet, regular physical exercise & weight monitoring are important for maintaining a healthy lifestyle    You may be retaining fluid if you have a history of heart failure or if you experience any of the following symptoms:  Weight gain of 3 pounds or more overnight or 5 pounds in a week, increased swelling in our hands or feet or shortness of breath while lying flat in bed. Please call your doctor as soon as you notice any of these symptoms; do not wait until your next office visit.         The discharge information has been reviewed with the patient and friend. The patient and friend verbalized understanding. Discharge medications reviewed with the {[facility] meds reviewed [facility]:[id number]} and appropriate educational materials and side effects teaching were provided.   ___________________________________________________________________________________________________________________________________

## 2021-05-12 ENCOUNTER — OFFICE VISIT (OUTPATIENT)
Dept: ENDOCRINOLOGY | Age: 71
End: 2021-05-12
Payer: MEDICARE

## 2021-05-12 VITALS
OXYGEN SATURATION: 97 % | TEMPERATURE: 96.5 F | SYSTOLIC BLOOD PRESSURE: 127 MMHG | HEIGHT: 71 IN | RESPIRATION RATE: 18 BRPM | DIASTOLIC BLOOD PRESSURE: 72 MMHG | HEART RATE: 60 BPM | BODY MASS INDEX: 36.43 KG/M2 | WEIGHT: 260.2 LBS

## 2021-05-12 DIAGNOSIS — I10 ESSENTIAL HYPERTENSION: ICD-10-CM

## 2021-05-12 DIAGNOSIS — E11.65 UNCONTROLLED TYPE 2 DIABETES MELLITUS WITH HYPERGLYCEMIA (HCC): Primary | ICD-10-CM

## 2021-05-12 DIAGNOSIS — E78.2 MIXED HYPERLIPIDEMIA: ICD-10-CM

## 2021-05-12 PROCEDURE — G8536 NO DOC ELDER MAL SCRN: HCPCS | Performed by: INTERNAL MEDICINE

## 2021-05-12 PROCEDURE — 99214 OFFICE O/P EST MOD 30 MIN: CPT | Performed by: INTERNAL MEDICINE

## 2021-05-12 PROCEDURE — 1101F PT FALLS ASSESS-DOCD LE1/YR: CPT | Performed by: INTERNAL MEDICINE

## 2021-05-12 PROCEDURE — 2022F DILAT RTA XM EVC RTNOPTHY: CPT | Performed by: INTERNAL MEDICINE

## 2021-05-12 PROCEDURE — G8510 SCR DEP NEG, NO PLAN REQD: HCPCS | Performed by: INTERNAL MEDICINE

## 2021-05-12 PROCEDURE — 3051F HG A1C>EQUAL 7.0%<8.0%: CPT | Performed by: INTERNAL MEDICINE

## 2021-05-12 PROCEDURE — G8417 CALC BMI ABV UP PARAM F/U: HCPCS | Performed by: INTERNAL MEDICINE

## 2021-05-12 PROCEDURE — 3017F COLORECTAL CA SCREEN DOC REV: CPT | Performed by: INTERNAL MEDICINE

## 2021-05-12 PROCEDURE — G8427 DOCREV CUR MEDS BY ELIG CLIN: HCPCS | Performed by: INTERNAL MEDICINE

## 2021-05-12 PROCEDURE — G8754 DIAS BP LESS 90: HCPCS | Performed by: INTERNAL MEDICINE

## 2021-05-12 PROCEDURE — G8752 SYS BP LESS 140: HCPCS | Performed by: INTERNAL MEDICINE

## 2021-05-12 RX ORDER — METFORMIN HYDROCHLORIDE 1000 MG/1
TABLET ORAL
Qty: 180 TAB | Refills: 3 | Status: SHIPPED | OUTPATIENT
Start: 2021-05-12 | End: 2021-11-12 | Stop reason: SDUPTHER

## 2021-05-12 RX ORDER — GLIPIZIDE 5 MG/1
TABLET ORAL
Qty: 90 TAB | Refills: 3 | Status: SHIPPED | OUTPATIENT
Start: 2021-05-12 | End: 2021-11-12 | Stop reason: SDUPTHER

## 2021-05-12 NOTE — PROGRESS NOTES
Room 1    Identified pt with two pt identifiers(name and ). Reviewed record in preparation for visit and have obtained necessary documentation. All patient medications has been reviewed. Chief Complaint   Patient presents with    Diabetes       3 most recent PHQ Screens 2021   PHQ Not Done -   Little interest or pleasure in doing things Not at all   Feeling down, depressed, irritable, or hopeless Not at all   Total Score PHQ 2 0     Abuse Screening Questionnaire 2021   Do you ever feel afraid of your partner? N   Are you in a relationship with someone who physically or mentally threatens you? N   Is it safe for you to go home? Y       Health Maintenance Review: Patient reminded of \"due or due soon\" health maintenance. I have asked the patient to contact his/her primary care provider (PCP) for follow-up on his/her health maintenance. Vitals:    21 0913   BP: 127/72   Pulse: 60   Resp: 18   Temp: (!) 96.5 °F (35.8 °C)   TempSrc: Oral   SpO2: 97%   Weight: 260 lb 3.2 oz (118 kg)   Height: 5' 11\" (1.803 m)   PainSc:   0 - No pain       Wt Readings from Last 3 Encounters:   21 260 lb 3.2 oz (118 kg)   21 255 lb (115.7 kg)   21 260 lb (117.9 kg)     Temp Readings from Last 3 Encounters:   21 (!) 96.5 °F (35.8 °C) (Oral)   21 97.4 °F (36.3 °C)   21 97.9 °F (36.6 °C) (Oral)     BP Readings from Last 3 Encounters:   21 127/72   21 (!) 147/96   21 130/84     Pulse Readings from Last 3 Encounters:   21 60   21 71   21 74       Lab Results   Component Value Date/Time    Hemoglobin A1c 7.5 (H) 2021 12:00 AM    Hemoglobin A1c (POC) 6.7 2021 09:19 AM    Hemoglobin A1c, External 6.8 2018       Coordination of Care Questionnaire:   1) Have you been to an emergency room, urgent care, or hospitalized since your last visit?   no       2. Have seen or consulted any other health care provider since your last visit? NO    Advance Care Planning:   End of Life Planning: has NO advanced directive - not interested in additional information    Patient is accompanied by self I have received verbal consent from Kevin Bowles to discuss any/all medical information while they are present in the room.

## 2021-05-12 NOTE — PATIENT INSTRUCTIONS
SPECIFIC INSTRUCTIONS BELOW           DRINK water   Do not skip meals  Do not eat in between meals    Reduce carbs- pasta, rice, potatoes, bread   Do not drink juices or sodas, even they are calorie zero or diet drinks  Do not eat peanut butter     Do not eat sugar free cookies and cakes   Do not eat peaches, oranges, pineapples, raisins, grapes , canteloupe , honey dew and fruit medleys      -----------------------------------------------------------------------------------------------------------------      Stay on  Metformin 1000 mg  One pill   Twice   a day with meals     Stay on  glipizide 5 mg , twenty minutes before b-fast and dinner - stop 10 mg dose       Stay on januvia  100 mg a day       ---------------------------------------------------------------------------------------------------------------        PAY ATTENTION TO THESE GENERAL INSTRUCTIONS     -ANY tests other than blood work, which you opt to do  outside [facility] imaging facilities, you are responsible for prior authorizations if  required   - HEALTH MAINTENANCE IS NOT GOING TO BE UP TO DATE ON YOUR AVS- PLEASE IGNORE   - YOUR MED LIST IS NOT UP TO DATE AS SOME CHANGES ARE BEING MADE AFTER THE VISIT - [address] [place] [address] Street     Results     *Normal results will not be notified by a phone call starting January 1 2021   *If you have an upcoming visit, the results will be discussed at the visit   *Please sign up for MY CHART if you want access to your lab and test results  *Abnormal results which require immediate attention will be notified by phone call   *Abnormal results which do not require immediate assistance will be notified in 1-2 weeks       Refills    -    have your pharmacy send us a refill request  Phone calls  -  Allow  24 hrs.  for non-urgent calls to be returned  Prior authorization - It may take 2-4 weeks to process  Forms  -  FMLA, DMV etc., will take up to 2 weeks to process  Cancellations - please notify the office 2 days in advance   Samples  - will only be dispensed at visits     --------------------------------------------------------------------------------------------

## 2021-05-12 NOTE — PROGRESS NOTES
HISTORY OF PRESENT ILLNESS  Tuan Hurley is a 79 y.o. male. Patient here for    VISIT  AFTER  initial visit of Type 2 diabetes mellitus  From December 2020     Weight is stable   He had malaena - had colonoscopy - polyps removed   He again reduced glipizide to only one pill a day , @ 10 mg a day       Dec 2020     GAINED 30 LBS   Muscle bulk - eating a lot he says   No meter       August 2020     Admitted there for  Pneumonia in august 2020 - at [facility] doctors   Just got out of hospital   But he says he will start taking care of himself better   Plans to bring the meter   He is started on januvia by pcp , he says it is a bit pricy       OLD HISTORY  :    Referred : by self/pcp  H/o diabetes for few years   Current A1C is 8 % and symptoms/problems include none   Current diabetic medications include metformin and glipizide . Review of Systems   None       Physical Exam   Constitutional: She is oriented to person, place, and time. She appears well-developed and well-nourished. Psychiatric: She has a normal mood and affect.        Lab Results   Component Value Date/Time    Hemoglobin A1c 7.5 (H) 05/05/2021 12:00 AM    Hemoglobin A1c 6.9 (H) 12/08/2020 12:00 AM    Hemoglobin A1c 6.5 (H) 01/26/2017 02:35 PM    Hemoglobin A1c, External 6.8 01/04/2018    Glucose 142 (H) 05/05/2021 12:00 AM    Glucose (POC) 114 05/11/2021 09:59 AM    Microalb/Creat ratio (ug/mg creat.) 11 05/05/2021 12:00 AM    LDL, calculated 60 05/05/2021 12:00 AM    Creatinine 0.86 05/05/2021 12:00 AM      Lab Results   Component Value Date/Time    Cholesterol, total 134 05/05/2021 12:00 AM    Cholesterol (POC) 165 01/06/2021 09:20 AM    HDL Cholesterol 61 05/05/2021 12:00 AM    LDL, calculated 60 05/05/2021 12:00 AM    LDL Cholesterol (POC) 84 01/06/2021 09:20 AM    LDL-C, External 80 01/04/2018    Triglyceride 62 05/05/2021 12:00 AM    Triglycerides (POC) 50 01/06/2021 09:20 AM     Lab Results   Component Value Date/Time    ALT (SGPT) 22 05/05/2021 12:00 AM    Alk. phosphatase 61 05/05/2021 12:00 AM    Bilirubin, total 0.5 05/05/2021 12:00 AM    Albumin 4.4 05/05/2021 12:00 AM    Protein, total 7.7 05/05/2021 12:00 AM    PLATELET 755 57/50/4398 09:24 AM     Lab Results   Component Value Date/Time    GFR est non-AA 88 05/05/2021 12:00 AM    GFR est  05/05/2021 12:00 AM    Creatinine 0.86 05/05/2021 12:00 AM    BUN 12 05/05/2021 12:00 AM    Sodium 142 05/05/2021 12:00 AM    Potassium 4.6 05/05/2021 12:00 AM    Chloride 104 05/05/2021 12:00 AM    CO2 23 05/05/2021 12:00 AM             ASSESSMENT and PLAN    1. Type 2 DM, uncontrolled :  a1c is  7.5 %     From   May 2021   Compared to     6.9 %    FROM DEC 2020    COMPARED TO  8.3 %    From today  August 24 th 2020     Compared to    8 %  From nov 2019         May 2021   He is not surprised because he  Had decreased the number of pills  To once a day   Educated him about time of action  Profile of meds   Glipizide 5 mg bid and metformin and  januvia - stay on     DEC 2020   Gained a lot of weight  - does not seem fluid gain  He says he ate a lot and worked out a lot - muscle bulk   Not using anabolics   He understands that he has to lose weight to prevent weight on his joints   No meter   Stay on januvia,  Metformin and glipizide ( he is using it only once a day )  So, rreduced dose of glipizide to 5 mg bid         August 2020   Just got out of hospital  With pneumonia , lost weight   He is to improve his nutrition   Am glad he understands the rationale behind checks and promises to do better   Stay on januvia,  Metformin and glipizide         Jan 2020    Discussed DM 2 patho-physiology extensively    Reviewed the glucose log : no( he does not like to check sugars )  Continuing on metformin and glipizide   Patient is advised about checking blood sugars 1-2 times a day and maintaining log book. 2. Hypoglycemia :  Educated on treating the hypoglycemia.  Discussed Glipizide  use and prescribed    3. HTN : continue current care. 4.  Dyslipidemia : continue current meds.      5. use of aspirin to prevent MI and TIA's discussed        Reviewed results with patient and discussed the labs being ordered today/bnv  Patient voiced understanding of plan of care

## 2021-07-15 ENCOUNTER — OFFICE VISIT (OUTPATIENT)
Dept: INTERNAL MEDICINE CLINIC | Age: 71
End: 2021-07-15
Payer: MEDICARE

## 2021-07-15 VITALS
SYSTOLIC BLOOD PRESSURE: 138 MMHG | TEMPERATURE: 98 F | HEART RATE: 63 BPM | HEIGHT: 71 IN | OXYGEN SATURATION: 97 % | RESPIRATION RATE: 16 BRPM | WEIGHT: 256 LBS | DIASTOLIC BLOOD PRESSURE: 78 MMHG | BODY MASS INDEX: 35.84 KG/M2

## 2021-07-15 DIAGNOSIS — E78.2 MIXED HYPERLIPIDEMIA: Primary | ICD-10-CM

## 2021-07-15 DIAGNOSIS — E78.00 HYPERCHOLESTEREMIA: ICD-10-CM

## 2021-07-15 DIAGNOSIS — R31.9 HEMATURIA OF UNKNOWN CAUSE: ICD-10-CM

## 2021-07-15 DIAGNOSIS — E11.49 TYPE II OR UNSPECIFIED TYPE DIABETES MELLITUS WITH NEUROLOGICAL MANIFESTATIONS, UNCONTROLLED(250.62) (HCC): ICD-10-CM

## 2021-07-15 DIAGNOSIS — R80.9 PROTEINURIA, UNSPECIFIED TYPE: ICD-10-CM

## 2021-07-15 DIAGNOSIS — I10 ESSENTIAL HYPERTENSION: ICD-10-CM

## 2021-07-15 LAB
BASOPHILS # BLD: 0.1 K/UL (ref 0–0.1)
BASOPHILS NFR BLD: 1 % (ref 0–1)
BILIRUB UR QL STRIP: NEGATIVE
CHOLEST SERPL-MCNC: 145 MG/DL
DIFFERENTIAL METHOD BLD: ABNORMAL
EOSINOPHIL # BLD: 0.1 K/UL (ref 0–0.4)
EOSINOPHIL NFR BLD: 2 % (ref 0–7)
ERYTHROCYTE [DISTWIDTH] IN BLOOD BY AUTOMATED COUNT: 11.9 % (ref 11.5–14.5)
GLUCOSE UR-MCNC: NEGATIVE MG/DL
HBA1C MFR BLD HPLC: 7 %
HCT VFR BLD AUTO: 38.9 % (ref 36.6–50.3)
HDLC SERPL-MCNC: 66 MG/DL
HGB BLD-MCNC: 12.4 G/DL (ref 12.1–17)
IMM GRANULOCYTES # BLD AUTO: 0 K/UL (ref 0–0.04)
IMM GRANULOCYTES NFR BLD AUTO: 0 % (ref 0–0.5)
KETONES P FAST UR STRIP-MCNC: NEGATIVE MG/DL
LDL CHOLESTEROL POC: 68 MG/DL
LYMPHOCYTES # BLD: 1.4 K/UL (ref 0.8–3.5)
LYMPHOCYTES NFR BLD: 25 % (ref 12–49)
MCH RBC QN AUTO: 30.9 PG (ref 26–34)
MCHC RBC AUTO-ENTMCNC: 31.9 G/DL (ref 30–36.5)
MCV RBC AUTO: 97 FL (ref 80–99)
MONOCYTES # BLD: 0.4 K/UL (ref 0–1)
MONOCYTES NFR BLD: 7 % (ref 5–13)
NEUTS SEG # BLD: 3.7 K/UL (ref 1.8–8)
NEUTS SEG NFR BLD: 65 % (ref 32–75)
NON-HDL CHOLESTEROL, 011976: 79
NRBC # BLD: 0 K/UL (ref 0–0.01)
NRBC BLD-RTO: 0 PER 100 WBC
PH UR STRIP: 5 [PH] (ref 4.6–8)
PLATELET # BLD AUTO: 188 K/UL (ref 150–400)
PMV BLD AUTO: 12 FL (ref 8.9–12.9)
PROT UR QL STRIP: NORMAL
RBC # BLD AUTO: 4.01 M/UL (ref 4.1–5.7)
SP GR UR STRIP: 1.03 (ref 1–1.03)
TCHOL/HDL RATIO (POC): 2.2
TRIGL SERPL-MCNC: 56 MG/DL
UA UROBILINOGEN AMB POC: NORMAL (ref 0.2–1)
URINALYSIS CLARITY POC: NORMAL
URINALYSIS COLOR POC: NORMAL
URINE BLOOD POC: NORMAL
URINE LEUKOCYTES POC: NEGATIVE
URINE NITRITES POC: NEGATIVE
WBC # BLD AUTO: 5.7 K/UL (ref 4.1–11.1)

## 2021-07-15 PROCEDURE — G8752 SYS BP LESS 140: HCPCS | Performed by: INTERNAL MEDICINE

## 2021-07-15 PROCEDURE — 3017F COLORECTAL CA SCREEN DOC REV: CPT | Performed by: INTERNAL MEDICINE

## 2021-07-15 PROCEDURE — 99214 OFFICE O/P EST MOD 30 MIN: CPT | Performed by: INTERNAL MEDICINE

## 2021-07-15 PROCEDURE — 1101F PT FALLS ASSESS-DOCD LE1/YR: CPT | Performed by: INTERNAL MEDICINE

## 2021-07-15 PROCEDURE — 81003 URINALYSIS AUTO W/O SCOPE: CPT | Performed by: INTERNAL MEDICINE

## 2021-07-15 PROCEDURE — G8510 SCR DEP NEG, NO PLAN REQD: HCPCS | Performed by: INTERNAL MEDICINE

## 2021-07-15 PROCEDURE — 83036 HEMOGLOBIN GLYCOSYLATED A1C: CPT | Performed by: INTERNAL MEDICINE

## 2021-07-15 PROCEDURE — 2022F DILAT RTA XM EVC RTNOPTHY: CPT | Performed by: INTERNAL MEDICINE

## 2021-07-15 PROCEDURE — G8754 DIAS BP LESS 90: HCPCS | Performed by: INTERNAL MEDICINE

## 2021-07-15 PROCEDURE — G8536 NO DOC ELDER MAL SCRN: HCPCS | Performed by: INTERNAL MEDICINE

## 2021-07-15 PROCEDURE — 80061 LIPID PANEL: CPT | Performed by: INTERNAL MEDICINE

## 2021-07-15 PROCEDURE — G8427 DOCREV CUR MEDS BY ELIG CLIN: HCPCS | Performed by: INTERNAL MEDICINE

## 2021-07-15 PROCEDURE — G8417 CALC BMI ABV UP PARAM F/U: HCPCS | Performed by: INTERNAL MEDICINE

## 2021-07-15 NOTE — PATIENT INSTRUCTIONS
Bambuser Activation    Thank you for requesting access to Bambuser. Please follow the instructions below to securely access and download your online medical record. Bambuser allows you to send messages to your doctor, view your test results, renew your prescriptions, schedule appointments, and more. How Do I Sign Up? 1. In your internet browser, go to [email or web address]  2. Click on the First Time User? Click Here link in the Sign In box. You will be redirect to the New Member Sign Up page. 3. Enter your Bambuser Access Code exactly as it appears below. You will not need to use this code after youve completed the sign-up process. If you do not sign up before the expiration date, you must request a new code. Bambuser Access Code: [email or web address]  Expires: 2021  2:04 PM (This is the date your Bambuser access code will )    4. Enter the last four digits of your Social Security Number (xxxx) and Date of Birth (mm/dd/yyyy) as indicated and click Submit. You will be taken to the next sign-up page. 5. Create a Bambuser ID. This will be your Bambuser login ID and cannot be changed, so think of one that is secure and easy to remember. 6. Create a Bambuser password. You can change your password at any time. 7. Enter your Password Reset Question and Answer. This can be used at a later time if you forget your password. 8. Enter your e-mail address. You will receive e-mail notification when new information is available in [address]. 9. Click Sign Up. You can now view and download portions of your medical record. 10. Click the Download Summary menu link to download a portable copy of your medical information. Additional Information    If you have questions, please visit the Frequently Asked Questions section of the Bambuser website at [email or web address] Remember, Bambuser is NOT to be used for urgent needs. For medical emergencies, dial 911.

## 2021-07-15 NOTE — PROGRESS NOTES
Alberto More is a 79 y.o. male and presents with Diabetes and Cholesterol Problem  . Subjective:  He has not recently seen blood in the urine    He has colonic polyps  The biopsy results proved negative. Hypertension Review:  The patient has essential hypertension . Diet and Lifestyle: generally follows a low sodium diet, exercises sporadically  Home BP Monitoring: is not measured at home. Pertinent ROS: taking medications as instructed, no medication side effects noted, no TIA's, no chest pain on exertion, no dyspnea on exertion, no swelling of ankles. Diabetes Mellitus Review:  He has diabetes mellitus. Diabetic ROS - medication compliance: compliant all of the time, diabetic diet compliance: compliant all of the time, home glucose monitoring: is performed. Known diabetic complications: none  Cardiovascular risk factors: family history, dyslipidemia, diabetes mellitus, obesity, hypertension  Current diabetic medications include oral agents/  Eye exam current (within one year): no  Weight trend: stable  Prior visit with dietician: no  Current diet: \"healthy\" diet  in general  Current exercise: walking  Home blood sugar records: trend: stable  Any episodes of hypoglycemia? no      Dyslipidemia Review:  Patient presents for evaluation of lipids. Compliance with treatment thus far has been excellent. A repeat fasting lipid profile was not done. The patient does not use medications that may worsen dyslipidemias (corticosteroids, progestins, anabolic steroids, amiodarone, cyclosporine, olanzapine). The patient exercises some    Review of Systems  Constitutional: negative for fevers, chills, anorexia and weight loss  Eyes:   negative for visual disturbance and irritation  ENT:   negative for tinnitus,sore throat,nasal congestion,ear pains. hoarseness  Respiratory:  negative for cough, hemoptysis, dyspnea,wheezing  CV:   negative for chest pain, palpitations, lower extremity edema  GI:   negative for nausea, vomiting, diarrhea, abdominal pain  Endo:               negative for polyuria,polydipsia,polyphagia,heat intolerance  Genitourinary: negative for frequency  Integument:  negative for rash and pruritus  Hematologic:  negative for easy bruising and gum/nose bleeding  Musculoskel: negative for myalgias, arthralgias, back pain, muscle weakness, joint pain  Neurological:  negative for headaches, dizziness, vertigo, memory problems and gait   Behavl/Psych: negative for feelings of anxiety, depression, mood changes    Past Medical History:   Diagnosis Date    Diabetes [facility])      Past Surgical History:   Procedure Laterality Date    COLONOSCOPY N/A 5/11/2021    COLONOSCOPY performed by Aline Harris MD at [place] [address] HX ORTHOPAEDIC  2012    lumbar    HX PROSTATECTOMY  2012    HX UROLOGICAL  2012     Social History     Socioeconomic History    Marital status: SINGLE     Spouse name: Not on file    Number of children: 2    Years of education: Not on file    Highest education level: Not on file   Tobacco Use    Smoking status: Never Smoker    Smokeless tobacco: Never Used   Vaping Use    Vaping Use: Never used   Substance and Sexual Activity    Alcohol use: No    Drug use: No   Other Topics Concern     Service No    Blood Transfusions No    Caffeine Concern No    Occupational Exposure No    Hobby Hazards No    Sleep Concern No    Stress Concern No    Weight Concern No    Special Diet No    Back Care No    Exercise Yes    Bike Helmet No    Seat Belt No    Self-Exams No     Social Determinants of Health     Financial Resource Strain:     Difficulty of Paying Living Expenses:    Food Insecurity:     Worried About Running Out of Food in the Last Year:     Ran Out of Food in the Last Year:    Transportation Needs:     Lack of Transportation (Medical):      Lack of Transportation (Non-Medical):    Physical Activity:     Days of Exercise per Week:     Minutes of Exercise per Session:    Stress:     Feeling of Stress :    Social Connections:     Frequency of Communication with Friends and Family:     Frequency of Social Gatherings with Friends and Family:     Attends Cheondoism Services:     Active Member of Clubs or Organizations:     Attends Club or Organization Meetings:     Marital Status:      History reviewed. No pertinent family history. Current Outpatient Medications   Medication Sig Dispense Refill    metFORMIN (GLUCOPHAGE) 1,000 mg tablet TAKE 1 TABLET BY MOUTH TWICE DAILY WITH  A  MEAL 180 Tab 3    glipiZIDE (GLUCOTROL) 5 mg tablet TAKE  One   TABLET BY MOUTH TWICE DAILY, twenty min  Before b-fast and dinner  STOP 10 mg dose 90 Tab 3    SITagliptin (JANUVIA) 100 mg tablet Take 1 Tab by mouth daily. 90 Tab 3    atorvastatin (LIPITOR) 40 mg tablet Take 1 tablet by mouth once daily 90 Tab 0    Lactobacillus acidophilus (PROBIOTIC PO) Take  by mouth.  aspirin delayed-release 81 mg tablet Take  by mouth daily.  (Patient not taking: Reported on 7/15/2021)       No Known Allergies    Objective:  Visit Vitals  /78   Pulse 63   Temp 98 °F (36.7 °C) (Oral)   Resp 16   Ht 5' 11\" (1.803 m)   Wt 256 lb (116.1 kg)   SpO2 97%   BMI 35.70 kg/m²     Physical Exam:   General appearance - alert, well appearing, and in no distress  Mental status - alert, oriented to person, place, and time  EYE-DAVIAN, EOMI, corneas normal, no foreign bodies  ENT-ENT exam normal, no neck nodes or sinus tenderness  Nose - normal and patent, no erythema, discharge or polyps  Mouth - mucous membranes moist, pharynx normal without lesions  Neck - supple, no significant adenopathy   Chest - clear to auscultation, no wheezes, rales or rhonchi, symmetric air entry   Heart - normal rate, regular rhythm, normal S1, S2, no murmurs, rubs, clicks or gallops   Abdomen - soft, nontender, nondistended, no masses or organomegaly  Lymph- no adenopathy palpable  Ext-peripheral pulses normal, no pedal edema, no clubbing or cyanosis  Skin-Warm and dry. no hyperpigmentation, vitiligo, or suspicious lesions  Neuro -alert, oriented, normal speech, no focal findings or movement disorder noted  Neck-normal C-spine, no tenderness, full ROM without pain  Feet-no nail deformities or callus formation with good pulses noted      Results for orders placed or performed in visit on 07/15/21   AMB POC LIPID PROFILE   Result Value Ref Range    Cholesterol (POC) 145     Triglycerides (POC) 56     HDL Cholesterol (POC) 66     Non-HDL Cholesterol 79     LDL Cholesterol (POC) 68 MG/DL    TChol/HDL Ratio (POC) 2.2    AMB POC HEMOGLOBIN A1C   Result Value Ref Range    Hemoglobin A1c (POC) 7.0 %   AMB POC URINALYSIS DIP STICK AUTO W/O MICRO   Result Value Ref Range    Color (UA POC) Zahra     Clarity (UA POC) Slightly Cloudy     Glucose (UA POC) Negative Negative    Bilirubin (UA POC) Negative Negative    Ketones (UA POC) Negative Negative    Specific gravity (UA POC) 1.030 1.001 - 1.035    Blood (UA POC) 4+ Negative    pH (UA POC) 5.0 4.6 - 8.0    Protein (UA POC) 3+ Negative    Urobilinogen (UA POC) 0.2 mg/dL 0.2 - 1    Nitrites (UA POC) Negative Negative    Leukocyte esterase (UA POC) Negative Negative       Assessment/Plan:    ICD-10-CM ICD-9-CM    1. Mixed hyperlipidemia  E78.2 272.2 AMB POC LIPID PROFILE      AMB POC HEMOGLOBIN A1C   2. Type II or unspecified type diabetes mellitus with neurological manifestations, uncontrolled(250.62) ([facility])  E11.49 250.62 AMB POC LIPID PROFILE      AMB POC HEMOGLOBIN A1C   3. Essential hypertension  I10 401.9    4. Hypercholesteremia  E78.00 272.0    5.  Hematuria of unknown cause  R31.9 599.70 AMB POC URINALYSIS DIP STICK AUTO W/O MICRO      CBC WITH AUTOMATED DIFF      CANCELED: AMB POC URINALYSIS DIP STICK MANUAL W/O MICRO     Orders Placed This Encounter    CBC WITH AUTOMATED DIFF     Standing Status:   Future     Standing Expiration Date:   7/15/2022    AMB POC LIPID PROFILE    AMB POC HEMOGLOBIN A1C    AMB POC URINALYSIS DIP STICK AUTO W/O MICRO     lose weight, increase physical activity, follow low fat diet, follow low salt diet, continue present plan, routine labs ordered,Take 81mg aspirin daily  Patient Instructions   MyChart Activation    Thank you for requesting access to MakuCell. Please follow the instructions below to securely access and download your online medical record. MakuCell allows you to send messages to your doctor, view your test results, renew your prescriptions, schedule appointments, and more. How Do I Sign Up? 1. In your internet browser, go to [email or web address]  2. Click on the First Time User? Click Here link in the Sign In box. You will be redirect to the New Member Sign Up page. 3. Enter your MakuCell Access Code exactly as it appears below. You will not need to use this code after youve completed the sign-up process. If you do not sign up before the expiration date, you must request a new code. MakuCell Access Code: [email or web address]  Expires: 2021  2:04 PM (This is the date your MakuCell access code will )    4. Enter the last four digits of your Social Security Number (xxxx) and Date of Birth (mm/dd/yyyy) as indicated and click Submit. You will be taken to the next sign-up page. 5. Create a MakuCell ID. This will be your MakuCell login ID and cannot be changed, so think of one that is secure and easy to remember. 6. Create a MakuCell password. You can change your password at any time. 7. Enter your Password Reset Question and Answer. This can be used at a later time if you forget your password. 8. Enter your e-mail address. You will receive e-mail notification when new information is available in [address] 9. Click Sign Up. You can now view and download portions of your medical record. 10. Click the Download Summary menu link to download a portable copy of your medical information.     Additional Information    If you have questions, please visit the Frequently Asked Questions section of the MyChart website at [email or web address] Remember, BOKUt is NOT to be used for urgent needs. For medical emergencies, dial 911. Follow-up and Dispositions    · Return in about 3 months (around 10/15/2021), or if symptoms worsen or fail to improve. I have reviewed with the patient details of the assessment and plan and all questions were answered. Relevent patient education was performed. The most recent lab findings were reviewed with the patient. An After Visit Summary was printed and given to the patient.

## 2021-07-15 NOTE — PROGRESS NOTES
1. Have you been to the ER, urgent care clinic since your last visit? Hospitalized since your last visit?no    2. Have you seen or consulted any other health care providers outside of the [address] [place] [address] since your last visit? Include any pap smears or colon screening. No    Chief Complaint   Patient presents with    Diabetes    Cholesterol Problem     Per Dr. Homer Garcia.,  verbal order given for needed amb poc labs.     3 most recent PHQ Screens 7/15/2021   PHQ Not Done -   Little interest or pleasure in doing things Not at all   Feeling down, depressed, irritable, or hopeless Not at all   Total Score PHQ 2 0

## 2021-08-16 RX ORDER — ATORVASTATIN CALCIUM 40 MG/1
TABLET, FILM COATED ORAL
Qty: 90 TABLET | Refills: 0 | Status: SHIPPED | OUTPATIENT
Start: 2021-08-16 | End: 2021-12-21 | Stop reason: SDUPTHER

## 2021-10-19 ENCOUNTER — OFFICE VISIT (OUTPATIENT)
Dept: INTERNAL MEDICINE CLINIC | Age: 71
End: 2021-10-19
Payer: MEDICARE

## 2021-10-19 VITALS
TEMPERATURE: 98.2 F | HEIGHT: 71 IN | SYSTOLIC BLOOD PRESSURE: 138 MMHG | DIASTOLIC BLOOD PRESSURE: 80 MMHG | OXYGEN SATURATION: 99 % | HEART RATE: 79 BPM | WEIGHT: 258 LBS | BODY MASS INDEX: 36.12 KG/M2 | RESPIRATION RATE: 20 BRPM

## 2021-10-19 DIAGNOSIS — Z00.00 MEDICARE ANNUAL WELLNESS VISIT, SUBSEQUENT: ICD-10-CM

## 2021-10-19 DIAGNOSIS — R31.9 HEMATURIA, UNSPECIFIED TYPE: ICD-10-CM

## 2021-10-19 DIAGNOSIS — E11.49 TYPE II OR UNSPECIFIED TYPE DIABETES MELLITUS WITH NEUROLOGICAL MANIFESTATIONS, UNCONTROLLED(250.62) (HCC): Primary | ICD-10-CM

## 2021-10-19 DIAGNOSIS — E78.00 HYPERCHOLESTEREMIA: ICD-10-CM

## 2021-10-19 DIAGNOSIS — E66.01 SEVERE OBESITY (BMI 35.0-35.9 WITH COMORBIDITY) (HCC): ICD-10-CM

## 2021-10-19 LAB
CHOLEST SERPL-MCNC: 171 MG/DL
GLUCOSE POC: 235 MG/DL
HBA1C MFR BLD HPLC: 8.3 %
HDLC SERPL-MCNC: 63 MG/DL
LDL CHOLESTEROL POC: 97 MG/DL
NON-HDL GOAL (POC): 107
TCHOL/HDL RATIO (POC): 2.7
TRIGL SERPL-MCNC: 53 MG/DL

## 2021-10-19 PROCEDURE — 2022F DILAT RTA XM EVC RTNOPTHY: CPT | Performed by: INTERNAL MEDICINE

## 2021-10-19 PROCEDURE — G0439 PPPS, SUBSEQ VISIT: HCPCS | Performed by: INTERNAL MEDICINE

## 2021-10-19 PROCEDURE — G8754 DIAS BP LESS 90: HCPCS | Performed by: INTERNAL MEDICINE

## 2021-10-19 PROCEDURE — 99214 OFFICE O/P EST MOD 30 MIN: CPT | Performed by: INTERNAL MEDICINE

## 2021-10-19 PROCEDURE — 80061 LIPID PANEL: CPT | Performed by: INTERNAL MEDICINE

## 2021-10-19 PROCEDURE — 83036 HEMOGLOBIN GLYCOSYLATED A1C: CPT | Performed by: INTERNAL MEDICINE

## 2021-10-19 PROCEDURE — 3017F COLORECTAL CA SCREEN DOC REV: CPT | Performed by: INTERNAL MEDICINE

## 2021-10-19 PROCEDURE — G8510 SCR DEP NEG, NO PLAN REQD: HCPCS | Performed by: INTERNAL MEDICINE

## 2021-10-19 PROCEDURE — G8536 NO DOC ELDER MAL SCRN: HCPCS | Performed by: INTERNAL MEDICINE

## 2021-10-19 PROCEDURE — G8427 DOCREV CUR MEDS BY ELIG CLIN: HCPCS | Performed by: INTERNAL MEDICINE

## 2021-10-19 PROCEDURE — 82962 GLUCOSE BLOOD TEST: CPT | Performed by: INTERNAL MEDICINE

## 2021-10-19 PROCEDURE — G8417 CALC BMI ABV UP PARAM F/U: HCPCS | Performed by: INTERNAL MEDICINE

## 2021-10-19 PROCEDURE — 1101F PT FALLS ASSESS-DOCD LE1/YR: CPT | Performed by: INTERNAL MEDICINE

## 2021-10-19 PROCEDURE — G8752 SYS BP LESS 140: HCPCS | Performed by: INTERNAL MEDICINE

## 2021-10-19 RX ORDER — PIOGLITAZONEHYDROCHLORIDE 45 MG/1
45 TABLET ORAL
Qty: 30 TABLET | Refills: 12 | Status: SHIPPED | OUTPATIENT
Start: 2021-10-19 | End: 2021-11-12 | Stop reason: DRUGHIGH

## 2021-10-19 NOTE — PROGRESS NOTES
Ester Thakur is a 79 y.o. male and presents with Diabetes and Cholesterol Problem  . Subjective:    Dyslipidemia Review:  Patient presents for evaluation of lipids. Compliance with treatment thus far has been excellent. A repeat fasting lipid profile was not done. The patient does not use medications that may worsen dyslipidemias (corticosteroids, progestins, anabolic steroids, amiodarone, cyclosporine, olanzapine). The patient exercises some    Diabetes Mellitus Review:  He has diabetes mellitus. Diabetic ROS - medication compliance: compliant all of the time, diabetic diet compliance: compliant all of the time, home glucose monitoring: is performed. Known diabetic complications: none  Cardiovascular risk factors: family history, dyslipidemia, diabetes mellitus, obesity, hypertension  Current diabetic medications include oral agents  Eye exam current (within one year): no  Weight trend: stable  Prior visit with dietician: no  Current diet: \"healthy\" diet  in general  Current exercise: walking  Current monitoring regimen: home blood tests - daily  Home blood sugar records: trend: stable  Any episodes of hypoglycemia? no  Is He on ACE inhibitor or angiotensin II receptor blocker? Yes     He has intermittent hematuria reported      Ester Thakur is a 79 y.o. male and presents for annual Medicare Wellness Visit. Problem List: Reviewed with patient and discussed risk factors. There is no problem list on file for this patient.       Current medical providers:  Patient Care Team:  Jerome Ernandez MD as PCP - General (Internal Medicine)  Jerome Ernandez MD as PCP - [address] [place] [address] Dr Lamas Provider    [facility] Jessica Vasquez: Reviewed with patient  Past Surgical History:   Procedure Laterality Date    COLONOSCOPY N/A 5/11/2021    COLONOSCOPY performed by Jose Juan Wright MD at [facility] ORTHOPAEDIC  2012    lumbar    HX PROSTATECTOMY  2012    HX UROLOGICAL  2012        SH: Reviewed with patient  Social History Tobacco Use    Smoking status: Never Smoker    Smokeless tobacco: Never Used   Vaping Use    Vaping Use: Never used   Substance Use Topics    Alcohol use: No    Drug use: No       FH: Reviewed with patient  History reviewed. No pertinent family history. Medications/Allergies: Reviewed with patient  Current Outpatient Medications on File Prior to Visit   Medication Sig Dispense Refill    atorvastatin (LIPITOR) 40 mg tablet Take 1 tablet by mouth once daily 90 Tablet 0    glipiZIDE (GLUCOTROL) 5 mg tablet TAKE  One   TABLET BY MOUTH TWICE DAILY, twenty min  Before b-fast and dinner  STOP 10 mg dose 90 Tab 3    SITagliptin (JANUVIA) 100 mg tablet Take 1 Tab by mouth daily. 90 Tab 3    Lactobacillus acidophilus (PROBIOTIC PO) Take  by mouth.  metFORMIN (GLUCOPHAGE) 1,000 mg tablet TAKE 1 TABLET BY MOUTH TWICE DAILY WITH  A  MEAL (Patient not taking: Reported on 10/19/2021) 180 Tab 3    aspirin delayed-release 81 mg tablet Take  by mouth daily. (Patient not taking: Reported on 7/15/2021)       No current facility-administered medications on file prior to visit. No Known Allergies    Objective:  Visit Vitals  /80 (BP 1 Location: Right arm, BP Patient Position: Sitting, BP Cuff Size: Adult)   Pulse 79   Temp 98.2 °F (36.8 °C) (Oral)   Resp 20   Ht 5' 11\" (1.803 m)   Wt 258 lb (117 kg)   SpO2 99%   BMI 35.98 kg/m²    Body mass index is 35.98 kg/m².     Assessment of cognitive impairment: Alert and oriented x 3    Depression Screen:   3 most recent PHQ Screens 10/19/2021   PHQ Not Done -   Little interest or pleasure in doing things Not at all   Feeling down, depressed, irritable, or hopeless Not at all   Total Score PHQ 2 0     Depression Review:  Patient is seen for screen of depression,denies anhedonia, weight gain, insomnia, hypersomnia, psychomotor agitation, psychomotor retardation, fatigue, feelings of worthlessness/guilt, difficulty concentrating, hopelessness, impaired memory and recurrent thoughts of death Treatment includes no medication   She denies recurrent thoughts of death and suicidal thoughts without plan. Fall Risk Assessment:    Fall Risk Assessment, last 12 mths 10/19/2021   Able to walk? Yes   Fall in past 12 months? 0   Do you feel unsteady? 0   Are you worried about falling 0       Functional Ability:   Does the patient exhibit a steady gait? yes   How long did it take the patient to get up and walk from a sitting position? seconds   Is the patient self reliant?  (ie can do own laundry, meals, household chores)  yes     Does the patient handle his/her own medications? yes     Does the patient handle his/her own money? yes     Is the patients home safe (ie good lighting, handrails on stairs and bath, etc.)? yes     Did you notice or did patient express any hearing difficulties? no     Did you notice or did patient express any vision difficulties?   no     Were distance and reading eye charts used? no       Advance Care Planning:   Patient was offered the opportunity to discuss advance care planning:  yes     Does patient have an Advance Directive:  no   If no, did you provide information on Caring Connections?   yes       Plan:      Orders Placed This Encounter    CBC W/O DIFF    METABOLIC PANEL, COMPREHENSIVE    AMB POC GLUCOSE BLOOD, BY GLUCOSE MONITORING DEVICE    AMB POC HEMOGLOBIN A1C    AMB POC LIPID PROFILE    pioglitazone (ACTOS) 45 mg tablet       Health Maintenance   Topic Date Due    Eye Exam Retinal or Dilated  Never done    Shingrix Vaccine Age 50> (1 of 2) Never done    Medicare Yearly Exam  08/25/2021    Flu Vaccine (1) Never done    Pneumococcal 65+ years (2 of 2 - PPSV23) 01/06/2022    Foot Exam Q1  04/07/2022    MICROALBUMIN Q1  05/05/2022    A1C test (Diabetic or Prediabetic)  07/15/2022    Lipid Screen  07/15/2022    DTaP/Tdap/Td series (2 - Td or Tdap) 01/24/2028    Colorectal Cancer Screening Combo  05/11/2031    Hepatitis C Screening  Completed    COVID-19 Vaccine  Completed       *Patient verbalized understanding and agreement with the plan. A copy of the After Visit Summary with personalized health plan was given to the patient today. Review of Systems  Constitutional: negative for fevers, chills, anorexia and weight loss  Eyes:   negative for visual disturbance and irritation  ENT:   negative for tinnitus,sore throat,nasal congestion,ear pains. hoarseness  Respiratory:  negative for cough, hemoptysis, dyspnea,wheezing  CV:   negative for chest pain, palpitations, lower extremity edema  GI:   negative for nausea, vomiting, diarrhea, abdominal pain,melena  Endo:               negative for polyuria,polydipsia,polyphagia,heat intolerance  Genitourinary:hematuria  Integument:  negative for rash and pruritus  Hematologic:  negative for easy bruising and gum/nose bleeding  Musculoskel: negative for myalgias, arthralgias, back pain, muscle weakness, joint pain  Neurological:  negative for headaches, dizziness, vertigo, memory problems and gait   Behavl/Psych: negative for feelings of anxiety, depression, mood changes    Past Medical History:   Diagnosis Date    Diabetes [facility])      Past Surgical History:   Procedure Laterality Date    COLONOSCOPY N/A 5/11/2021    COLON[facility] Yusef Thomason [facility] [address] HX ORTHOPAEDIC  2012    lumbar    HX PROSTATECTOMY  2012    HX UROLOGICAL  2012     Social History     Socioeconomic History    Marital status: SINGLE     Spouse name: Not on file    Number of children: 2    Years of education: Not on file    Highest education level: Not on file   Tobacco Use    Smoking status: Never Smoker    Smokeless tobacco: Never Used   Vaping Use    Vaping Use: Never used   Substance and Sexual Activity    Alcohol use: No    Drug use: No   Other Topics Concern     Service No    Blood Transfusions No    Caffeine Concern No    Occupational Exposure No    Hobby Hazards No    Sleep Concern No    Stress Concern No    Weight Concern No    Special Diet No    Back Care No    Exercise Yes    Bike Helmet No    Seat Belt No    Self-Exams No     Social Determinants of Health     Financial Resource Strain:     Difficulty of Paying Living Expenses:    Food Insecurity:     Worried About Running Out of Food in the Last Year:     Ran Out of Food in the Last Year:    Transportation Needs:     Lack of Transportation (Medical):  Lack of Transportation (Non-Medical):    Physical Activity:     Days of Exercise per Week:     Minutes of Exercise per Session:    Stress:     Feeling of Stress :    Social Connections:     Frequency of Communication with Friends and Family:     Frequency of Social Gatherings with Friends and Family:     Attends Rastafari Services:     Active Member of Clubs or Organizations:     Attends Club or Organization Meetings:     Marital Status:      History reviewed. No pertinent family history. Current Outpatient Medications   Medication Sig Dispense Refill    pioglitazone (ACTOS) 45 mg tablet Take 1 Tablet by mouth nightly. 30 Tablet 12    atorvastatin (LIPITOR) 40 mg tablet Take 1 tablet by mouth once daily 90 Tablet 0    glipiZIDE (GLUCOTROL) 5 mg tablet TAKE  One   TABLET BY MOUTH TWICE DAILY, twenty min  Before b-fast and dinner  STOP 10 mg dose 90 Tab 3    SITagliptin (JANUVIA) 100 mg tablet Take 1 Tab by mouth daily. 90 Tab 3    Lactobacillus acidophilus (PROBIOTIC PO) Take  by mouth.  metFORMIN (GLUCOPHAGE) 1,000 mg tablet TAKE 1 TABLET BY MOUTH TWICE DAILY WITH  A  MEAL (Patient not taking: Reported on 10/19/2021) 180 Tab 3    aspirin delayed-release 81 mg tablet Take  by mouth daily.  (Patient not taking: Reported on 7/15/2021)       No Known Allergies    Objective:  Visit Vitals  /80 (BP 1 Location: Right arm, BP Patient Position: Sitting, BP Cuff Size: Adult)   Pulse 79   Temp 98.2 °F (36.8 °C) (Oral)   Resp 20   Ht 5' 11\" (1.803 m)   Wt 258 lb (117 kg)   SpO2 99%   BMI 35.98 kg/m²     Physical Exam:   General appearance - alert, well appearing, and in no distress  Mental status - alert, oriented to person, place, and time  EYE-DAVIAN, EOMI, corneas normal, no foreign bodies  ENT-ENT exam normal, no neck nodes or sinus tenderness  Nose - normal and patent, no erythema, discharge or polyps  Mouth - mucous membranes moist, pharynx normal without lesions  Neck - supple, no significant adenopathy   Chest - clear to auscultation, no wheezes, rales or rhonchi, symmetric air entry   Heart - normal rate, regular rhythm, normal S1, S2, no murmurs, rubs, clicks or gallops   Abdomen - soft, nontender, nondistended, no masses or organomegaly  Lymph- no adenopathy palpable  Ext-peripheral pulses normal, no pedal edema, no clubbing or cyanosis  Skin-Warm and dry. no hyperpigmentation, vitiligo, or suspicious lesions  Neuro -alert, oriented, normal speech, no focal findings or movement disorder noted  Neck-normal C-spine, no tenderness, full ROM without pain  Feet-no nail deformities or callus formation with good pulses noted      Results for orders placed or performed in visit on 07/15/21   CBC WITH AUTOMATED DIFF   Result Value Ref Range    WBC 5.7 4.1 - 11.1 K/uL    RBC 4.01 (L) 4.10 - 5.70 M/uL    HGB 12.4 12.1 - 17.0 g/dL    HCT 38.9 36.6 - 50.3 %    MCV 97.0 80.0 - 99.0 FL    MCH 30.9 26.0 - 34.0 PG    MCHC 31.9 30.0 - 36.5 g/dL    RDW 11.9 11.5 - 14.5 %    PLATELET 055 897 - 228 K/uL    MPV 12.0 8.9 - 12.9 FL    NRBC 0.0 0  WBC    ABSOLUTE NRBC 0.00 0.00 - 0.01 K/uL    NEUTROPHILS 65 32 - 75 %    LYMPHOCYTES 25 12 - 49 %    MONOCYTES 7 5 - 13 %    EOSINOPHILS 2 0 - 7 %    BASOPHILS 1 0 - 1 %    IMMATURE GRANULOCYTES 0 0.0 - 0.5 %    ABS. NEUTROPHILS 3.7 1.8 - 8.0 K/UL    ABS. LYMPHOCYTES 1.4 0.8 - 3.5 K/UL    ABS. MONOCYTES 0.4 0.0 - 1.0 K/UL    ABS. EOSINOPHILS 0.1 0.0 - 0.4 K/UL    ABS. BASOPHILS 0.1 0.0 - 0.1 K/UL    ABS. IMM. GRANS. 0.0 0.00 - 0.04 K/UL    DF AUTOMATED     AMB POC LIPID PROFILE   Result Value Ref Range    Cholesterol (POC) 145     Triglycerides (POC) 56     HDL Cholesterol (POC) 66     Non-HDL Cholesterol 79     LDL Cholesterol (POC) 68 MG/DL    TChol/HDL Ratio (POC) 2.2    AMB POC HEMOGLOBIN A1C   Result Value Ref Range    Hemoglobin A1c (POC) 7.0 %   AMB POC URINALYSIS DIP STICK AUTO W/O MICRO   Result Value Ref Range    Color (UA POC) Zahra     Clarity (UA POC) Slightly Cloudy     Glucose (UA POC) Negative Negative    Bilirubin (UA POC) Negative Negative    Ketones (UA POC) Negative Negative    Specific gravity (UA POC) 1.030 1.001 - 1.035    Blood (UA POC) 4+ Negative    pH (UA POC) 5.0 4.6 - 8.0    Protein (UA POC) 3+ Negative    Urobilinogen (UA POC) 0.2 mg/dL 0.2 - 1    Nitrites (UA POC) Negative Negative    Leukocyte esterase (UA POC) Negative Negative       Assessment/Plan:    ICD-10-CM ICD-9-CM    1. Type II or unspecified type diabetes mellitus with neurological manifestations, uncontrolled(250.62) ([facility])  E11.49 250.62 AMB POC GLUCOSE BLOOD, BY GLUCOSE MONITORING DEVICE      AMB POC HEMOGLOBIN A1C      CBC W/O DIFF      METABOLIC PANEL, COMPREHENSIVE      CBC W/O DIFF      METABOLIC PANEL, COMPREHENSIVE   2. Severe obesity (BMI 35.0-35.9 with comorbidity) ([facility])  E66.01 278.01     Z68.35 V85.35    3. Hypercholesteremia  E78.00 272.0 AMB POC LIPID PROFILE   4.  Hematuria, unspecified type  R31.9 599.70 CBC W/O DIFF      METABOLIC PANEL, COMPREHENSIVE      CBC W/O DIFF      METABOLIC PANEL, COMPREHENSIVE     Orders Placed This Encounter    CBC W/O DIFF     Standing Status:   Future     Number of Occurrences:   1     Standing Expiration Date:   95/32/5520    METABOLIC PANEL, COMPREHENSIVE     Standing Status:   Future     Number of Occurrences:   1     Standing Expiration Date:   10/19/2022    AMB POC GLUCOSE BLOOD, BY GLUCOSE MONITORING DEVICE    AMB POC HEMOGLOBIN A1C    AMB POC LIPID PROFILE    pioglitazone (ACTOS) 45 mg tablet     Sig: Take 1 Tablet by mouth nightly. Dispense:  30 Tablet     Refill:  12     lose weight, increase physical activity, follow low fat diet, follow low salt diet, continue present plan, routine labs ordered, call if any problems  Patient Instructions   MyChart Activation    Thank you for requesting access to [employer]. Please follow the instructions below to securely access and download your online medical record. [employer] allows you to send messages to your doctor, view your test results, renew your prescriptions, schedule appointments, and more. How Do I Sign Up? 1. In your internet browser, go to [email or web address]  2. Click on the First Time User? Click Here link in the Sign In box. You will be redirect to the New Member Sign Up page. 3. Enter your [employer] Access Code exactly as it appears below. You will not need to use this code after youve completed the sign-up process. If you do not sign up before the expiration date, you must request a new code. [employer] Access Code: [email or web address]  Expires: 12/3/2021  8:05 AM (This is the date your [employer] access code will )    4. Enter the last four digits of your Social Security Number (xxxx) and Date of Birth (mm/dd/yyyy) as indicated and click Submit. You will be taken to the next sign-up page. 5. Create a [employer] ID. This will be your [employer] login ID and cannot be changed, so think of one that is secure and easy to remember. 6. Create a [employer] password. You can change your password at any time. 7. Enter your Password Reset Question and Answer. This can be used at a later time if you forget your password. 8. Enter your e-mail address. You will receive e-mail notification when new information is available in [address] 9. Click Sign Up. You can now view and download portions of your medical record.   10. Click the Download Summary menu link to download a portable copy of your medical information. Additional Information    If you have questions, please visit the Frequently Asked Questions section of the [employer] website at [email or web address] Remember, [email or web address] is NOT to be used for urgent needs. For medical emergencies, dial 911. Follow-up and Dispositions    · Return in about 3 months (around 1/19/2022), or if symptoms worsen or fail to improve. I have reviewed with the patient details of the assessment and plan and all questions were answered. Relevent patient education was performed    An After Visit Summary was printed and given to the patient.

## 2021-10-19 NOTE — PROGRESS NOTES
Chief Complaint   Patient presents with    Diabetes    Cholesterol Problem     3 most recent PHQ Screens 10/19/2021   PHQ Not Done -   Little interest or pleasure in doing things Not at all   Feeling down, depressed, irritable, or hopeless Not at all   Total Score PHQ 2 0     1. Have you been to the ER, urgent care clinic since your last visit? Hospitalized since your last visit?no    2. Have you seen or consulted any other health care providers outside of the [address] [place] [address] since your last visit? Include any pap smears or colon screening.  yes

## 2021-10-19 NOTE — PATIENT INSTRUCTIONS
[employer] Activation    Thank you for requesting access to [employer]. Please follow the instructions below to securely access and download your online medical record. [employer] allows you to send messages to your doctor, view your test results, renew your prescriptions, schedule appointments, and more. How Do I Sign Up? 1. In your internet browser, go to [email or web address]  2. Click on the First Time User? Click Here link in the Sign In box. You will be redirect to the New Member Sign Up page. 3. Enter your [employer] Access Code exactly as it appears below. You will not need to use this code after youve completed the sign-up process. If you do not sign up before the expiration date, you must request a new code. [employer] Access Code: [email or web address]  Expires: 12/3/2021  8:05 AM (This is the date your [employer] access code will )    4. Enter the last four digits of your Social Security Number (xxxx) and Date of Birth (mm/dd/yyyy) as indicated and click Submit. You will be taken to the next sign-up page. 5. Create a [employer] ID. This will be your [employer] login ID and cannot be changed, so think of one that is secure and easy to remember. 6. Create a [employer] password. You can change your password at any time. 7. Enter your Password Reset Question and Answer. This can be used at a later time if you forget your password. 8. Enter your e-mail address. You will receive e-mail notification when new information is available in [address] 9. Click Sign Up. You can now view and download portions of your medical record. 10. Click the Download Summary menu link to download a portable copy of your medical information. Additional Information    If you have questions, please visit the Frequently Asked Questions section of the [employer] website at [email or web address] Remember, [employer] is NOT to be used for urgent needs. For medical emergencies, dial 911.

## 2021-11-01 DIAGNOSIS — R31.9 HEMATURIA OF UNKNOWN ETIOLOGY: Primary | ICD-10-CM

## 2021-11-05 RX ORDER — CEPHALEXIN 500 MG/1
500 CAPSULE ORAL 4 TIMES DAILY
Qty: 28 CAPSULE | Refills: 0 | Status: SHIPPED | OUTPATIENT
Start: 2021-11-05 | End: 2021-11-12

## 2021-11-06 LAB
ALBUMIN SERPL-MCNC: 3.9 G/DL (ref 3.8–4.8)
ALBUMIN/CREAT UR: 11 MG/G CREAT (ref 0–29)
ALBUMIN/GLOB SERPL: 1.1 {RATIO} (ref 1.2–2.2)
ALP SERPL-CCNC: 58 IU/L (ref 44–121)
ALT SERPL-CCNC: 17 IU/L (ref 0–44)
AST SERPL-CCNC: 37 IU/L (ref 0–40)
BASOPHILS # BLD AUTO: 0.1 X10E3/UL (ref 0–0.2)
BASOPHILS NFR BLD AUTO: 2 %
BILIRUB SERPL-MCNC: 0.5 MG/DL (ref 0–1.2)
BUN SERPL-MCNC: 13 MG/DL (ref 8–27)
BUN/CREAT SERPL: 15 (ref 10–24)
CALCIUM SERPL-MCNC: 9.2 MG/DL (ref 8.6–10.2)
CHLORIDE SERPL-SCNC: 104 MMOL/L (ref 96–106)
CHOLEST SERPL-MCNC: 176 MG/DL (ref 100–199)
CO2 SERPL-SCNC: 27 MMOL/L (ref 20–29)
CREAT SERPL-MCNC: 0.88 MG/DL (ref 0.76–1.27)
CREAT UR-MCNC: 114.9 MG/DL
EOSINOPHIL # BLD AUTO: 0.2 X10E3/UL (ref 0–0.4)
EOSINOPHIL NFR BLD AUTO: 3 %
ERYTHROCYTE [DISTWIDTH] IN BLOOD BY AUTOMATED COUNT: 12.1 % (ref 11.6–15.4)
EST. AVERAGE GLUCOSE BLD GHB EST-MCNC: 200 MG/DL
GLOBULIN SER CALC-MCNC: 3.5 G/DL (ref 1.5–4.5)
GLUCOSE SERPL-MCNC: 206 MG/DL (ref 65–99)
HBA1C MFR BLD: 8.6 % (ref 4.8–5.6)
HCT VFR BLD AUTO: 35.4 % (ref 37.5–51)
HDLC SERPL-MCNC: 59 MG/DL
HGB BLD-MCNC: 11.5 G/DL (ref 13–17.7)
IMM GRANULOCYTES # BLD AUTO: 0 X10E3/UL (ref 0–0.1)
IMM GRANULOCYTES NFR BLD AUTO: 0 %
IMP & REVIEW OF LAB RESULTS: NORMAL
LDLC SERPL CALC-MCNC: 105 MG/DL (ref 0–99)
LYMPHOCYTES # BLD AUTO: 2.1 X10E3/UL (ref 0.7–3.1)
LYMPHOCYTES NFR BLD AUTO: 34 %
MCH RBC QN AUTO: 31.2 PG (ref 26.6–33)
MCHC RBC AUTO-ENTMCNC: 32.5 G/DL (ref 31.5–35.7)
MCV RBC AUTO: 96 FL (ref 79–97)
MICROALBUMIN UR-MCNC: 12.3 UG/ML
MONOCYTES # BLD AUTO: 0.4 X10E3/UL (ref 0.1–0.9)
MONOCYTES NFR BLD AUTO: 7 %
NEUTROPHILS # BLD AUTO: 3.4 X10E3/UL (ref 1.4–7)
NEUTROPHILS NFR BLD AUTO: 54 %
PLATELET # BLD AUTO: 262 X10E3/UL (ref 150–450)
POTASSIUM SERPL-SCNC: 5.1 MMOL/L (ref 3.5–5.2)
PROT SERPL-MCNC: 7.4 G/DL (ref 6–8.5)
RBC # BLD AUTO: 3.69 X10E6/UL (ref 4.14–5.8)
SODIUM SERPL-SCNC: 141 MMOL/L (ref 134–144)
TRIGL SERPL-MCNC: 64 MG/DL (ref 0–149)
VLDLC SERPL CALC-MCNC: 12 MG/DL (ref 5–40)
WBC # BLD AUTO: 6.2 X10E3/UL (ref 3.4–10.8)

## 2021-11-12 ENCOUNTER — OFFICE VISIT (OUTPATIENT)
Dept: ENDOCRINOLOGY | Age: 71
End: 2021-11-12
Payer: MEDICARE

## 2021-11-12 VITALS
DIASTOLIC BLOOD PRESSURE: 74 MMHG | TEMPERATURE: 97.8 F | WEIGHT: 259.4 LBS | RESPIRATION RATE: 18 BRPM | HEIGHT: 71 IN | BODY MASS INDEX: 36.31 KG/M2 | HEART RATE: 74 BPM | OXYGEN SATURATION: 97 % | SYSTOLIC BLOOD PRESSURE: 121 MMHG

## 2021-11-12 DIAGNOSIS — E11.65 UNCONTROLLED TYPE 2 DIABETES MELLITUS WITH HYPERGLYCEMIA (HCC): Primary | ICD-10-CM

## 2021-11-12 DIAGNOSIS — E78.2 MIXED HYPERLIPIDEMIA: ICD-10-CM

## 2021-11-12 DIAGNOSIS — I10 ESSENTIAL HYPERTENSION: ICD-10-CM

## 2021-11-12 PROCEDURE — G8432 DEP SCR NOT DOC, RNG: HCPCS | Performed by: INTERNAL MEDICINE

## 2021-11-12 PROCEDURE — G8417 CALC BMI ABV UP PARAM F/U: HCPCS | Performed by: INTERNAL MEDICINE

## 2021-11-12 PROCEDURE — 3052F HG A1C>EQUAL 8.0%<EQUAL 9.0%: CPT | Performed by: INTERNAL MEDICINE

## 2021-11-12 PROCEDURE — G8536 NO DOC ELDER MAL SCRN: HCPCS | Performed by: INTERNAL MEDICINE

## 2021-11-12 PROCEDURE — 99214 OFFICE O/P EST MOD 30 MIN: CPT | Performed by: INTERNAL MEDICINE

## 2021-11-12 PROCEDURE — G8754 DIAS BP LESS 90: HCPCS | Performed by: INTERNAL MEDICINE

## 2021-11-12 PROCEDURE — G8427 DOCREV CUR MEDS BY ELIG CLIN: HCPCS | Performed by: INTERNAL MEDICINE

## 2021-11-12 PROCEDURE — 1101F PT FALLS ASSESS-DOCD LE1/YR: CPT | Performed by: INTERNAL MEDICINE

## 2021-11-12 PROCEDURE — G8752 SYS BP LESS 140: HCPCS | Performed by: INTERNAL MEDICINE

## 2021-11-12 PROCEDURE — 2022F DILAT RTA XM EVC RTNOPTHY: CPT | Performed by: INTERNAL MEDICINE

## 2021-11-12 PROCEDURE — 3017F COLORECTAL CA SCREEN DOC REV: CPT | Performed by: INTERNAL MEDICINE

## 2021-11-12 RX ORDER — GLIPIZIDE 5 MG/1
TABLET ORAL
Qty: 90 TABLET | Refills: 3 | Status: SHIPPED | OUTPATIENT
Start: 2021-11-12 | End: 2022-10-03 | Stop reason: SDUPTHER

## 2021-11-12 RX ORDER — PIOGLITAZONEHYDROCHLORIDE 30 MG/1
30 TABLET ORAL DAILY
Qty: 90 TABLET | Refills: 3 | Status: SHIPPED | OUTPATIENT
Start: 2021-11-12 | End: 2022-10-03 | Stop reason: SDUPTHER

## 2021-11-12 RX ORDER — METFORMIN HYDROCHLORIDE 1000 MG/1
TABLET ORAL
Qty: 90 TABLET | Refills: 3 | Status: SHIPPED | OUTPATIENT
Start: 2021-11-12 | End: 2022-10-03 | Stop reason: SDUPTHER

## 2021-11-12 NOTE — PATIENT INSTRUCTIONS
SPECIFIC INSTRUCTIONS BELOW         DRINK water   Do not skip meals  Do not eat in between meals    Reduce carbs- pasta, rice, potatoes, bread   Do not drink juices or sodas, even they are calorie zero or diet drinks  Do not eat peanut butter     Do not eat sugar free cookies and cakes   Do not eat peaches, oranges, pineapples, raisins, grapes , canteloupe , honey dew and fruit medleys      -----------------------------------------------------------------------------------------------------------------      Stay on  Metformin 1000 mg  HALF  pill   Twice   a day with meals     Stay on  glipizide 5 mg , twenty minutes before b-fast and dinner - stop 10 mg dose     Stay on januvia  100 mg a day     Decrease  Pioglitazone 30 mg  A day           -------------PAY ATTENTION TO THESE GENERAL INSTRUCTIONS -----------------      - The medications prescribed at this visit will not be available at pharmacy until 6 pm       - YOUR MED LIST IS NOT UP TO DATE AS SOME CHANGES ARE BEING MADE AFTER THE VISIT - FOLLOW SPECIFIC INSTRUCTIONS  ABOVE     -ANY tests other than blood work, which you opt to do  outside the  [facility] facilities, you are responsible for prior authorizations if  required    - [address] UP TO DATE ON YOUR AVS- PLEASE IGNORE     Results     *Normal results will not be notified by a phone call starting January 1 2021   *If you have an upcoming visit, the results will be discussed at the visit   *Please sign up for MY CHART if you want access to your lab and test results  *Abnormal results which require immediate attention will be notified by phone call   *Abnormal results which do not require immediate assistance will be notified in 1-2 weeks       Refills    -    have your pharmacy send us a refill request . Refills are done max for one year and a visit is a must before refills are extended    Follow up appointments -  highly encourage you to make it when you are checking out. We can accommodate you into the schedule based on your clinical situation, but not for extending refills beyond a year. Labs are important to give refills and is important to get labs before the visit     Phone calls  -  Allow  24 hrs.  for non-urgent calls to be returned  Prior authorization - It may take 2-4 weeks to process  Forms  -  FMLA, DMV etc., will take up to 2 weeks to process  Cancellations - please notify the office 2 days in advance   Samples  - will only be dispensed at visits       If not showing for the appointments and cancelling appointments within 24 hours are kept track of and three  of such situations in  two consecutive years will likely be considered for termination from the practice    -------------------------------------------------------------------------------------------------------------------

## 2021-11-12 NOTE — PROGRESS NOTES
HISTORY OF PRESENT ILLNESS  Magaly Leiva is a 79 y.o. male. Patient here for    VISIT  AFTER  initial visit of Type 2 diabetes mellitus  From May 2021     He had hematuria, severe started in May 2021   He had episodic issues and that turned out to be  Persistent 3 weeks ago     He had cystoscopy - no bladder mass   He is under stress, he is not eating right   No meter or not checking        May 2021     Weight is stable   He had malaena - had colonoscopy - polyps removed   He again reduced glipizide to only one pill a day , @ 10 mg a day       Dec 2020     GAINED 30 LBS   Muscle bulk - eating a lot he says   No meter         OLD HISTORY  :    Referred : by self/pcp  H/o diabetes for few years   Current A1C is 8 % and symptoms/problems include none   Current diabetic medications include metformin and glipizide . Review of Systems   None       Physical Exam   Constitutional: She is oriented to person, place, and time. She appears well-developed and well-nourished. Psychiatric: She has a normal mood and affect.        Lab Results   Component Value Date/Time    Hemoglobin A1c 8.6 (H) 11/05/2021 12:00 AM    Hemoglobin A1c 7.5 (H) 05/05/2021 12:00 AM    Hemoglobin A1c 6.9 (H) 12/08/2020 12:00 AM    Hemoglobin A1c, External 6.8 01/04/2018 12:00 AM    Glucose 206 (H) 11/05/2021 12:00 AM    Glucose (POC) 114 05/11/2021 09:59 AM    Glucose  10/19/2021 08:41 AM    Microalb/Creat ratio (ug/mg creat.) 11 11/05/2021 12:00 AM    LDL, calculated 105 (H) 11/05/2021 12:00 AM    Creatinine 0.88 11/05/2021 12:00 AM      Lab Results   Component Value Date/Time    Cholesterol, total 176 11/05/2021 12:00 AM    Cholesterol (POC) 171 10/19/2021 08:42 AM    HDL Cholesterol 59 11/05/2021 12:00 AM    LDL, calculated 105 (H) 11/05/2021 12:00 AM    LDL Cholesterol (POC) 97 10/19/2021 08:42 AM    LDL-C, External 80 01/04/2018 12:00 AM    Triglyceride 64 11/05/2021 12:00 AM    Triglycerides (POC) 53 10/19/2021 08:42 AM Lab Results   Component Value Date/Time    ALT (SGPT) 17 11/05/2021 12:00 AM    Alk. phosphatase 58 11/05/2021 12:00 AM    Bilirubin, total 0.5 11/05/2021 12:00 AM    Albumin 3.9 11/05/2021 12:00 AM    Protein, total 7.4 11/05/2021 12:00 AM    PLATELET 779 39/35/0879 12:00 AM     Lab Results   Component Value Date/Time    GFR est non-AA 87 11/05/2021 12:00 AM    GFR est  11/05/2021 12:00 AM    Creatinine 0.88 11/05/2021 12:00 AM    BUN 13 11/05/2021 12:00 AM    Sodium 141 11/05/2021 12:00 AM    Potassium 5.1 11/05/2021 12:00 AM    Chloride 104 11/05/2021 12:00 AM    CO2 27 11/05/2021 12:00 AM             ASSESSMENT and PLAN    1. Type 2 DM, uncontrolled :  a1c is   8.6  %     From Nov 2021     Compared to    7.5 %     From   May 2021   Compared to     6.9 %    FROM DEC 2020    COMPARED TO  8.3 %    From today  August 24 th 2020     Compared to    8 %  From nov 2019 Nov 2021  -     Loss of diabetes control - stressed out and eating  Sweets   metformin is stopped  By him  ( half dose )  Stay on   Glipizide 5 mg bid   Stay on actos  30 mg  ( 45 mg stop) - alerted there was an association with bladder cancer in smokers. . recent study the       May 2021   He is not surprised because he  Had decreased the number of pills  To once a day   Educated him about time of action  Profile of meds   Glipizide 5 mg bid and metformin and  januvia - stay on     [id number] [address]   Gained a lot of weight  - does not seem fluid gain  He says he ate a lot and worked out a lot - muscle bulk   Not using anabolics   He understands that he has to lose weight to prevent weight on his joints   No meter   Stay on januvia,  Metformin and glipizide ( he is using it only once a day )  So, rreduced dose of glipizide to 5 mg bid         2. Hypoglycemia :  Educated on treating the hypoglycemia. Discussed Glipizide  use and prescribed    3. HTN : continue current care. 4.  Dyslipidemia : continue lipitor . 5.  Hematuria  -  Under investigation for etiology   Basic work up did not reveal much         Reviewed results with patient and discussed the labs being ordered today/bnv  Patient voiced understanding of plan of care

## 2021-11-12 NOTE — LETTER
11/14/2021    Patient: aMgaly Leiva   YOB: 1950   Date of Visit: 11/12/2021     Falguni Kellogg MD  [address] [address]  [address] [facility] MD [address]  [address] [facility] [address]  Via Fax: [phone]    Dear MD Barb Myers MD,      Thank you for referring Mr. Magaly Leiva to [address] [place] [address] for evaluation. My notes for this consultation are attached. If you have questions, please do not hesitate to call me. I look forward to following your patient along with you.       Sincerely,    Andi Bagley MD

## 2021-12-08 RX ORDER — SILDENAFIL 100 MG/1
100 TABLET, FILM COATED ORAL AS NEEDED
Qty: 30 TABLET | Refills: 12 | Status: SHIPPED | OUTPATIENT
Start: 2021-12-08 | End: 2022-10-03

## 2021-12-21 RX ORDER — ATORVASTATIN CALCIUM 40 MG/1
TABLET, FILM COATED ORAL
Qty: 90 TABLET | Refills: 0 | Status: SHIPPED | OUTPATIENT
Start: 2021-12-21 | End: 2022-07-19

## 2022-01-19 ENCOUNTER — OFFICE VISIT (OUTPATIENT)
Dept: INTERNAL MEDICINE CLINIC | Age: 72
End: 2022-01-19
Payer: MEDICARE

## 2022-01-19 VITALS
HEIGHT: 71 IN | RESPIRATION RATE: 16 BRPM | OXYGEN SATURATION: 98 % | WEIGHT: 257 LBS | SYSTOLIC BLOOD PRESSURE: 138 MMHG | TEMPERATURE: 97.8 F | DIASTOLIC BLOOD PRESSURE: 80 MMHG | HEART RATE: 71 BPM | BODY MASS INDEX: 35.98 KG/M2

## 2022-01-19 DIAGNOSIS — I10 ESSENTIAL HYPERTENSION: ICD-10-CM

## 2022-01-19 DIAGNOSIS — E11.49 TYPE II OR UNSPECIFIED TYPE DIABETES MELLITUS WITH NEUROLOGICAL MANIFESTATIONS, UNCONTROLLED(250.62) (HCC): Primary | ICD-10-CM

## 2022-01-19 DIAGNOSIS — E78.00 HYPERCHOLESTEREMIA: ICD-10-CM

## 2022-01-19 DIAGNOSIS — R31.9 HEMATURIA, UNSPECIFIED TYPE: ICD-10-CM

## 2022-01-19 DIAGNOSIS — E66.01 SEVERE OBESITY (BMI 35.0-35.9 WITH COMORBIDITY) (HCC): ICD-10-CM

## 2022-01-19 LAB
CHOLEST SERPL-MCNC: 190 MG/DL
GLUCOSE POC: 130 MG/DL
HBA1C MFR BLD HPLC: 6.8 %
HDLC SERPL-MCNC: 68 MG/DL
LDL CHOLESTEROL POC: NORMAL MG/DL
NONHDLC SERPL-MCNC: 123 MG/DL
TCHOL/HDL RATIO (POC): 2.8
TRIGL SERPL-MCNC: 45 MG/DL

## 2022-01-19 PROCEDURE — 80061 LIPID PANEL: CPT | Performed by: INTERNAL MEDICINE

## 2022-01-19 PROCEDURE — 3017F COLORECTAL CA SCREEN DOC REV: CPT | Performed by: INTERNAL MEDICINE

## 2022-01-19 PROCEDURE — G8427 DOCREV CUR MEDS BY ELIG CLIN: HCPCS | Performed by: INTERNAL MEDICINE

## 2022-01-19 PROCEDURE — 82962 GLUCOSE BLOOD TEST: CPT | Performed by: INTERNAL MEDICINE

## 2022-01-19 PROCEDURE — 83036 HEMOGLOBIN GLYCOSYLATED A1C: CPT | Performed by: INTERNAL MEDICINE

## 2022-01-19 PROCEDURE — G8536 NO DOC ELDER MAL SCRN: HCPCS | Performed by: INTERNAL MEDICINE

## 2022-01-19 PROCEDURE — 1101F PT FALLS ASSESS-DOCD LE1/YR: CPT | Performed by: INTERNAL MEDICINE

## 2022-01-19 PROCEDURE — 99214 OFFICE O/P EST MOD 30 MIN: CPT | Performed by: INTERNAL MEDICINE

## 2022-01-19 PROCEDURE — G8752 SYS BP LESS 140: HCPCS | Performed by: INTERNAL MEDICINE

## 2022-01-19 PROCEDURE — 3046F HEMOGLOBIN A1C LEVEL >9.0%: CPT | Performed by: INTERNAL MEDICINE

## 2022-01-19 PROCEDURE — 2022F DILAT RTA XM EVC RTNOPTHY: CPT | Performed by: INTERNAL MEDICINE

## 2022-01-19 PROCEDURE — G8754 DIAS BP LESS 90: HCPCS | Performed by: INTERNAL MEDICINE

## 2022-01-19 PROCEDURE — G8417 CALC BMI ABV UP PARAM F/U: HCPCS | Performed by: INTERNAL MEDICINE

## 2022-01-19 PROCEDURE — G8510 SCR DEP NEG, NO PLAN REQD: HCPCS | Performed by: INTERNAL MEDICINE

## 2022-01-19 RX ORDER — SILDENAFIL 100 MG/1
100 TABLET, FILM COATED ORAL AS NEEDED
Qty: 8 TABLET | Refills: 12 | Status: SHIPPED | OUTPATIENT
Start: 2022-01-19 | End: 2022-10-03

## 2022-01-19 NOTE — PROGRESS NOTES
Edwin Ramírez is a 70 y.o. male and presents with Diabetes and Cholesterol Problem  . Subjective:    Dyslipidemia Review:  Patient presents for evaluation of lipids. Compliance with treatment thus far has been excellent. A repeat fasting lipid profile was not done. The patient does not use medications that may worsen dyslipidemias . The patient exercises sporadically. Diabetes Mellitus Review:  He has diabetes mellitus. Diabetic ROS - medication compliance: compliant all of the time, diabetic diet compliance: compliant all of the time, home glucose monitoring: is performed. Known diabetic complications: none  Cardiovascular risk factors: family history, dyslipidemia, diabetes mellitus, obesity, hypertension  Current diabetic medications include oral agents/insulin   Eye exam current (within one year): no  Weight trend: stable  Prior visit with dietician: no  Current diet: \"healthy\" diet  in general  Current exercise: walking  Current monitoring regimen: home blood tests - daily  Home blood sugar records: trend: stable  Any episodes of hypoglycemia? no  Lab review: orders written for new lab studies as appropriate; see orders. Erectile dysfunction Review[de-identified]  Patient complains of difficulty in maintaining an adequate erection. He states that his present medication is not helping thus far. He states the bleeding has stopped from the urine  He was seen at [facility]. Review of Systems  Constitutional: negative for fevers, chills, anorexia and weight loss  Eyes:   negative for visual disturbance and irritation  ENT:   negative for tinnitus,sore throat,nasal congestion,ear pains. hoarseness  Respiratory:  negative for cough, hemoptysis, dyspnea,wheezing  CV:   negative for chest pain, palpitations, lower extremity edema  GI:   negative for nausea, vomiting, diarrhea, abdominal pain,melena  Endo:               negative for polyuria,polydipsia,polyphagia,heat intolerance  Genitourinary: negative for frequency, dysuria and hematuria  Integument:  negative for rash and pruritus  Hematologic:  negative for easy bruising and gum/nose bleeding  Musculoskel: negative for myalgias, arthralgias, back pain, muscle weakness, joint pain  Neurological:  negative for headaches, dizziness, vertigo, memory problems and gait   Behavl/Psych: negative for feelings of anxiety, depression, mood changes    Past Medical History:   Diagnosis Date    Diabetes [facility])      Past Surgical History:   Procedure Laterality Date    COLONOSCOPY N/A 5/11/2021    COLONOSCOPY performed by Cedric Colon MD at [facility] HX ORTHOPAEDIC  2012    lumbar    HX PROSTATECTOMY  2012    HX UROLOGICAL  2012     Social History     Socioeconomic History    Marital status: SINGLE    Number of children: 2   Tobacco Use    Smoking status: Never Smoker    Smokeless tobacco: Never Used   Vaping Use    Vaping Use: Never used   Substance and Sexual Activity    Alcohol use: No    Drug use: No   Other Topics Concern     Service No    Blood Transfusions No    Caffeine Concern No    Occupational Exposure No    Hobby Hazards No    Sleep Concern No    Stress Concern No    Weight Concern No    Special Diet No    Back Care No    Exercise Yes    Bike Helmet No    Seat Belt No    Self-Exams No     History reviewed. No pertinent family history. Current Outpatient Medications   Medication Sig Dispense Refill    sildenafil citrate (Viagra) 100 mg tablet Take 1 Tablet by mouth as needed for Erectile Dysfunction. 8 Tablet 12    atorvastatin (LIPITOR) 40 mg tablet Take 1 tablet by mouth once daily 90 Tablet 0    metFORMIN (GLUCOPHAGE) 1,000 mg tablet TAKE HALF TABLET BY MOUTH TWICE DAILY WITH  A  MEAL 90 Tablet 3    glipiZIDE (GLUCOTROL) 5 mg tablet TAKE  One   TABLET BY MOUTH TWICE DAILY, twenty min  Before b-fast and dinner  STOP 10 mg dose 90 Tablet 3    SITagliptin (JANUVIA) 100 mg tablet Take 1 Tablet by mouth daily.  90 Tablet 3    pioglitazone (ACTOS) 30 mg tablet Take 1 Tablet by mouth daily. 90 Tablet 3    Lactobacillus acidophilus (PROBIOTIC PO) Take  by mouth.  sildenafil citrate (Viagra) 100 mg tablet Take 1 Tablet by mouth as needed for Erectile Dysfunction. (Patient not taking: Reported on 1/19/2022) 30 Tablet 12     No Known Allergies    Objective:  Visit Vitals  /80   Pulse 71   Temp 97.8 °F (36.6 °C) (Oral)   Resp 16   Ht 5' 11\" (1.803 m)   Wt 257 lb (116.6 kg)   SpO2 98%   BMI 35.84 kg/m²     Physical Exam:   General appearance - alert, well appearing, and in no distress  Mental status - alert, oriented to person, place, and time  EYE-DAVIAN, EOMI, corneas normal, no foreign bodies  ENT-ENT exam normal, no neck nodes or sinus tenderness  Nose - normal and patent, no erythema, discharge or polyps  Mouth - mucous membranes moist, pharynx normal without lesions  Neck - supple, no significant adenopathy   Chest - clear to auscultation, no wheezes, rales or rhonchi, symmetric air entry   Heart - normal rate, regular rhythm, normal S1, S2, no murmurs, rubs, clicks or gallops   Abdomen - soft, nontender, nondistended, no masses or organomegaly  Lymph- no adenopathy palpable  Ext-peripheral pulses normal, no pedal edema, no clubbing or cyanosis  Skin-Warm and dry.  no hyperpigmentation, vitiligo, or suspicious lesions  Neuro -alert, oriented, normal speech, no focal findings or movement disorder noted  Neck-normal C-spine, no tenderness, full ROM without pain  Feet-no nail deformities or callus formation with good pulses noted      Results for orders placed or performed in visit on 01/19/22   AMB POC LIPID PROFILE   Result Value Ref Range    Cholesterol (POC) 190     Triglycerides (POC) 45     HDL Cholesterol (POC) 68     NON HDL CHOLESTEROL 123     LDL Cholesterol (POC) NA MG/DL    TChol/HDL Ratio (POC) 2.8    AMB POC HEMOGLOBIN A1C   Result Value Ref Range    Hemoglobin A1c (POC) 6.8 %   AMB POC GLUCOSE BLOOD, BY GLUCOSE MONITORING DEVICE   Result Value Ref Range    Glucose  MG/DL       Assessment/Plan:    ICD-10-CM ICD-9-CM    1. Type II or unspecified type diabetes mellitus with neurological manifestations, uncontrolled(250.62) ([facility])  E11.49 250.62 AMB POC LIPID PROFILE      AMB POC HEMOGLOBIN A1C      AMB POC GLUCOSE BLOOD, BY GLUCOSE MONITORING DEVICE   2. Hypercholesteremia  E78.00 272.0 AMB POC LIPID PROFILE      AMB POC HEMOGLOBIN A1C      AMB POC GLUCOSE BLOOD, BY GLUCOSE MONITORING DEVICE   3. Essential hypertension  I10 401.9 AMB POC LIPID PROFILE      AMB POC HEMOGLOBIN A1C      AMB POC GLUCOSE BLOOD, BY GLUCOSE MONITORING DEVICE      CBC W/O DIFF      METABOLIC PANEL, COMPREHENSIVE      CBC W/O DIFF      METABOLIC PANEL, COMPREHENSIVE   4. Hematuria, unspecified type  R31.9 599.70    5. Severe obesity (BMI 35.0-35.9 with comorbidity) ([facility]  E66.01 278.01     Z68.35 V85.35      Orders Placed This Encounter    CBC W/O DIFF     Standing Status:   Future     Number of Occurrences:   1     Standing Expiration Date:   0/32/8489    METABOLIC PANEL, COMPREHENSIVE     Standing Status:   Future     Number of Occurrences:   1     Standing Expiration Date:   1/19/2023    AMB POC LIPID PROFILE    AMB POC HEMOGLOBIN A1C    AMB POC GLUCOSE BLOOD, BY GLUCOSE MONITORING DEVICE    sildenafil citrate (Viagra) 100 mg tablet     Sig: Take 1 Tablet by mouth as needed for Erectile Dysfunction. Dispense:  8 Tablet     Refill:  12     lose weight, increase physical activity, follow low fat diet, follow low salt diet, continue present plan, call if any problems,Take 81mg aspirin daily  Patient Instructions   [employer] Activation    Thank you for requesting access to [employer]. Please follow the instructions below to securely access and download your online medical record. [employer] allows you to send messages to your doctor, view your test results, renew your prescriptions, schedule appointments, and more. How Do I Sign Up? 1.  In your internet browser, go to [email or web address]  2. Click on the First Time User? Click Here link in the Sign In box. You will be redirect to the New Member Sign Up page. 3. Enter your [employer] Access Code exactly as it appears below. You will not need to use this code after youve completed the sign-up process. If you do not sign up before the expiration date, you must request a new code. [employer] Access Code: [email or web address]  Expires: 3/5/2022  8:03 AM (This is the date your [employer] access code will )    4. Enter the last four digits of your Social Security Number (xxxx) and Date of Birth (mm/dd/yyyy) as indicated and click Submit. You will be taken to the next sign-up page. 5. Create a [employer] ID. This will be your [employer] login ID and cannot be changed, so think of one that is secure and easy to remember. 6. Create a [employer] password. You can change your password at any time. 7. Enter your Password Reset Question and Answer. This can be used at a later time if you forget your password. 8. Enter your e-mail address. You will receive e-mail notification when new information is available in [address] 9. Click Sign Up. You can now view and download portions of your medical record. 10. Click the Download Summary menu link to download a portable copy of your medical information. Additional Information    If you have questions, please visit the Frequently Asked Questions section of the [employer] website at [email or web address] Remember, [employer] is NOT to be used for urgent needs. For medical emergencies, dial 911. Follow-up and Dispositions    · Return in about 3 months (around 2022), or if symptoms worsen or fail to improve. Follow-up and Disposition History           I have reviewed with the patient details of the assessment and plan and all questions were answered. Relevent patient education was performed. The most recent lab findings were reviewed with the patient. An After Visit Summary was printed and given to the patient.

## 2022-01-19 NOTE — PROGRESS NOTES
1. Have you been to the ER, urgent care clinic since your last visit? Hospitalized since your last visit?no    2. Have you seen or consulted any other health care providers outside of the [address] [place] [address] since your last visit? Include any pap smears or colon screening. No    Chief Complaint   Patient presents with    Diabetes    Cholesterol Problem     3 most recent PHQ Screens 1/19/2022   PHQ Not Done -   Little interest or pleasure in doing things Not at all   Feeling down, depressed, irritable, or hopeless Not at all   Total Score PHQ 2 0     Per Dr. Piedad Delgado.,  verbal order given for needed amb poc labs.

## 2022-01-20 LAB
ALBUMIN SERPL-MCNC: 4.7 G/DL (ref 3.7–4.7)
ALBUMIN/GLOB SERPL: 1.5 {RATIO} (ref 1.2–2.2)
ALP SERPL-CCNC: 65 IU/L (ref 44–121)
ALT SERPL-CCNC: 22 IU/L (ref 0–44)
AST SERPL-CCNC: 48 IU/L (ref 0–40)
BILIRUB SERPL-MCNC: 0.4 MG/DL (ref 0–1.2)
BUN SERPL-MCNC: 13 MG/DL (ref 8–27)
BUN/CREAT SERPL: 16 (ref 10–24)
CALCIUM SERPL-MCNC: 9.7 MG/DL (ref 8.6–10.2)
CHLORIDE SERPL-SCNC: 102 MMOL/L (ref 96–106)
CO2 SERPL-SCNC: 26 MMOL/L (ref 20–29)
CREAT SERPL-MCNC: 0.8 MG/DL (ref 0.76–1.27)
ERYTHROCYTE [DISTWIDTH] IN BLOOD BY AUTOMATED COUNT: 12.2 % (ref 11.6–15.4)
GLOBULIN SER CALC-MCNC: 3.2 G/DL (ref 1.5–4.5)
GLUCOSE SERPL-MCNC: 127 MG/DL (ref 65–99)
HCT VFR BLD AUTO: 37 % (ref 37.5–51)
HGB BLD-MCNC: 12.3 G/DL (ref 13–17.7)
MCH RBC QN AUTO: 30.7 PG (ref 26.6–33)
MCHC RBC AUTO-ENTMCNC: 33.2 G/DL (ref 31.5–35.7)
MCV RBC AUTO: 92 FL (ref 79–97)
PLATELET # BLD AUTO: 274 X10E3/UL (ref 150–450)
POTASSIUM SERPL-SCNC: 4.7 MMOL/L (ref 3.5–5.2)
PROT SERPL-MCNC: 7.9 G/DL (ref 6–8.5)
RBC # BLD AUTO: 4.01 X10E6/UL (ref 4.14–5.8)
SODIUM SERPL-SCNC: 140 MMOL/L (ref 134–144)
WBC # BLD AUTO: 6.3 X10E3/UL (ref 3.4–10.8)

## 2022-03-21 ENCOUNTER — OFFICE VISIT (OUTPATIENT)
Dept: INTERNAL MEDICINE CLINIC | Age: 72
End: 2022-03-21
Payer: MEDICARE

## 2022-03-21 VITALS
BODY MASS INDEX: 38.08 KG/M2 | TEMPERATURE: 97.7 F | WEIGHT: 272 LBS | RESPIRATION RATE: 18 BRPM | OXYGEN SATURATION: 97 % | DIASTOLIC BLOOD PRESSURE: 80 MMHG | HEART RATE: 65 BPM | HEIGHT: 71 IN | SYSTOLIC BLOOD PRESSURE: 138 MMHG

## 2022-03-21 DIAGNOSIS — E78.00 HYPERCHOLESTEREMIA: ICD-10-CM

## 2022-03-21 DIAGNOSIS — H25.013 CORTICAL AGE-RELATED CATARACT OF BOTH EYES: Primary | ICD-10-CM

## 2022-03-21 DIAGNOSIS — E11.49 TYPE II OR UNSPECIFIED TYPE DIABETES MELLITUS WITH NEUROLOGICAL MANIFESTATIONS, UNCONTROLLED(250.62) (HCC): ICD-10-CM

## 2022-03-21 LAB
GLUCOSE POC: 113 MG/DL
HBA1C MFR BLD HPLC: 6.4 %

## 2022-03-21 PROCEDURE — G8417 CALC BMI ABV UP PARAM F/U: HCPCS | Performed by: INTERNAL MEDICINE

## 2022-03-21 PROCEDURE — G8510 SCR DEP NEG, NO PLAN REQD: HCPCS | Performed by: INTERNAL MEDICINE

## 2022-03-21 PROCEDURE — 82962 GLUCOSE BLOOD TEST: CPT | Performed by: INTERNAL MEDICINE

## 2022-03-21 PROCEDURE — G8427 DOCREV CUR MEDS BY ELIG CLIN: HCPCS | Performed by: INTERNAL MEDICINE

## 2022-03-21 PROCEDURE — G8752 SYS BP LESS 140: HCPCS | Performed by: INTERNAL MEDICINE

## 2022-03-21 PROCEDURE — 3017F COLORECTAL CA SCREEN DOC REV: CPT | Performed by: INTERNAL MEDICINE

## 2022-03-21 PROCEDURE — G8754 DIAS BP LESS 90: HCPCS | Performed by: INTERNAL MEDICINE

## 2022-03-21 PROCEDURE — 3044F HG A1C LEVEL LT 7.0%: CPT | Performed by: INTERNAL MEDICINE

## 2022-03-21 PROCEDURE — 1101F PT FALLS ASSESS-DOCD LE1/YR: CPT | Performed by: INTERNAL MEDICINE

## 2022-03-21 PROCEDURE — 83036 HEMOGLOBIN GLYCOSYLATED A1C: CPT | Performed by: INTERNAL MEDICINE

## 2022-03-21 PROCEDURE — 99214 OFFICE O/P EST MOD 30 MIN: CPT | Performed by: INTERNAL MEDICINE

## 2022-03-21 PROCEDURE — 2022F DILAT RTA XM EVC RTNOPTHY: CPT | Performed by: INTERNAL MEDICINE

## 2022-03-21 PROCEDURE — G8536 NO DOC ELDER MAL SCRN: HCPCS | Performed by: INTERNAL MEDICINE

## 2022-03-21 NOTE — PROGRESS NOTES
Chief Complaint   Patient presents with    Pre-op Exam     3 most recent PHQ Screens 3/21/2022   PHQ Not Done -   Little interest or pleasure in doing things Not at all   Feeling down, depressed, irritable, or hopeless Not at all   Total Score PHQ 2 0     1. Have you been to the ER, urgent care clinic since your last visit? Hospitalized since your last visit?no    2. Have you seen or consulted any other health care providers outside of the [address] [place] [address] since your last visit? Include any pap smears or colon screening.  no

## 2022-03-21 NOTE — PROGRESS NOTES
Juan Ramon Martínez is a 70 y.o. male and presents with Pre-op Exam  .  Subjective: The patient presents for a pre-op cataract evaluation(bilaterally)    Diabetes Mellitus Review:  He has diabetes mellitus. Diabetic ROS - medication compliance: compliant all of the time, diabetic diet compliance: compliant all of the time, home glucose monitoring: is performed. Known diabetic complications: none  Cardiovascular risk factors: family history, dyslipidemia, diabetes mellitus, obesity, hypertension  Current diabetic medications include oral agents   Eye exam current (within one year): no  Weight trend: stable  Prior visit with dietician: no  Current diet: \"healthy\" diet  in general  Current exercise: walking  Current monitoring regimen: home blood tests - daily  Home blood sugar records: trend: stable  Any episodes of hypoglycemia? no  Is He on ACE inhibitor or angiotensin II receptor blocker? Yes     Dyslipidemia Review:  Patient presents for evaluation of lipids. Compliance with treatment thus far has been excellent. A repeat fasting lipid profile was not done. The patient does not use medications that may worsen dyslipidemias (corticosteroids, progestins, anabolic steroids, amiodarone, cyclosporine, olanzapine). The patient exercises some          Review of Systems  Constitutional: negative for fevers, chills, anorexia and weight loss  Eyes:   visual disturbance   ENT:   negative for tinnitus,sore throat,nasal congestion,ear pains. hoarseness  Respiratory:  negative for cough, hemoptysis, dyspnea,wheezing  CV:   negative for chest pain, palpitations, lower extremity edema  GI:   negative for nausea, vomiting, diarrhea, abdominal pain,melena  Endo:               negative for polyuria,polydipsia,polyphagia,heat intolerance  Genitourinary: negative for frequency, dysuria and hematuria  Integument:  negative for rash and pruritus  Hematologic:  negative for easy bruising and gum/nose bleeding  Musculoskel: negative for myalgias, arthralgias, back pain, muscle weakness, joint pain  Neurological:  negative for headaches, dizziness, vertigo, memory problems and gait   Behavl/Psych: negative for feelings of anxiety, depression, mood changes    Past Medical History:   Diagnosis Date    Diabetes [facility])      Past Surgical History:   Procedure Laterality Date    COLONOSCOPY N/A 5/11/2021    [facility] Rustam Dela [facility] HX ORTHOPAEDIC  2012    lumbar    HX PROSTATECTOMY  2012    HX UROLOGICAL  2012     Social History     Socioeconomic History    Marital status: SINGLE    Number of children: 2   Tobacco Use    Smoking status: Never Smoker    Smokeless tobacco: Never Used   Vaping Use    Vaping Use: Never used   Substance and Sexual Activity    Alcohol use: No    Drug use: No   Other Topics Concern     Service No    Blood Transfusions No    Caffeine Concern No    Occupational Exposure No    Hobby Hazards No    Sleep Concern No    Stress Concern No    Weight Concern No    Special Diet No    Back Care No    Exercise Yes    Bike Helmet No    Seat Belt No    Self-Exams No     History reviewed. No pertinent family history. Current Outpatient Medications   Medication Sig Dispense Refill    atorvastatin (LIPITOR) 40 mg tablet Take 1 tablet by mouth once daily 90 Tablet 0    metFORMIN (GLUCOPHAGE) 1,000 mg tablet TAKE HALF TABLET BY MOUTH TWICE DAILY WITH  A  MEAL 90 Tablet 3    glipiZIDE (GLUCOTROL) 5 mg tablet TAKE  One   TABLET BY MOUTH TWICE DAILY, twenty min  Before b-fast and dinner  STOP 10 mg dose 90 Tablet 3    SITagliptin (JANUVIA) 100 mg tablet Take 1 Tablet by mouth daily. 90 Tablet 3    pioglitazone (ACTOS) 30 mg tablet Take 1 Tablet by mouth daily. 90 Tablet 3    Lactobacillus acidophilus (PROBIOTIC PO) Take  by mouth.  sildenafil citrate (Viagra) 100 mg tablet Take 1 Tablet by mouth as needed for Erectile Dysfunction.  (Patient not taking: Reported on 3/21/2022) 8 Tablet 12    sildenafil citrate (Viagra) 100 mg tablet Take 1 Tablet by mouth as needed for Erectile Dysfunction. (Patient not taking: Reported on 1/19/2022) 30 Tablet 12     No Known Allergies    Objective:  Visit Vitals  /80 (BP 1 Location: Right arm, BP Patient Position: Sitting, BP Cuff Size: Adult)   Pulse 65   Temp 97.7 °F (36.5 °C) (Oral)   Resp 18   Ht 5' 11\" (1.803 m)   Wt 272 lb (123.4 kg)   SpO2 97%   BMI 37.94 kg/m²     Physical Exam:   General appearance - alert, well appearing, and in no distress  Mental status - alert, oriented to person, place, and time  EYE-DAVIAN, EOMI, corneas normal, no foreign bodies,cataract bilaterally  ENT-ENT exam normal, no neck nodes or sinus tenderness  Nose - normal and patent, no erythema, discharge or polyps  Mouth - mucous membranes moist, pharynx normal without lesions  Neck - supple, no significant adenopathy   Chest - clear to auscultation, no wheezes, rales or rhonchi, symmetric air entry   Heart - normal rate, regular rhythm, normal S1, S2, no murmurs, rubs, clicks or gallops   Abdomen - soft, nontender, nondistended, no masses or organomegaly  Lymph- no adenopathy palpable  Ext-peripheral pulses normal, no pedal edema, no clubbing or cyanosis  Skin-Warm and dry. no hyperpigmentation, vitiligo, or suspicious lesions  Neuro -alert, oriented, normal speech, no focal findings or movement disorder noted  Neck-normal C-spine, no tenderness, full ROM without pain      Results for orders placed or performed in visit on 03/21/22   AMB POC GLUCOSE BLOOD, BY GLUCOSE MONITORING DEVICE   Result Value Ref Range    Glucose  MG/DL   AMB POC HEMOGLOBIN A1C   Result Value Ref Range    Hemoglobin A1c (POC) 6.4 %       Assessment/Plan:    ICD-10-CM ICD-9-CM    1. Cortical age-related cataract of both eyes  H25.013 366.15    2.  Type II or unspecified type diabetes mellitus with neurological manifestations, uncontrolled(250.62) ([facility])  E11.49 250.62 AMB POC GLUCOSE BLOOD, BY GLUCOSE MONITORING DEVICE      AMB POC HEMOGLOBIN A1C   3. Hypercholesteremia  E78.00 272.0      Orders Placed This Encounter    AMB POC GLUCOSE BLOOD, BY GLUCOSE MONITORING DEVICE    AMB POC HEMOGLOBIN A1C     lose weight, follow low fat diet, follow low salt diet, continue present plan  MEDICALLY STABLE FOR CATARACT SURGERY  Patient Instructions   MyChart Activation    Thank you for requesting access to [employer]. Please follow the instructions below to securely access and download your online medical record. [employer] allows you to send messages to your doctor, view your test results, renew your prescriptions, schedule appointments, and more. How Do I Sign Up? 1. In your internet browser, go to [email or web address]  2. Click on the First Time User? Click Here link in the Sign In box. You will be redirect to the New Member Sign Up page. 3. Enter your [employer] Access Code exactly as it appears below. You will not need to use this code after youve completed the sign-up process. If you do not sign up before the expiration date, you must request a new code. [employer] Access Code: [email or web address]  Expires: 2022  3:47 PM (This is the date your [employer] access code will )    4. Enter the last four digits of your Social Security Number (xxxx) and Date of Birth (mm/dd/yyyy) as indicated and click Submit. You will be taken to the next sign-up page. 5. Create a [employer] ID. This will be your [employer] login ID and cannot be changed, so think of one that is secure and easy to remember. 6. Create a [employer] password. You can change your password at any time. 7. Enter your Password Reset Question and Answer. This can be used at a later time if you forget your password. 8. Enter your e-mail address. You will receive e-mail notification when new information is available in [address] 9. Click Sign Up. You can now view and download portions of your medical record.   10. Click the Download Summary menu link to download a portable copy of your medical information. Additional Information    If you have questions, please visit the Frequently Asked Questions section of the [employer] website at [email or web address] Remember, [employer] is NOT to be used for urgent needs. For medical emergencies, dial 911. Follow-up and Dispositions    · Return in about 3 months (around 6/21/2022), or if symptoms worsen or fail to improve.

## 2022-03-21 NOTE — PATIENT INSTRUCTIONS
[employer] Activation    Thank you for requesting access to [employer]. Please follow the instructions below to securely access and download your online medical record. [employer] allows you to send messages to your doctor, view your test results, renew your prescriptions, schedule appointments, and more. How Do I Sign Up? 1. In your internet browser, go to [email or web address]  2. Click on the First Time User? Click Here link in the Sign In box. You will be redirect to the New Member Sign Up page. 3. Enter your [employer] Access Code exactly as it appears below. You will not need to use this code after youve completed the sign-up process. If you do not sign up before the expiration date, you must request a new code. [employer] Access Code: [email or web address]  Expires: 2022  3:47 PM (This is the date your [employer] access code will )    4. Enter the last four digits of your Social Security Number (xxxx) and Date of Birth (mm/dd/yyyy) as indicated and click Submit. You will be taken to the next sign-up page. 5. Create a [employer] ID. This will be your [employer] login ID and cannot be changed, so think of one that is secure and easy to remember. 6. Create a [employer] password. You can change your password at any time. 7. Enter your Password Reset Question and Answer. This can be used at a later time if you forget your password. 8. Enter your e-mail address. You will receive e-mail notification when new information is available in [address] 9. Click Sign Up. You can now view and download portions of your medical record. 10. Click the Download Summary menu link to download a portable copy of your medical information. Additional Information    If you have questions, please visit the Frequently Asked Questions section of the [employer] website at [email or web address] Remember, [employer] is NOT to be used for urgent needs. For medical emergencies, dial 911.

## 2022-06-27 ENCOUNTER — OFFICE VISIT (OUTPATIENT)
Dept: INTERNAL MEDICINE CLINIC | Age: 72
End: 2022-06-27
Payer: MEDICARE

## 2022-06-27 VITALS
OXYGEN SATURATION: 98 % | TEMPERATURE: 98 F | SYSTOLIC BLOOD PRESSURE: 138 MMHG | HEART RATE: 76 BPM | WEIGHT: 264 LBS | DIASTOLIC BLOOD PRESSURE: 80 MMHG | BODY MASS INDEX: 36.96 KG/M2 | RESPIRATION RATE: 16 BRPM | HEIGHT: 71 IN

## 2022-06-27 DIAGNOSIS — E78.00 HYPERCHOLESTEREMIA: ICD-10-CM

## 2022-06-27 DIAGNOSIS — N52.1 ERECTILE DISORDER DUE TO MEDICAL CONDITION IN MALE: ICD-10-CM

## 2022-06-27 DIAGNOSIS — I10 ESSENTIAL HYPERTENSION: Primary | ICD-10-CM

## 2022-06-27 DIAGNOSIS — R35.0 FREQUENCY OF URINATION: ICD-10-CM

## 2022-06-27 PROCEDURE — G8536 NO DOC ELDER MAL SCRN: HCPCS | Performed by: INTERNAL MEDICINE

## 2022-06-27 PROCEDURE — G8752 SYS BP LESS 140: HCPCS | Performed by: INTERNAL MEDICINE

## 2022-06-27 PROCEDURE — 3017F COLORECTAL CA SCREEN DOC REV: CPT | Performed by: INTERNAL MEDICINE

## 2022-06-27 PROCEDURE — 99214 OFFICE O/P EST MOD 30 MIN: CPT | Performed by: INTERNAL MEDICINE

## 2022-06-27 PROCEDURE — G8427 DOCREV CUR MEDS BY ELIG CLIN: HCPCS | Performed by: INTERNAL MEDICINE

## 2022-06-27 PROCEDURE — G8510 SCR DEP NEG, NO PLAN REQD: HCPCS | Performed by: INTERNAL MEDICINE

## 2022-06-27 PROCEDURE — G8417 CALC BMI ABV UP PARAM F/U: HCPCS | Performed by: INTERNAL MEDICINE

## 2022-06-27 PROCEDURE — G8754 DIAS BP LESS 90: HCPCS | Performed by: INTERNAL MEDICINE

## 2022-06-27 PROCEDURE — 1101F PT FALLS ASSESS-DOCD LE1/YR: CPT | Performed by: INTERNAL MEDICINE

## 2022-06-27 NOTE — PATIENT INSTRUCTIONS
[employer] Activation    Thank you for requesting access to [employer]. Please follow the instructions below to securely access and download your online medical record. [employer] allows you to send messages to your doctor, view your test results, renew your prescriptions, schedule appointments, and more. How Do I Sign Up? 1. In your internet browser, go to [email or web address]  2. Click on the First Time User? Click Here link in the Sign In box. You will be redirect to the New Member Sign Up page. 3. Enter your [employer] Access Code exactly as it appears below. You will not need to use this code after youve completed the sign-up process. If you do not sign up before the expiration date, you must request a new code. [employer] Access Code: [email or web address]  Expires: 2022  7:56 AM (This is the date your [employer] access code will )    4. Enter the last four digits of your Social Security Number (xxxx) and Date of Birth (mm/dd/yyyy) as indicated and click Submit. You will be taken to the next sign-up page. 5. Create a [employer] ID. This will be your [employer] login ID and cannot be changed, so think of one that is secure and easy to remember. 6. Create a [employer] password. You can change your password at any time. 7. Enter your Password Reset Question and Answer. This can be used at a later time if you forget your password. 8. Enter your e-mail address. You will receive e-mail notification when new information is available in [address] 9. Click Sign Up. You can now view and download portions of your medical record. 10. Click the Download Summary menu link to download a portable copy of your medical information. Additional Information    If you have questions, please visit the Frequently Asked Questions section of the [employer] website at [email or web address][email or web address]/. Remember, [employer] is NOT to be used for urgent needs. For medical emergencies, dial 911.

## 2022-06-27 NOTE — PROGRESS NOTES
Rudy Herrera is a 70 y.o. male and presents with Diabetes and Cholesterol Problem    Subjective:  Dyslipidemia Review:  Patient presents for evaluation of lipids. Compliance with treatment thus far has been excellent. A repeat fasting lipid profile was not done. The patient does not use medications that may worsen dyslipidemias (corticosteroids, progestins, anabolic steroids, amiodarone, cyclosporine, olanzapine). The patient exercises some    Diabetes Mellitus Review:  He has diabetes mellitus. Diabetic ROS - medication compliance: compliant all of the time, diabetic diet compliance: compliant all of the time, home glucose monitoring: is performed. Known diabetic complications: none  Cardiovascular risk factors: family history, dyslipidemia, diabetes mellitus, obesity, hypertension  Current diabetic medications include oral agents/insulin   Eye exam current (within one year): no  Weight trend: stable  Prior visit with dietician: no  Current diet: \"healthy\" diet  in general  Current exercise: walking  Current monitoring regimen: home blood tests - daily  Home blood sugar records: trend: stable  Any episodes of hypoglycemia? no  Is He on ACE inhibitor or angiotensin II receptor blocker? Yes     Erectile dysfunction Review[de-identified]  Patient complains of difficulty in maintaining an adequate erection. He states that his present medication is not helping thus far. Review of Systems  Constitutional: negative for fevers, chills, anorexia and weight loss  Eyes:   negative for visual disturbance and irritation  ENT:   negative for tinnitus,sore throat,nasal congestion,ear pains. hoarseness  Respiratory:  negative for cough, hemoptysis, dyspnea,wheezing  CV:   negative for chest pain, palpitations, lower extremity edema  GI:   negative for nausea, vomiting, diarrhea, abdominal pain,melena  Endo:               negative for polyuria,polydipsia,polyphagia,heat intolerance  Genitourinary: negative for frequency, dysuria and hematuria  Integument:  negative for rash and pruritus  Hematologic:  negative for easy bruising and gum/nose bleeding  Musculoskel: negative for myalgias, arthralgias, back pain, muscle weakness, joint pain  Neurological:  negative for headaches, dizziness, vertigo, memory problems and gait   Behavl/Psych: negative for feelings of anxiety, depression, mood changes    Past Medical History:   Diagnosis Date    Diabetes [facility])      Past Surgical History:   Procedure Laterality Date    COLONOSCOPY N/A 5/11/2021    COLONOSCOPY performed by Henry Joaquin MD at [facility] HX ORTHOPAEDIC  2012    lumbar    HX PROSTATECTOMY  2012    HX UROLOGICAL  2012     Social History     Socioeconomic History    Marital status: SINGLE    Number of children: 2   Tobacco Use    Smoking status: Never Smoker    Smokeless tobacco: Never Used   Vaping Use    Vaping Use: Never used   Substance and Sexual Activity    Alcohol use: No    Drug use: No   Other Topics Concern     Service No    Blood Transfusions No    Caffeine Concern No    Occupational Exposure No    Hobby Hazards No    Sleep Concern No    Stress Concern No    Weight Concern No    Special Diet No    Back Care No    Exercise Yes    Bike Helmet No    Seat Belt No    Self-Exams No     History reviewed. No pertinent family history. Current Outpatient Medications   Medication Sig Dispense Refill    atorvastatin (LIPITOR) 40 mg tablet Take 1 tablet by mouth once daily 90 Tablet 0    metFORMIN (GLUCOPHAGE) 1,000 mg tablet TAKE HALF TABLET BY MOUTH TWICE DAILY WITH  A  MEAL 90 Tablet 3    glipiZIDE (GLUCOTROL) 5 mg tablet TAKE  One   TABLET BY MOUTH TWICE DAILY, twenty min  Before b-fast and dinner  STOP 10 mg dose 90 Tablet 3    SITagliptin (JANUVIA) 100 mg tablet Take 1 Tablet by mouth daily. 90 Tablet 3    pioglitazone (ACTOS) 30 mg tablet Take 1 Tablet by mouth daily.  90 Tablet 3    Lactobacillus acidophilus (PROBIOTIC PO) Take  by mouth.      sildenafil citrate (Viagra) 100 mg tablet Take 1 Tablet by mouth as needed for Erectile Dysfunction. (Patient not taking: Reported on 3/21/2022) 8 Tablet 12    sildenafil citrate (Viagra) 100 mg tablet Take 1 Tablet by mouth as needed for Erectile Dysfunction. (Patient not taking: Reported on 1/19/2022) 30 Tablet 12     No Known Allergies    Objective:  Visit Vitals  /80   Pulse 76   Temp 98 °F (36.7 °C) (Oral)   Resp 16   Ht 5' 11\" (1.803 m)   Wt 264 lb (119.7 kg)   SpO2 98%   BMI 36.82 kg/m²     Physical Exam:   General appearance - alert, well appearing, and in no distress  Mental status - alert, oriented to person, place, and time  EYE-DAVIAN, EOMI, corneas normal, no foreign bodies  ENT-ENT exam normal, no neck nodes or sinus tenderness  Nose - normal and patent, no erythema, discharge or polyps  Mouth - mucous membranes moist, pharynx normal without lesions  Neck - supple, no significant adenopathy   Chest - clear to auscultation, no wheezes, rales or rhonchi, symmetric air entry   Heart - normal rate, regular rhythm, normal S1, S2, no murmurs, rubs, clicks or gallops   Abdomen - soft, nontender, nondistended, no masses or organomegaly  Lymph- no adenopathy palpable  Ext-peripheral pulses normal, no pedal edema, no clubbing or cyanosis  Skin-Warm and dry. no hyperpigmentation, vitiligo, or suspicious lesions  Neuro -alert, oriented, normal speech, no focal findings or movement disorder noted  Neck-normal C-spine, no tenderness, full ROM without pain  Feet-no nail deformities or callus formation with good pulses noted      Results for orders placed or performed in visit on 03/21/22   AMB POC GLUCOSE BLOOD, BY GLUCOSE MONITORING DEVICE   Result Value Ref Range    Glucose  MG/DL   AMB POC HEMOGLOBIN A1C   Result Value Ref Range    Hemoglobin A1c (POC) 6.4 %       Assessment/Plan:    ICD-10-CM ICD-9-CM    1.  Essential hypertension  I10 401.9 CBC W/O DIFF      METABOLIC PANEL, COMPREHENSIVE      CBC W/O DIFF      METABOLIC PANEL, COMPREHENSIVE   2. Hypercholesteremia  E78.00 272.0 LIPID PANEL      LIPID PANEL   3. Erectile disorder due to medical condition in male  N52.1 607.84    4. Frequency of urination  R35.0 788.41 PSA, DIAGNOSTIC (PROSTATE SPECIFIC AG)      PSA, DIAGNOSTIC (PROSTATE SPECIFIC AG)     Orders Placed This Encounter    CBC W/O DIFF     Standing Status:   Future     Number of Occurrences:   1     Standing Expiration Date:   2023    LIPID PANEL     Standing Status:   Future     Number of Occurrences:   1     Standing Expiration Date:   2250    METABOLIC PANEL, COMPREHENSIVE     Standing Status:   Future     Number of Occurrences:   1     Standing Expiration Date:   2023    PROSTATE SPECIFIC AG     Standing Status:   Future     Number of Occurrences:   1     Standing Expiration Date:   2023     lose weight, increase physical activity, follow low fat diet, follow low salt diet, call if any problems,Take 81mg aspirin daily  Patient Instructions   [employer] Activation    Thank you for requesting access to [employer]. Please follow the instructions below to securely access and download your online medical record. [employer] allows you to send messages to your doctor, view your test results, renew your prescriptions, schedule appointments, and more. How Do I Sign Up? 1. In your internet browser, go to [email or web address]  2. Click on the First Time User? Click Here link in the Sign In box. You will be redirect to the New Member Sign Up page. 3. Enter your [employer] Access Code exactly as it appears below. You will not need to use this code after youve completed the sign-up process. If you do not sign up before the expiration date, you must request a new code. [employer] Access Code: [email or web address]  Expires: 2022  7:56 AM (This is the date your [employer] access code will )    4.  Enter the last four digits of your Social Security Number (xxxx) and Date of Birth (mm/dd/yyyy) as indicated and click Submit. You will be taken to the next sign-up page. 5. Create a Outboxt ID. This will be your [employer] login ID and cannot be changed, so think of one that is secure and easy to remember. 6. Create a [employer] password. You can change your password at any time. 7. Enter your Password Reset Question and Answer. This can be used at a later time if you forget your password. 8. Enter your e-mail address. You will receive e-mail notification when new information is available in [address] 9. Click Sign Up. You can now view and download portions of your medical record. 10. Click the Download Summary menu link to download a portable copy of your medical information. Additional Information    If you have questions, please visit the Frequently Asked Questions section of the [employer] website at [email or web address] Remember, [employer] is NOT to be used for urgent needs. For medical emergencies, dial 911. Follow-up and Dispositions    · Return in about 3 months (around 9/27/2022), or if symptoms worsen or fail to improve. I have reviewed with the patient details of the assessment and plan and all questions were answered. Relevent patient education was performed. The most recent lab findings were reviewed with the patient. An After Visit Summary was printed and given to the patient.

## 2022-06-27 NOTE — PROGRESS NOTES
1. Have you been to the ER, urgent care clinic since your last visit? Hospitalized since your last visit?no    2. Have you seen or consulted any other health care providers outside of the [address] [place] [address] since your last visit? Include any pap smears or colon screening.  No    Chief Complaint   Patient presents with    Diabetes    Cholesterol Problem     3 most recent PHQ Screens 6/27/2022   PHQ Not Done -   Little interest or pleasure in doing things Not at all   Feeling down, depressed, irritable, or hopeless Not at all   Total Score PHQ 2 0

## 2022-06-28 LAB
ALBUMIN SERPL-MCNC: 4.5 G/DL (ref 3.7–4.7)
ALBUMIN/GLOB SERPL: 1.6 {RATIO} (ref 1.2–2.2)
ALP SERPL-CCNC: 58 IU/L (ref 44–121)
ALT SERPL-CCNC: 20 IU/L (ref 0–44)
AST SERPL-CCNC: 50 IU/L (ref 0–40)
BILIRUB SERPL-MCNC: 0.4 MG/DL (ref 0–1.2)
BUN SERPL-MCNC: 15 MG/DL (ref 8–27)
BUN/CREAT SERPL: 14 (ref 10–24)
CALCIUM SERPL-MCNC: 9.4 MG/DL (ref 8.6–10.2)
CHLORIDE SERPL-SCNC: 103 MMOL/L (ref 96–106)
CHOLEST SERPL-MCNC: 213 MG/DL (ref 100–199)
CO2 SERPL-SCNC: 22 MMOL/L (ref 20–29)
CREAT SERPL-MCNC: 1.05 MG/DL (ref 0.76–1.27)
EGFR: 76 ML/MIN/1.73
ERYTHROCYTE [DISTWIDTH] IN BLOOD BY AUTOMATED COUNT: 12.7 % (ref 11.6–15.4)
GLOBULIN SER CALC-MCNC: 2.9 G/DL (ref 1.5–4.5)
GLUCOSE SERPL-MCNC: 146 MG/DL (ref 65–99)
HCT VFR BLD AUTO: 37.2 % (ref 37.5–51)
HDLC SERPL-MCNC: 73 MG/DL
HGB BLD-MCNC: 12.5 G/DL (ref 13–17.7)
IMP & REVIEW OF LAB RESULTS: NORMAL
LDLC SERPL CALC-MCNC: 128 MG/DL (ref 0–99)
MCH RBC QN AUTO: 32.2 PG (ref 26.6–33)
MCHC RBC AUTO-ENTMCNC: 33.6 G/DL (ref 31.5–35.7)
MCV RBC AUTO: 96 FL (ref 79–97)
PLATELET # BLD AUTO: 241 X10E3/UL (ref 150–450)
POTASSIUM SERPL-SCNC: 4.7 MMOL/L (ref 3.5–5.2)
PROT SERPL-MCNC: 7.4 G/DL (ref 6–8.5)
PSA SERPL-MCNC: 0.4 NG/ML (ref 0–4)
RBC # BLD AUTO: 3.88 X10E6/UL (ref 4.14–5.8)
SODIUM SERPL-SCNC: 139 MMOL/L (ref 134–144)
TRIGL SERPL-MCNC: 69 MG/DL (ref 0–149)
VLDLC SERPL CALC-MCNC: 12 MG/DL (ref 5–40)
WBC # BLD AUTO: 5.4 X10E3/UL (ref 3.4–10.8)

## 2022-07-19 RX ORDER — ATORVASTATIN CALCIUM 40 MG/1
TABLET, FILM COATED ORAL
Qty: 90 TABLET | Refills: 0 | Status: SHIPPED | OUTPATIENT
Start: 2022-07-19 | End: 2022-10-03 | Stop reason: SDUPTHER

## 2022-10-03 ENCOUNTER — OFFICE VISIT (OUTPATIENT)
Dept: ENDOCRINOLOGY | Age: 72
End: 2022-10-03
Payer: MEDICARE

## 2022-10-03 VITALS
TEMPERATURE: 97.3 F | DIASTOLIC BLOOD PRESSURE: 70 MMHG | HEART RATE: 78 BPM | BODY MASS INDEX: 38.08 KG/M2 | HEIGHT: 71 IN | RESPIRATION RATE: 18 BRPM | WEIGHT: 272 LBS | OXYGEN SATURATION: 98 % | SYSTOLIC BLOOD PRESSURE: 129 MMHG

## 2022-10-03 DIAGNOSIS — I10 ESSENTIAL HYPERTENSION: ICD-10-CM

## 2022-10-03 DIAGNOSIS — E78.2 MIXED HYPERLIPIDEMIA: ICD-10-CM

## 2022-10-03 DIAGNOSIS — E11.65 UNCONTROLLED TYPE 2 DIABETES MELLITUS WITH HYPERGLYCEMIA (HCC): Primary | ICD-10-CM

## 2022-10-03 PROCEDURE — 2022F DILAT RTA XM EVC RTNOPTHY: CPT | Performed by: INTERNAL MEDICINE

## 2022-10-03 PROCEDURE — G8510 SCR DEP NEG, NO PLAN REQD: HCPCS | Performed by: INTERNAL MEDICINE

## 2022-10-03 PROCEDURE — G8427 DOCREV CUR MEDS BY ELIG CLIN: HCPCS | Performed by: INTERNAL MEDICINE

## 2022-10-03 PROCEDURE — G8536 NO DOC ELDER MAL SCRN: HCPCS | Performed by: INTERNAL MEDICINE

## 2022-10-03 PROCEDURE — 3044F HG A1C LEVEL LT 7.0%: CPT | Performed by: INTERNAL MEDICINE

## 2022-10-03 PROCEDURE — 1101F PT FALLS ASSESS-DOCD LE1/YR: CPT | Performed by: INTERNAL MEDICINE

## 2022-10-03 PROCEDURE — G8754 DIAS BP LESS 90: HCPCS | Performed by: INTERNAL MEDICINE

## 2022-10-03 PROCEDURE — 3017F COLORECTAL CA SCREEN DOC REV: CPT | Performed by: INTERNAL MEDICINE

## 2022-10-03 PROCEDURE — G8417 CALC BMI ABV UP PARAM F/U: HCPCS | Performed by: INTERNAL MEDICINE

## 2022-10-03 PROCEDURE — 1124F ACP DISCUSS-NO DSCNMKR DOCD: CPT | Performed by: INTERNAL MEDICINE

## 2022-10-03 PROCEDURE — G8752 SYS BP LESS 140: HCPCS | Performed by: INTERNAL MEDICINE

## 2022-10-03 PROCEDURE — 99214 OFFICE O/P EST MOD 30 MIN: CPT | Performed by: INTERNAL MEDICINE

## 2022-10-03 RX ORDER — PIOGLITAZONEHYDROCHLORIDE 30 MG/1
30 TABLET ORAL DAILY
Qty: 90 TABLET | Refills: 3 | Status: SHIPPED | OUTPATIENT
Start: 2022-10-03

## 2022-10-03 RX ORDER — GLIPIZIDE 5 MG/1
TABLET ORAL
Qty: 180 TABLET | Refills: 3 | Status: SHIPPED | OUTPATIENT
Start: 2022-10-03

## 2022-10-03 RX ORDER — METFORMIN HYDROCHLORIDE 1000 MG/1
TABLET ORAL
Qty: 90 TABLET | Refills: 3 | Status: SHIPPED | OUTPATIENT
Start: 2022-10-03

## 2022-10-03 RX ORDER — ATORVASTATIN CALCIUM 40 MG/1
40 TABLET, FILM COATED ORAL DAILY
Qty: 90 TABLET | Refills: 3 | Status: SHIPPED | OUTPATIENT
Start: 2022-10-03

## 2022-10-03 NOTE — LETTER
10/3/2022    Patient: Wilver Sargent   YOB: 1950   Date of Visit: 10/3/2022     Jolie Wade MD  [address]  Via In Basket    Dear Jolie Wade MD,      Thank you for referring Mr. Wilver Sargent to [address] [place] [address] for evaluation. My notes for this consultation are attached. If you have questions, please do not hesitate to call me. I look forward to following your patient along with you.       Sincerely,    Kirti Valentino MD

## 2022-10-03 NOTE — PATIENT INSTRUCTIONS
SPECIFIC INSTRUCTIONS BELOW       DRINK water   Do not skip meals  Do not eat in between meals    Reduce carbs- pasta, rice, potatoes, bread   Do not drink juices or sodas, even they are calorie zero or diet drinks  Do not eat peanut butter     Do not eat sugar free cookies and cakes   Do not eat peaches, oranges, pineapples, raisins, grapes , canteloupe , honey dew and fruit medleys      -----------------------------------------------------------------------------------------------------------------      Stay on  Metformin 1000 mg  HALF  pill   Twice   a day with meals     Stay on  glipizide 5 mg , twenty minutes before b-fast and dinner - stop 10 mg dose     Stay on januvia  100 mg a day       Stay on Pioglitazone 30 mg  A day           -------------PAY ATTENTION TO THESE GENERAL INSTRUCTIONS -----------------      - The medications prescribed at this visit will not be available at pharmacy until 6 pm       - YOUR MED LIST IS NOT UP TO DATE AS SOME CHANGES ARE BEING MADE AFTER THE VISIT - FOLLOW SPECIFIC INSTRUCTIONS  ABOVE     -ANY tests other than blood work, which you opt to do  outside the  [facility] facilities, you are responsible for prior authorizations if  required    - [address] UP TO DATE ON YOUR AVS- PLEASE IGNORE     Results     *Normal results will not be notified by a phone call starting January 1 2021   *If you have an upcoming visit, the results will be discussed at the visit   *Please sign up for MY CHART if you want access to your lab and test results  *Abnormal results which require immediate attention will be notified by phone call   *Abnormal results which do not require immediate assistance will be notified in 1-2 weeks       Refills    -    have your pharmacy send us a refill request . Refills are done max for one year and a visit is a must before refills are extended    Follow up appointments -  highly encourage you to make it when you are checking out. We can accommodate you into the schedule based on your clinical situation, but not for extending refills beyond a year. Labs are important to give refills and is important to get labs before the visit     Phone calls  -  Allow  24 hrs.  for non-urgent calls to be returned  Prior authorization - It may take 2-4 weeks to process  Forms  -  FMLA, DMV etc., will take up to 2 weeks to process  Cancellations - please notify the office 2 days in advance   Samples  - will only be dispensed at visits       If not showing for the appointments and cancelling appointments within 24 hours are kept track of and three  of such situations in  two consecutive years will likely be considered for termination from the practice    -------------------------------------------------------------------------------------------------------------------

## 2022-10-03 NOTE — PROGRESS NOTES
HISTORY OF PRESENT ILLNESS  Edwin Ramírez is a 70 y.o. male. Patient here for    VISIT  AFTER   last  visit of Type 2 diabetes mellitus  From November 2021     Gained 13  lbs in 10 months   Pleasant man -         Nov 2021     He had hematuria, severe started in May 2021   He had episodic issues and that turned out to be  Persistent 3 weeks ago   He had cystoscopy - no bladder mass   He is under stress, he is not eating right   No meter or not checking      OLD HISTORY  :    Referred : by self/pcp  H/o diabetes for few years   Current A1C is 8 % and symptoms/problems include none   Current diabetic medications include metformin and glipizide . Review of Systems   None       Physical Exam   Constitutional: She is oriented to person, place, and time. She appears well-developed and well-nourished. Psychiatric: She has a normal mood and affect. Lab Results   Component Value Date/Time    Hemoglobin A1c 6.3 (H) 09/26/2022 09:18 AM    Hemoglobin A1c 8.6 (H) 11/05/2021 12:00 AM    Hemoglobin A1c 7.5 (H) 05/05/2021 12:00 AM    Hemoglobin A1c, External 6.8 01/04/2018 12:00 AM    Glucose 137 (H) 09/26/2022 09:18 AM    Glucose (POC) 114 05/11/2021 09:59 AM    Glucose  03/21/2022 08:24 AM    Microalb/Creat ratio (ug/mg creat.) 12 09/26/2022 09:18 AM    LDL, calculated 87 09/26/2022 09:18 AM    Creatinine 0.99 09/26/2022 09:18 AM      Lab Results   Component Value Date/Time    Cholesterol, total 155 09/26/2022 09:18 AM    Cholesterol (POC) 190 01/19/2022 08:30 AM    HDL Cholesterol 58 09/26/2022 09:18 AM    LDL, calculated 87 09/26/2022 09:18 AM    LDL Cholesterol (POC) NA 01/19/2022 08:30 AM    LDL-C, External 80 01/04/2018 12:00 AM    Triglyceride 44 09/26/2022 09:18 AM    Triglycerides (POC) 45 01/19/2022 08:30 AM     Lab Results   Component Value Date/Time    ALT (SGPT) 18 09/26/2022 09:18 AM    Alk.  phosphatase 58 09/26/2022 09:18 AM    Bilirubin, total 0.4 09/26/2022 09:18 AM    Albumin 4.5 09/26/2022 09:18 AM    Protein, total 7.5 09/26/2022 09:18 AM    PLATELET 043 38/12/4784 08:37 AM     Lab Results   Component Value Date/Time    GFR est non-AA 90 01/19/2022 08:48 AM    GFR est  01/19/2022 08:48 AM    Creatinine 0.99 09/26/2022 09:18 AM    BUN 12 09/26/2022 09:18 AM    Sodium 144 09/26/2022 09:18 AM    Potassium 4.2 09/26/2022 09:18 AM    Chloride 107 (H) 09/26/2022 09:18 AM    CO2 22 09/26/2022 09:18 AM             ASSESSMENT and PLAN    1. Type 2 DM, uncontrolled :  a1c is    6.3 %    from   sept 2022   compared to  8.6  %     From Nov 2021     Compared to    7.5 %     From   May 2021   Compared to     6.9 %    FROM DEC 2020    COMPARED TO  8.3 %    From today  August 24 th 2020     Compared to    8 %  From nov 2019 Sept 2022     Improved control with weight   gain ( he denies low sugars )   He takes glipizide at lunch  and dinner  (   he separates by 6- 8 hours )   No meter  in hand    Stay on   Glipizide 5 mg bid   Stay on actos  30 mg  ( 45 mg stop) - alerted there was an association with bladder cancer in smokers. .         Nov 2021  -     Loss of diabetes control - stressed out and eating  Sweets   metformin is stopped  By him  ( half dose )  Stay on   Glipizide 5 mg bid   Stay on actos  30 mg  ( 45 mg stop) - alerted there was an association with bladder cancer in smokers. .         May 2021   He is not surprised because he  Had decreased the number of pills  To once a day   Educated him about time of action  Profile of meds   Glipizide 5 mg bid and metformin and  januvia - stay on             2. Hypoglycemia :  Educated on treating the hypoglycemia. Discussed Glipizide  use and prescribed    3. HTN : continue current care. 4.  Dyslipidemia : continue lipitor .          Reviewed results with patient and discussed the labs being ordered today/bnv  Patient voiced understanding of plan of care

## 2022-10-03 NOTE — PROGRESS NOTES
1. Have you been to the ER, urgent care clinic since your last visit? No  Hospitalized since your last visit? No    2. Have you seen or consulted any other health care providers outside of the [address] [place] [address] since your last visit? Include any pap smears or colon screening.  No    Wt Readings from Last 3 Encounters:   10/03/22 272 lb (123.4 kg)   06/27/22 264 lb (119.7 kg)   03/21/22 272 lb (123.4 kg)     Temp Readings from Last 3 Encounters:   10/03/22 97.3 °F (36.3 °C) (Temporal)   06/27/22 98 °F (36.7 °C) (Oral)   03/21/22 97.7 °F (36.5 °C) (Oral)     BP Readings from Last 3 Encounters:   10/03/22 129/70   06/27/22 138/80   03/21/22 138/80     Pulse Readings from Last 3 Encounters:   10/03/22 78   06/27/22 76   03/21/22 65     Lab Results   Component Value Date/Time    Hemoglobin A1c 6.3 (H) 09/26/2022 09:18 AM    Hemoglobin A1c (POC) 6.4 03/21/2022 08:24 AM    Hemoglobin A1c, External 6.8 01/04/2018 12:00 AM

## 2022-10-10 ENCOUNTER — OFFICE VISIT (OUTPATIENT)
Dept: INTERNAL MEDICINE CLINIC | Age: 72
End: 2022-10-10
Payer: MEDICARE

## 2022-10-10 VITALS
BODY MASS INDEX: 37.38 KG/M2 | SYSTOLIC BLOOD PRESSURE: 130 MMHG | HEIGHT: 71 IN | HEART RATE: 64 BPM | DIASTOLIC BLOOD PRESSURE: 70 MMHG | WEIGHT: 267 LBS | TEMPERATURE: 98.3 F | OXYGEN SATURATION: 97 %

## 2022-10-10 DIAGNOSIS — I10 ESSENTIAL HYPERTENSION: Primary | ICD-10-CM

## 2022-10-10 DIAGNOSIS — E11.49 TYPE II OR UNSPECIFIED TYPE DIABETES MELLITUS WITH NEUROLOGICAL MANIFESTATIONS, UNCONTROLLED(250.62) (HCC): ICD-10-CM

## 2022-10-10 DIAGNOSIS — E78.00 HYPERCHOLESTEREMIA: ICD-10-CM

## 2022-10-10 LAB
CHOLEST SERPL-MCNC: 175 MG/DL
GLUCOSE POC: 125 MG/DL
HBA1C MFR BLD HPLC: 6.3 %
HDLC SERPL-MCNC: 68 MG/DL
LDL CHOLESTEROL POC: NORMAL MG/DL
NON-HDL GOAL (POC): 107
TCHOL/HDL RATIO (POC): 2.6
TRIGL SERPL-MCNC: <45 MG/DL

## 2022-10-10 PROCEDURE — G8417 CALC BMI ABV UP PARAM F/U: HCPCS | Performed by: INTERNAL MEDICINE

## 2022-10-10 PROCEDURE — G8754 DIAS BP LESS 90: HCPCS | Performed by: INTERNAL MEDICINE

## 2022-10-10 PROCEDURE — 3044F HG A1C LEVEL LT 7.0%: CPT | Performed by: INTERNAL MEDICINE

## 2022-10-10 PROCEDURE — 3017F COLORECTAL CA SCREEN DOC REV: CPT | Performed by: INTERNAL MEDICINE

## 2022-10-10 PROCEDURE — 2022F DILAT RTA XM EVC RTNOPTHY: CPT | Performed by: INTERNAL MEDICINE

## 2022-10-10 PROCEDURE — G8428 CUR MEDS NOT DOCUMENT: HCPCS | Performed by: INTERNAL MEDICINE

## 2022-10-10 PROCEDURE — 82962 GLUCOSE BLOOD TEST: CPT | Performed by: INTERNAL MEDICINE

## 2022-10-10 PROCEDURE — 83036 HEMOGLOBIN GLYCOSYLATED A1C: CPT | Performed by: INTERNAL MEDICINE

## 2022-10-10 PROCEDURE — 99214 OFFICE O/P EST MOD 30 MIN: CPT | Performed by: INTERNAL MEDICINE

## 2022-10-10 PROCEDURE — G8752 SYS BP LESS 140: HCPCS | Performed by: INTERNAL MEDICINE

## 2022-10-10 PROCEDURE — 1101F PT FALLS ASSESS-DOCD LE1/YR: CPT | Performed by: INTERNAL MEDICINE

## 2022-10-10 PROCEDURE — G8536 NO DOC ELDER MAL SCRN: HCPCS | Performed by: INTERNAL MEDICINE

## 2022-10-10 PROCEDURE — 80061 LIPID PANEL: CPT | Performed by: INTERNAL MEDICINE

## 2022-10-10 PROCEDURE — G8510 SCR DEP NEG, NO PLAN REQD: HCPCS | Performed by: INTERNAL MEDICINE

## 2022-10-10 NOTE — PROGRESS NOTES
Chief Complaint   Patient presents with    Diabetes    Hypertension    Cholesterol Problem     3 most recent PHQ Screens 10/10/2022   PHQ Not Done -   Little interest or pleasure in doing things Not at all   Feeling down, depressed, irritable, or hopeless Not at all   Total Score PHQ 2 0     1. Have you been to the ER, urgent care clinic since your last visit? Hospitalized since your last visit?no    2. Have you seen or consulted any other health care providers outside of the [address] [place] [address] since your last visit? Include any pap smears or colon screening.  no

## 2022-10-10 NOTE — PROGRESS NOTES
Merissa Zheng is a 70 y.o. male and presents with Diabetes, Hypertension, and Cholesterol Problem    Subjective:  Dyslipidemia Review:  Patient presents for evaluation of lipids. Compliance with treatment thus far has been excellent. A repeat fasting lipid profile was not done. The patient does not use medications that may worsen dyslipidemias (corticosteroids, progestins, anabolic steroids, amiodarone, cyclosporine, olanzapine). The patient exercises some    Diabetes Mellitus Review:  He has diabetes mellitus. Diabetic ROS - medication compliance: compliant all of the time, diabetic diet compliance: compliant all of the time, home glucose monitoring: is performed. Known diabetic complications: none  Cardiovascular risk factors: family history, dyslipidemia, diabetes mellitus, obesity, hypertension  Current diabetic medications include oral agents/insulin   Eye exam current (within one year): no  Weight trend: stable  Prior visit with dietician: no  Current diet: \"healthy\" diet  in general  Current exercise: walking  Current monitoring regimen: home blood tests - daily  Home blood sugar records: trend: stable  Any episodes of hypoglycemia? no  Is He on ACE inhibitor or angiotensin II receptor blocker? Yes     Erectile dysfunction Review[de-identified]  Patient complains of difficulty in maintaining an adequate erection. He states that his present medication is not helping thus far. He states his cataracts were evaluated. Review of Systems  Constitutional: negative for fevers, chills, anorexia and weight loss  Eyes:   visual disturbance   ENT:   negative for tinnitus,sore throat,nasal congestion,ear pains. hoarseness  Respiratory:  negative for cough, hemoptysis, dyspnea,wheezing  CV:   negative for chest pain, palpitations, lower extremity edema  GI:   negative for nausea, vomiting, diarrhea, abdominal pain,melena  Endo:               negative for polyuria,polydipsia,polyphagia,heat intolerance  Genitourinary: negative for frequency, dysuria and hematuria  Integument:  negative for rash and pruritus  Hematologic:  negative for easy bruising and gum/nose bleeding  Musculoskel: negative for myalgias, arthralgias, back pain, muscle weakness, joint pain  Neurological:  negative for headaches, dizziness, vertigo, memory problems and gait   Behavl/Psych: negative for feelings of anxiety, depression, mood changes    Past Medical History:   Diagnosis Date    Diabetes [facility])      Past Surgical History:   Procedure Laterality Date    COLONOSCOPY N/A 5/11/2021    COLONOSCOPY performed by Nilson Schwab MD at [facility] 29 2012    lumbar    HX PROSTATECTOMY  2012    HX UROLOGICAL  2012     Social History     Socioeconomic History    Marital status: SINGLE    Number of children: 2   Tobacco Use    Smoking status: Never    Smokeless tobacco: Never   Vaping Use    Vaping Use: Never used   Substance and Sexual Activity    Alcohol use: No    Drug use: No   Other Topics Concern     Service No    Blood Transfusions No    Caffeine Concern No    Occupational Exposure No    Hobby Hazards No    Sleep Concern No    Stress Concern No    Weight Concern No    Special Diet No    Back Care No    Exercise Yes    Bike Helmet No    Seat Belt No    Self-Exams No     History reviewed. No pertinent family history. Current Outpatient Medications   Medication Sig Dispense Refill    atorvastatin (LIPITOR) 40 mg tablet Take 1 Tablet by mouth daily. 90 Tablet 3    metFORMIN (GLUCOPHAGE) 1,000 mg tablet TAKE HALF TABLET BY MOUTH TWICE DAILY WITH  A  MEAL 90 Tablet 3    pioglitazone (ACTOS) 30 mg tablet Take 1 Tablet by mouth daily. 90 Tablet 3    glipiZIDE (GLUCOTROL) 5 mg tablet TAKE  One   TABLET BY MOUTH TWICE DAILY, twenty min  Before b-fast and dinner  STOP 10 mg dose 180 Tablet 3    SITagliptin (JANUVIA) 100 mg tablet Take 1 Tablet by mouth daily.  90 Tablet 3    Lactobacillus acidophilus (PROBIOTIC PO) Take  by mouth. No Known Allergies    Objective:  Visit Vitals  /70 (BP 1 Location: Right arm, BP Patient Position: Sitting, BP Cuff Size: Adult)   Pulse 64   Temp 98.3 °F (36.8 °C) (Oral)   Ht 5' 11\" (1.803 m)   Wt 267 lb (121.1 kg)   SpO2 97%   BMI 37.24 kg/m²     Physical Exam:   General appearance - alert, well appearing, and in no distress  Mental status - alert, oriented to person, place, and time  EYE-DAVIAN, EOMI, corneas normal, no foreign bodies  ENT-ENT exam normal, no neck nodes or sinus tenderness  Nose - normal and patent, no erythema, discharge or polyps  Mouth - mucous membranes moist, pharynx normal without lesions  Neck - supple, no significant adenopathy   Chest - clear to auscultation, no wheezes, rales or rhonchi, symmetric air entry   Heart - normal rate, regular rhythm, normal S1, S2, no murmurs, rubs, clicks or gallops   Abdomen - soft, nontender, nondistended, no masses or organomegaly  Lymph- no adenopathy palpable  Ext-peripheral pulses normal, no pedal edema, no clubbing or cyanosis  Skin-Warm and dry.  no hyperpigmentation, vitiligo, or suspicious lesions  Neuro -alert, oriented, normal speech, no focal findings or movement disorder noted  Neck-normal C-spine, no tenderness, full ROM without pain  Feet-no nail deformities or callus formation with good pulses noted      Results for orders placed or performed in visit on 09/26/22   HEMOGLOBIN A1C WITH EAG   Result Value Ref Range    Hemoglobin A1c 6.3 (H) 4.8 - 5.6 %    Estimated average glucose 134 mg/dL   LIPID PANEL   Result Value Ref Range    Cholesterol, total 155 100 - 199 mg/dL    Triglyceride 44 0 - 149 mg/dL    HDL Cholesterol 58 >39 mg/dL    VLDL, calculated 10 5 - 40 mg/dL    LDL, calculated 87 0 - 99 mg/dL   METABOLIC PANEL, COMPREHENSIVE   Result Value Ref Range    Glucose 137 (H) 70 - 99 mg/dL    BUN 12 8 - 27 mg/dL    Creatinine 0.99 0.76 - 1.27 mg/dL    eGFR 81 >59 mL/min/1.73    BUN/Creatinine ratio 12 10 - 24    Sodium 144 134 - 144 mmol/L    Potassium 4.2 3.5 - 5.2 mmol/L    Chloride 107 (H) 96 - 106 mmol/L    CO2 22 20 - 29 mmol/L    Calcium 9.1 8.6 - 10.2 mg/dL    Protein, total 7.5 6.0 - 8.5 g/dL    Albumin 4.5 3.7 - 4.7 g/dL    GLOBULIN, TOTAL 3.0 1.5 - 4.5 g/dL    A-G Ratio 1.5 1.2 - 2.2    Bilirubin, total 0.4 0.0 - 1.2 mg/dL    Alk. phosphatase 58 44 - 121 IU/L    AST (SGOT) 47 (H) 0 - 40 IU/L    ALT (SGPT) 18 0 - 44 IU/L   MICROALBUMIN, UR, RAND W/ MICROALB/CREAT RATIO   Result Value Ref Range    Creatinine, urine 233.1 Not Estab. mg/dL    Microalbumin, urine 27.6 Not Estab. ug/mL    Microalb/Creat ratio (ug/mg creat.) 12 0 - 29 mg/g creat   CVD REPORT   Result Value Ref Range    INTERPRETATION Note        Assessment/Plan:    ICD-10-CM ICD-9-CM    1. Essential hypertension  I10 401.9       2. Hypercholesteremia  E78.00 272.0 AMB POC LIPID PROFILE      3. 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 Please follow the instructions below to securely access and download your online medical record. [employer] To Get Appro[employer] For allows you to send messages to your doctor, view your test results, renew your prescriptions, schedule appointments, and more. How Do I Sign Up? In your internet browser, go to [email or web address]  Click on the First Time User? Click Here link in the Sign In box. You will be redirect to the New Member Sign Up page. Enter your [employer] To Get Approved For Access Code exactly as it appears below.  You will not need to use this code after youve completed the sign-up process. If you do not sign up before the expiration date, you must request a new code. [employer] Access Code: [email or web address]  Expires: 11/10/2022  8:56 AM (This is the date your [employer] access code will )    Enter the last four digits of your Social Security Number (xxxx) and Date of Birth (mm/dd/yyyy) as indicated and click Submit. You will be taken to the next sign-up page. Create a St[employer] ID. This will be your [employer] login ID and cannot be changed, so think of one that is secure and easy to remember. Create a [employer] password. You can change your password at any time. Enter your Password Reset Question and Answer. This can be used at a later time if you forget your password. Enter your e-mail address. You will receive e-mail notification when new information is available in [address] Click Sign Up. You can now view and download portions of your medical record. Click the [facility] link to download a portable copy of your medical information. Additional Information    If you have questions, please visit the Frequently Asked Questions section of the [employer] website at [email or web address] Remember, [employer] is NOT to be used for urgent needs. For medical emergencies, dial 911. Follow-up and Dispositions    Return in about 3 months (around 1/10/2023), or if symptoms worsen or fail to improve. I have reviewed with the patient details of the assessment and plan and all questions were answered. Relevent patient education was performed. The most recent lab findings were reviewed with the patient. An After Visit Summary was printed and given to the patient.

## 2023-01-10 ENCOUNTER — VIRTUAL VISIT (OUTPATIENT)
Dept: INTERNAL MEDICINE CLINIC | Age: 73
End: 2023-01-10
Payer: MEDICARE

## 2023-01-10 DIAGNOSIS — E11.49 TYPE II OR UNSPECIFIED TYPE DIABETES MELLITUS WITH NEUROLOGICAL MANIFESTATIONS, UNCONTROLLED(250.62) (HCC): ICD-10-CM

## 2023-01-10 DIAGNOSIS — E66.01 SEVERE OBESITY (BMI 35.0-39.9) WITH COMORBIDITY (HCC): ICD-10-CM

## 2023-01-10 DIAGNOSIS — E78.00 HYPERCHOLESTEREMIA: ICD-10-CM

## 2023-01-10 DIAGNOSIS — H25.011 CORTICAL AGE-RELATED CATARACT OF RIGHT EYE: Primary | ICD-10-CM

## 2023-01-10 LAB
GLUCOSE POC: 133 MG/DL
HBA1C MFR BLD HPLC: 6.8 %

## 2023-01-10 PROCEDURE — 99213 OFFICE O/P EST LOW 20 MIN: CPT | Performed by: INTERNAL MEDICINE

## 2023-01-10 PROCEDURE — 82962 GLUCOSE BLOOD TEST: CPT | Performed by: INTERNAL MEDICINE

## 2023-01-10 PROCEDURE — 3044F HG A1C LEVEL LT 7.0%: CPT | Performed by: INTERNAL MEDICINE

## 2023-01-10 PROCEDURE — G8427 DOCREV CUR MEDS BY ELIG CLIN: HCPCS | Performed by: INTERNAL MEDICINE

## 2023-01-10 PROCEDURE — G8510 SCR DEP NEG, NO PLAN REQD: HCPCS | Performed by: INTERNAL MEDICINE

## 2023-01-10 PROCEDURE — G8417 CALC BMI ABV UP PARAM F/U: HCPCS | Performed by: INTERNAL MEDICINE

## 2023-01-10 PROCEDURE — 83036 HEMOGLOBIN GLYCOSYLATED A1C: CPT | Performed by: INTERNAL MEDICINE

## 2023-01-10 PROCEDURE — G8536 NO DOC ELDER MAL SCRN: HCPCS | Performed by: INTERNAL MEDICINE

## 2023-01-10 PROCEDURE — 3017F COLORECTAL CA SCREEN DOC REV: CPT | Performed by: INTERNAL MEDICINE

## 2023-01-10 PROCEDURE — 1101F PT FALLS ASSESS-DOCD LE1/YR: CPT | Performed by: INTERNAL MEDICINE

## 2023-01-10 PROCEDURE — 2022F DILAT RTA XM EVC RTNOPTHY: CPT | Performed by: INTERNAL MEDICINE

## 2023-01-10 NOTE — PATIENT INSTRUCTIONS
[facility] Activation    Thank you for requesting access to [facility]. Please follow the instructions below to securely access and download your online medical record. [facility] allows you to send messages to your doctor, view your test results, renew your prescriptions, schedule appointments, and more. How Do I Sign Up? In your internet browser, go to [email or web address]  Click on the First Time User? Click Here link in the Sign In box. You will be redirect to the New Member Sign Up page. Enter your [facility] Access Code exactly as it appears below. You will not need to use this code after youve completed the sign-up process. If you do not sign up before the expiration date, you must request a new code. [facility] Access Code: [email or web address]  Expires: 2023  7:48 AM (This is the date your [facility] access code will )    Enter the last four digits of your Social Security Number (xxxx) and Date of Birth (mm/dd/yyyy) as indicated and click Submit. You will be taken to the next sign-up page. Create a [facility] ID. This will be your [facility] login ID and cannot be changed, so think of one that is secure and easy to remember. Create a [facility] password. You can change your password at any time. Enter your Password Reset Question and Answer. This can be used at a later time if you forget your password. Enter your e-mail address. You will receive e-mail notification when new information is available in [address] Click Sign Up. You can now view and download portions of your medical record. Click the [place] link to download a portable copy of your medical information. Additional Information    If you have questions, please visit the Frequently Asked Questions section of the [facility] website at [email or web address] Remember, [facility] is NOT to be used for urgent needs. For medical emergencies, dial 911.

## 2023-01-10 NOTE — PROGRESS NOTES
Chief Complaint   Patient presents with    Pre-op Exam     3 most recent PHQ Screens 1/10/2023   PHQ Not Done -   Little interest or pleasure in doing things Not at all   Feeling down, depressed, irritable, or hopeless Not at all   Total Score PHQ 2 0     1. Have you been to the ER, urgent care clinic since your last visit? Hospitalized since your last visit? no    2. Have you seen or consulted any other health care providers outside of the [address] [place] [address] since your last visit? Include any pap smears or colon screening. no     Vitals signs are not patient reported, taken in the office objective readings.

## 2023-01-10 NOTE — PROGRESS NOTES
Tana Foss is a 67 y.o. male and presents with Pre-op Exam  .  Subjective: The patient presents for a pre-op cataract evaluationright  He has a history of cataracts  Dyslipidemia Review:  Patient presents for evaluation of lipids. Compliance with treatment thus far has been excellent. A repeat fasting lipid profile was not done. The patient does not use medications that may worsen dyslipidemias (corticosteroids, progestins, anabolic steroids, amiodarone, cyclosporine, olanzapine). The patient exercises some     Review of Systems  Constitutional: negative for fevers, chills, anorexia and weight loss  Eyes:   visual disturbance   ENT:   negative for tinnitus,sore throat,nasal congestion,ear pains. hoarseness  Respiratory:  negative for cough, hemoptysis, dyspnea,wheezing  CV:   negative for chest pain, palpitations, lower extremity edema  GI:   negative for nausea, vomiting, diarrhea, abdominal pain,melena  Endo:               negative for polyuria,polydipsia,polyphagia,heat intolerance  Genitourinary: negative for frequency, dysuria and hematuria  Integument:  negative for rash and pruritus  Hematologic:  negative for easy bruising and gum/nose bleeding  Musculoskel: negative for myalgias, arthralgias, back pain, muscle weakness, joint pain  Neurological:  negative for headaches, dizziness, vertigo, memory problems and gait   Behavl/Psych: negative for feelings of anxiety, depression, mood changes    Past Medical History:   Diagnosis Date    Diabetes [facility])      Past Surgical History:   Procedure Laterality Date    COLONOSCOPY N/A 5/11/2021    COLONOSCOPY performed by Carla Agustin MD at [facility]TH[facility]IC  2012    lumbar    HX PROSTATECTOMY  2012    HX UROLOGICAL  2012     Social History     Socioeconomic History    Marital status: SINGLE    Number of children: 2   Tobacco Use    Smoking status: Never    Smokeless tobacco: Never   Vaping Use    Vaping Use: Never used   Substance and Sexual Activity    Alcohol use: No    Drug use: No   Other Topics Concern     Service No    Blood Transfusions No    Caffeine Concern No    Occupational Exposure No    Hobby Hazards No    Sleep Concern No    Stress Concern No    Weight Concern No    Special Diet No    Back Care No    Exercise Yes    Bike Helmet No    Seat Belt No    Self-Exams No     No family history on file. Current Outpatient Medications   Medication Sig Dispense Refill    atorvastatin (LIPITOR) 40 mg tablet Take 1 Tablet by mouth daily. 90 Tablet 3    metFORMIN (GLUCOPHAGE) 1,000 mg tablet TAKE HALF TABLET BY MOUTH TWICE DAILY WITH  A  MEAL 90 Tablet 3    pioglitazone (ACTOS) 30 mg tablet Take 1 Tablet by mouth daily. 90 Tablet 3    glipiZIDE (GLUCOTROL) 5 mg tablet TAKE  One   TABLET BY MOUTH TWICE DAILY, twenty min  Before b-fast and dinner  STOP 10 mg dose 180 Tablet 3    SITagliptin (JANUVIA) 100 mg tablet Take 1 Tablet by mouth daily. 90 Tablet 3    Lactobacillus acidophilus (PROBIOTIC PO) Take  by mouth. No Known Allergies    Objective: There were no vitals taken for this visit. Physical Exam:   General appearance - alert, well appearing, and in no distress  Mental status - alert, oriented to person, place, and time  EYE-DAVIAN, EOMI, corneas normal, no foreign bodies,cataract right  ENT-ENT exam normal, no neck nodes or sinus tenderness  Nose - normal and patent, no erythema, discharge or polyps  Mouth - mucous membranes moist, pharynx normal without lesions  Neck - supple, no significant adenopathy   Chest - clear to auscultation, no wheezes, rales or rhonchi, symmetric air entry   Heart - normal rate, regular rhythm, normal S1, S2, no murmurs, rubs, clicks or gallops   Abdomen - soft, nontender, nondistended, no masses or organomegaly  Lymph- no adenopathy palpable  Ext-peripheral pulses normal, no pedal edema, no clubbing or cyanosis  Skin-Warm and dry.  no hyperpigmentation, vitiligo, or suspicious lesions  Neuro -alert, oriented, normal speech, no focal findings or movement disorder noted  Neck-normal C-spine, no tenderness, full ROM without pain      Results for orders placed or performed in visit on 01/10/23   AMB POC GLUCOSE BLOOD, BY GLUCOSE MONITORING DEVICE   Result Value Ref Range    Glucose  MG/DL   AMB POC HEMOGLOBIN A1C   Result Value Ref Range    Hemoglobin A1c (POC) 6.8 %       Assessment/Plan:    ICD-10-CM ICD-9-CM    1. 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 Type II or unspecified type diabetes mellitus with neurological manifestations, uncontrolled(250.62) (HCC)  E11.49 250.62 AMB POC GLUCOSE BLOOD, BY GLUCOSE MONITORING DEVICE      AMB POC HEMOGLOBIN A1C      3. Hypercholesteremia  E78.00 272.0       4. Severe obesity (BMI 35.0-39. 9) with comorbidity ([facility] [address]  E66.01 278.01         Orders Placed This Encounter    AMB POC GLUCOSE BLOOD, BY GLUCOSE MONITORING DEVICE    AMB POC HEMOGLOBIN A1C       call if any problems  MEDICALLY STABLE FOR CATARACT SURGERY  Patient Instructions   Minubo Activation    Thank you for requesting access to [facility]. Please follow the instructions below to securely access and download your online medical record. Min[facility]o allows you to send messages to your doctor, view your test results, renew your prescriptions, schedule appointments, and more. How Do I Sign Up? In your internet browser, go to [email or web address]  Click on the First Time User? Click Here link in the Sign In box. You will be redirect to the New Member Sign Up page. Enter your Minubo Access Code exactly as it appears below. You will not need to use this code after youve completed the sign-up process. If you do not sign up before the expiration date, you must request a new code.     Minubo Access Code: [email or web address]  Expires: 2023  7:48 AM (This is the date your Minubo access code will )    Enter the last four digits of your Social Security Number (xxxx) and Date of Birth (mm/dd/yyyy) as indicated and click Submit. You will be taken to the next sign-up page. Create a Hundo ID. This will be your Hundo login ID and cannot be changed, so think of one that is secure and easy to remember. Create a Hundo password. You can change your password at any time. Enter your Password Reset Question and Answer. This can be used at a later time if you forget your password. Enter your e-mail address. You will receive e-mail notification when new information is available in [address] Click Sign Up. You can now view and download portions of your medical record. Click the MiCarga link to download a portable copy of your medical information. Additional Information    If you have questions, please visit the Frequently Asked Questions section of the Hundo website at [email or web address] Remember, Hundo is NOT to be used for urgent needs. For medical emergencies, dial 911. Follow-up and Dispositions    Return in about 3 months (around 4/10/2023), or if symptoms worsen or fail to improve.

## 2023-05-23 RX ORDER — ATORVASTATIN CALCIUM 40 MG/1
40 TABLET, FILM COATED ORAL DAILY
COMMUNITY
Start: 2022-10-03

## 2023-05-23 RX ORDER — PIOGLITAZONEHYDROCHLORIDE 30 MG/1
30 TABLET ORAL DAILY
COMMUNITY
Start: 2022-10-03

## 2023-05-23 RX ORDER — GLIPIZIDE 5 MG/1
TABLET ORAL
COMMUNITY
Start: 2022-10-03 | End: 2023-06-30 | Stop reason: SDUPTHER

## 2023-06-02 ENCOUNTER — OFFICE VISIT (OUTPATIENT)
Facility: CLINIC | Age: 73
End: 2023-06-02

## 2023-06-02 VITALS
SYSTOLIC BLOOD PRESSURE: 138 MMHG | BODY MASS INDEX: 37.52 KG/M2 | DIASTOLIC BLOOD PRESSURE: 80 MMHG | RESPIRATION RATE: 20 BRPM | TEMPERATURE: 98.3 F | WEIGHT: 268 LBS | HEART RATE: 64 BPM | OXYGEN SATURATION: 98 % | HEIGHT: 71 IN

## 2023-06-02 DIAGNOSIS — M54.16 LUMBAR RADICULOPATHY: Primary | ICD-10-CM

## 2023-06-02 DIAGNOSIS — E78.00 PURE HYPERCHOLESTEROLEMIA, UNSPECIFIED: ICD-10-CM

## 2023-06-02 DIAGNOSIS — Z00.00 MEDICARE ANNUAL WELLNESS VISIT, SUBSEQUENT: ICD-10-CM

## 2023-06-02 DIAGNOSIS — E11.49 TYPE 2 DIABETES MELLITUS WITH OTHER DIABETIC NEUROLOGICAL COMPLICATION (HCC): ICD-10-CM

## 2023-06-02 RX ORDER — METHYLPREDNISOLONE SODIUM SUCCINATE 40 MG/ML
40 INJECTION, POWDER, LYOPHILIZED, FOR SOLUTION INTRAMUSCULAR; INTRAVENOUS DAILY
Status: SHIPPED | OUTPATIENT
Start: 2023-06-02

## 2023-06-02 RX ORDER — PREDNISONE 20 MG/1
20 TABLET ORAL 2 TIMES DAILY
Qty: 10 TABLET | Refills: 0 | Status: SHIPPED | OUTPATIENT
Start: 2023-06-02 | End: 2023-06-07

## 2023-06-02 RX ORDER — PREDNISONE 20 MG/1
20 TABLET ORAL 2 TIMES DAILY
Qty: 10 TABLET | Refills: 0 | Status: SHIPPED | OUTPATIENT
Start: 2023-06-02 | End: 2023-06-02

## 2023-06-02 RX ADMIN — METHYLPREDNISOLONE SODIUM SUCCINATE 40 MG: 40 INJECTION, POWDER, LYOPHILIZED, FOR SOLUTION INTRAMUSCULAR; INTRAVENOUS at 09:32

## 2023-06-02 SDOH — ECONOMIC STABILITY: HOUSING INSECURITY
IN THE LAST 12 MONTHS, WAS THERE A TIME WHEN YOU DID NOT HAVE A STEADY PLACE TO SLEEP OR SLEPT IN A SHELTER (INCLUDING NOW)?: NO

## 2023-06-02 SDOH — ECONOMIC STABILITY: FOOD INSECURITY: WITHIN THE PAST 12 MONTHS, YOU WORRIED THAT YOUR FOOD WOULD RUN OUT BEFORE YOU GOT MONEY TO BUY MORE.: NEVER TRUE

## 2023-06-02 SDOH — ECONOMIC STABILITY: INCOME INSECURITY: HOW HARD IS IT FOR YOU TO PAY FOR THE VERY BASICS LIKE FOOD, HOUSING, MEDICAL CARE, AND HEATING?: NOT VERY HARD

## 2023-06-02 SDOH — ECONOMIC STABILITY: FOOD INSECURITY: WITHIN THE PAST 12 MONTHS, THE FOOD YOU BOUGHT JUST DIDN'T LAST AND YOU DIDN'T HAVE MONEY TO GET MORE.: NEVER TRUE

## 2023-06-02 ASSESSMENT — PATIENT HEALTH QUESTIONNAIRE - PHQ9
1. LITTLE INTEREST OR PLEASURE IN DOING THINGS: 0
SUM OF ALL RESPONSES TO PHQ9 QUESTIONS 1 & 2: 0
SUM OF ALL RESPONSES TO PHQ QUESTIONS 1-9: 0
2. FEELING DOWN, DEPRESSED OR HOPELESS: 0
SUM OF ALL RESPONSES TO PHQ QUESTIONS 1-9: 0

## 2023-06-02 NOTE — PROGRESS NOTES
Chandan Aquino is a 67 y.o. male and presents with Back Pain  . Subjective:    Chandan Aquino is a 67 y.o. male and presents for annual Medicare Wellness Visit. Back Pain Review:  Patient presents for evaluation of low back problems. Symptoms have been present for  weeks and include pain in lower back (dull, moderate in character; 9/10 in severity). Initial inciting event: unknown. Symptoms are worst: at times. Alleviating factors identifiable by patient are lying flat, medication . Exacerbating factors identifiable by patient are bending forwards, bending backwards. Treatments so far initiated by patient: medication Previous lower back problems: reported. Previous workup: none. Dyslipidemia Review:  Patient presents for evaluation of lipids. Compliance with treatment thus far has been excellent. A repeat fasting lipid profile was not done. The patient does not use medications that may worsen dyslipidemias (corticosteroids, progestins, anabolic steroids, amiodarone, cyclosporine, olanzapine). The patient exercises some    Diabetes Mellitus Review:  He has diabetes mellitus. Diabetic ROS - medication compliance: compliant all of the time, diabetic diet compliance: compliant all of the time, home glucose monitoring: is performed. Known diabetic complications: none  Cardiovascular risk factors: family history, dyslipidemia, diabetes mellitus, obesity, hypertension  Current diabetic medications include oral agents/   Eye exam current (within one year): no  Weight trend: stable  Prior visit with dietician: no  Current diet: \"healthy\" diet  in general  Current exercise: walking  Current monitoring regimen: home blood tests - daily  Home blood sugar records: trend: stable  Any episodes of hypoglycemia? no  Is He on ACE inhibitor or angiotensin II receptor blocker? Yes         Problem List: Reviewed with patient and discussed risk factors.     Patient Active Problem List   Diagnosis    Cortical age-related

## 2023-06-02 NOTE — PROGRESS NOTES
1. Have you been to the ER, urgent care clinic since your last visit? Hospitalized since your last visit?no    2. Have you seen or consulted any other health care providers outside of the [address] [place] [address] since your last visit? Include any pap smears or colon screening.  no     Chief Complaint   Patient presents with    Back Pain         PHQ-9 Total Score: 0 (6/2/2023  9:09 AM)

## 2023-06-02 NOTE — PATIENT INSTRUCTIONS
Thank you for enrolling in [address] Please follow the instructions below to securely access your online medical record. [facility] allows you to send messages to your doctor, view your test results, renew your prescriptions, schedule appointments, and more. How Do I Sign Up? In your Internet browser, go to [email or web address]  Click on the Sign Up Now link in the Sign In box. You will see the New Member Sign Up page. Enter your [email or web address] Access Code exactly as it appears below. You will not need to use this code after youve completed the sign-up process. If you do not sign up before the expiration date, you must request a new code. [facility] Access Code: Activation code not generated  Current [facility] Status: Patient Declined    Enter your Social Security Number ([id number]) and Date of Birth (mm/dd/yyyy) as indicated and click Submit. You will be taken to the next sign-up page. Create a [facility] ID. This will be your [facility] login ID and cannot be changed, so think of one that is secure and easy to remember. Create a [facility] password. You can change your password at any time. Enter your Password Reset Question and Answer. This can be used at a later time if you forget your password. Enter your e-mail address. You will receive e-mail notification when new information is available in [address] Click Sign Up. You can now view your medical record. Additional Information  If you have questions, please contact your physician practice where you receive care. Remember, [facility] is NOT to be used for urgent needs. For medical emergencies, dial 911.

## 2023-06-16 DIAGNOSIS — M54.16 LUMBAR RADICULOPATHY: Primary | ICD-10-CM

## 2023-06-27 ENCOUNTER — HOSPITAL ENCOUNTER (OUTPATIENT)
Facility: HOSPITAL | Age: 73
Discharge: HOME | End: 2023-06-30
Attending: INTERNAL MEDICINE
Payer: COMMERCIAL

## 2023-06-27 DIAGNOSIS — M54.16 LUMBAR RADICULOPATHY: ICD-10-CM

## 2023-06-27 PROCEDURE — A9579 GAD-BASE MR CONTRAST NOS,1ML: HCPCS | Performed by: INTERNAL MEDICINE

## 2023-06-27 PROCEDURE — 6360000004 HC RX CONTRAST MEDICATION: Performed by: INTERNAL MEDICINE

## 2023-06-27 PROCEDURE — 72158 MRI LUMBAR SPINE W/O & W/DYE: CPT

## 2023-06-27 RX ADMIN — GADOTERIDOL 20 ML: 279.3 INJECTION, SOLUTION INTRAVENOUS at 07:39

## 2023-06-30 ENCOUNTER — OFFICE VISIT (OUTPATIENT)
Facility: CLINIC | Age: 73
End: 2023-06-30
Payer: MEDICARE

## 2023-06-30 VITALS
DIASTOLIC BLOOD PRESSURE: 88 MMHG | WEIGHT: 264 LBS | BODY MASS INDEX: 36.96 KG/M2 | RESPIRATION RATE: 16 BRPM | OXYGEN SATURATION: 98 % | HEART RATE: 77 BPM | SYSTOLIC BLOOD PRESSURE: 138 MMHG | TEMPERATURE: 98 F | HEIGHT: 71 IN

## 2023-06-30 DIAGNOSIS — E11.9 TYPE 2 DIABETES MELLITUS WITHOUT COMPLICATION, WITHOUT LONG-TERM CURRENT USE OF INSULIN (HCC): ICD-10-CM

## 2023-06-30 DIAGNOSIS — M48.07 SPINAL STENOSIS OF LUMBOSACRAL REGION: ICD-10-CM

## 2023-06-30 DIAGNOSIS — M54.16 LUMBAR RADICULOPATHY: Primary | ICD-10-CM

## 2023-06-30 DIAGNOSIS — E78.00 PURE HYPERCHOLESTEROLEMIA, UNSPECIFIED: ICD-10-CM

## 2023-06-30 LAB
ALBUMIN SERPL-MCNC: 4.2 G/DL (ref 3.5–5)
ALBUMIN/GLOB SERPL: 1.1 (ref 1.1–2.2)
ALP SERPL-CCNC: 77 U/L (ref 45–117)
ALT SERPL-CCNC: 33 U/L (ref 12–78)
ANION GAP SERPL CALC-SCNC: 5 MMOL/L (ref 5–15)
AST SERPL-CCNC: 40 U/L (ref 15–37)
BILIRUB SERPL-MCNC: 0.4 MG/DL (ref 0.2–1)
BUN SERPL-MCNC: 11 MG/DL (ref 6–20)
BUN/CREAT SERPL: 12 (ref 12–20)
CALCIUM SERPL-MCNC: 9.6 MG/DL (ref 8.5–10.1)
CHLORIDE SERPL-SCNC: 104 MMOL/L (ref 97–108)
CHOLEST SERPL-MCNC: 152 MG/DL
CO2 SERPL-SCNC: 28 MMOL/L (ref 21–32)
CREAT SERPL-MCNC: 0.95 MG/DL (ref 0.7–1.3)
ERYTHROCYTE [DISTWIDTH] IN BLOOD BY AUTOMATED COUNT: 11.8 % (ref 11.5–14.5)
EST. AVERAGE GLUCOSE BLD GHB EST-MCNC: 289 MG/DL
GLOBULIN SER CALC-MCNC: 3.8 G/DL (ref 2–4)
GLUCOSE SERPL-MCNC: 308 MG/DL (ref 65–100)
HBA1C MFR BLD: 11.7 % (ref 4–5.6)
HCT VFR BLD AUTO: 40.3 % (ref 36.6–50.3)
HDLC SERPL-MCNC: 70 MG/DL
HDLC SERPL: 2.2 (ref 0–5)
HGB BLD-MCNC: 12.9 G/DL (ref 12.1–17)
LDLC SERPL CALC-MCNC: 72 MG/DL (ref 0–100)
MCH RBC QN AUTO: 31.1 PG (ref 26–34)
MCHC RBC AUTO-ENTMCNC: 32 G/DL (ref 30–36.5)
MCV RBC AUTO: 97.1 FL (ref 80–99)
NRBC # BLD: 0 K/UL (ref 0–0.01)
NRBC BLD-RTO: 0 PER 100 WBC
PLATELET # BLD AUTO: 204 K/UL (ref 150–400)
PMV BLD AUTO: 12.1 FL (ref 8.9–12.9)
POTASSIUM SERPL-SCNC: 4.5 MMOL/L (ref 3.5–5.1)
PROT SERPL-MCNC: 8 G/DL (ref 6.4–8.2)
RBC # BLD AUTO: 4.15 M/UL (ref 4.1–5.7)
SODIUM SERPL-SCNC: 137 MMOL/L (ref 136–145)
TRIGL SERPL-MCNC: 50 MG/DL
VLDLC SERPL CALC-MCNC: 10 MG/DL
WBC # BLD AUTO: 5 K/UL (ref 4.1–11.1)

## 2023-06-30 PROCEDURE — 99214 OFFICE O/P EST MOD 30 MIN: CPT | Performed by: INTERNAL MEDICINE

## 2023-06-30 PROCEDURE — 1123F ACP DISCUSS/DSCN MKR DOCD: CPT | Performed by: INTERNAL MEDICINE

## 2023-06-30 RX ORDER — GLIPIZIDE 5 MG/1
TABLET ORAL
Qty: 60 TABLET | Refills: 12 | Status: SHIPPED | OUTPATIENT
Start: 2023-06-30

## 2023-06-30 ASSESSMENT — PATIENT HEALTH QUESTIONNAIRE - PHQ9
1. LITTLE INTEREST OR PLEASURE IN DOING THINGS: 0
SUM OF ALL RESPONSES TO PHQ QUESTIONS 1-9: 0
SUM OF ALL RESPONSES TO PHQ9 QUESTIONS 1 & 2: 0
SUM OF ALL RESPONSES TO PHQ QUESTIONS 1-9: 0
2. FEELING DOWN, DEPRESSED OR HOPELESS: 0
SUM OF ALL RESPONSES TO PHQ QUESTIONS 1-9: 0
SUM OF ALL RESPONSES TO PHQ QUESTIONS 1-9: 0

## 2023-08-07 ENCOUNTER — HOSPITAL ENCOUNTER (OUTPATIENT)
Facility: HOSPITAL | Age: 73
Setting detail: RECURRING SERIES
Discharge: HOME | End: 2023-08-10
Payer: MEDICARE

## 2023-08-07 PROCEDURE — 97162 PT EVAL MOD COMPLEX 30 MIN: CPT

## 2023-08-07 NOTE — THERAPY EVALUATION
[facility]  [facility], [address] [place] [address]  Ph: [phone]     Fax: [phone]       PHYSICAL THERAPY - MEDICARE EVALUATION/PLAN OF CARE NOTE (updated 3/23)      Date: 2023          Patient Name:  Ricky Adair :  1950   Medical   Diagnosis:  Other low back pain [M54.59]  Spinal stenosis, lumbar region without neurogenic claudication [M48.061] Treatment Diagnosis:  M54.42  LUMBAGO WITH SCIATICA, LEFT SIDE    Referral Source:  Claudine Kelly MD Provider #:  [id number]                Insurance: Payor: Ricky Jacobo / Plan: [employer] HMO / Product Type: *No Product type* /      Patient  verified yes     Visit #   Current  / Total 1 12   Time   In / Out 1:00pm 2:02pm   Total Treatment Time 60   Total Timed Codes 7   1:1 Treatment Time 7      [facility] Totals Reminder:  bill using total billable   min of TIMED therapeutic procedures and modalities. 8-22 min = 1 unit; 23-37 min = 2 units; 38-52 min = 3 units;  53-67 min = 4 units; 68-82 min = 5 units           SUBJECTIVE  Pain Level (0-10 scale): 7/10 when standing  []constant []intermittent [x]improving []worsening []no change since onset  medication helping; pt resting more and not working out    Any medication changes, allergies to medications, adverse drug reactions, diagnosis change, or new procedure performed?: [x] No    [] Yes (see summary sheet for update)  Medications: Verified on Patient Summary List    Start of Care: 2023  Onset Date: 3-4 months ago   Current symptoms/Complaints: See below:  Mechanism of Injury: Pt was working out at [employer]. He was doing bent rows on the weight bench and had severe low back pain. He stopped the aggressive exercises and it did ease, but then he went to the gym and did some abdominal \"crunches\" and had the same flare up. He went to his PCP who did MRI and then he went to neurology who referred him to PT.   He was told he has

## 2023-08-17 ENCOUNTER — HOSPITAL ENCOUNTER (OUTPATIENT)
Facility: HOSPITAL | Age: 73
Setting detail: RECURRING SERIES
Discharge: HOME | End: 2023-08-20
Payer: MEDICARE

## 2023-08-17 PROCEDURE — 97110 THERAPEUTIC EXERCISES: CPT

## 2023-08-17 PROCEDURE — 97112 NEUROMUSCULAR REEDUCATION: CPT

## 2023-08-17 NOTE — PROGRESS NOTES
Partially met  Date:     Pt will try a back support to increase his tolerance to weight-bearing activities to allow for improved walking without leaning on objects for support. [] Met [] Not met [] Partially met  Date:     Pt will demonstrate improved postural awareness for sitting and standing and can verbalize reasoning and ergonomic factors related to this to decrease pain. [] Met [] Not met [] Partially met  Date:     Long Term Goals: To be accomplished in 12 treatments  Pt will have improved AMPAC score by 5%. [] Met [] Not met [] Partially met Date:      Pt will be able to stand greater than 10 minutes without pain > 4/10 so they can do ADL's like wash dishes. [] Met [] Not met [] Partially met Date:      Pt will be able to ambulate community distances without increase of pain or difficulty to get groceries, medicine, etc.      [] Met [] Not met [] Partially met Date:      Pt will be able to go up and down steps without difficulty or HHA for safe gait. [] Met [] Not met [] Partially met Date:      Pt will be able to bend down to put on socks/shoes without difficulty due to improved ROM. [] Met [] Not met [] Partially met Date:      Pt will be able to return to his gym activities such as walking in the pool or on the treadmill.      [] Met [] Not met [] Partially met  Date:     PLAN:  Treatment Plan may include any combination of the followin Therapeutic Exercise, 51190 Neuromuscular Re-Education, 04253 Manual Therapy, 46808 Electrical Stim unattended, 06958 Gait Training, and [id number] Ultrasound  Frequency / Duration: Patient to be seen 2 times per week for 12 treatments  yes  Continue plan of care  Re-Cert Due:   [x]  Upgrade activities as tolerated  []  Discharge due to :  []  Other:      Agata Pearson, SHANI       2023       7:28 AM

## 2023-08-22 ENCOUNTER — HOSPITAL ENCOUNTER (OUTPATIENT)
Facility: HOSPITAL | Age: 73
Setting detail: RECURRING SERIES
Discharge: HOME | End: 2023-08-25
Payer: MEDICARE

## 2023-08-22 PROCEDURE — 97112 NEUROMUSCULAR REEDUCATION: CPT

## 2023-08-22 PROCEDURE — 97110 THERAPEUTIC EXERCISES: CPT

## 2023-08-22 NOTE — PROGRESS NOTES
PHYSICAL THERAPY - MEDICARE DAILY TREATMENT NOTE (updated 3/23)      Date: 2023          Patient Name:  Kwasi Grossman :  1950   Medical   Diagnosis:  Other low back pain [M54.59]  Spinal stenosis, lumbar region without neurogenic claudication [M48.061] Treatment Diagnosis:  M54.59  OTHER LOWER BACK PAIN    Referral Source:  Madelyn Van MD Insurance:   Payor: Malina Davis / Plan: Elise Revering PLUS HMO / Product Type: *No Product type* /                     Patient  verified yes     Visit #   Current  / Total 3 12   Time   In / Out 9:00am 0953 am   Total Treatment Time 50 min   Total Timed Codes 50 min   1:1 Treatment Time 50 min      [facility] Totals Reminder:  bill using total billable   min of TIMED therapeutic procedures and modalities. 8-22 min = 1 unit; 23-37 min = 2 units; 38-52 min = 3 units; 53-67 min = 4 units; 68-82 min = 5 units            SUBJECTIVE    Pain Level (0-10 scale): 5/10    Any medication changes, allergies to medications, adverse drug reactions, diagnosis change, or new procedure performed?: [x] No    [] Yes (see summary sheet for update)  Medications: Verified on Patient Summary List    Subjective functional status/changes:     Pt presents with R side lean, stated most of his pain is around the left lumbar area. He is going to see the doctor tomorrow to ask him about the brace     OBJECTIVE      Therapeutic Procedures: Tx Min Billable or 1:1 Min (if diff from Tx Min) Procedure, Rationale, Specifics   35  05072 Therapeutic Exercise (timed):  increase ROM, strength, coordination, balance, and proprioception to improve patient's ability to progress to PLOF and address remaining functional goals.  (see flow sheet as applicable)     Details if applicable:     15  57501 Neuromuscular Re-Education (timed):  improve balance, coordination, kinesthetic sense, posture, core stability and proprioception to improve patient's ability to develop conscious control of individual muscles

## 2023-08-25 ENCOUNTER — HOSPITAL ENCOUNTER (OUTPATIENT)
Facility: HOSPITAL | Age: 73
Setting detail: RECURRING SERIES
Discharge: HOME | End: 2023-08-28
Payer: MEDICARE

## 2023-08-25 PROCEDURE — G0283 ELEC STIM OTHER THAN WOUND: HCPCS

## 2023-08-25 PROCEDURE — 97110 THERAPEUTIC EXERCISES: CPT

## 2023-08-25 NOTE — PROGRESS NOTES
tolerate new exercises without any difficulty. Added modalities due to high pain levels. Patient reported decrease in pain post treatment session. Will increase exercises as tolerated. Patient will continue to benefit from skilled PT / OT services to modify and progress therapeutic interventions, analyze and address functional mobility deficits, analyze and address ROM deficits, and analyze and address strength deficits to address functional deficits and attain remaining goals. Progress toward goals / Updated goals:  []  See Progress Note/Recertification    Pt Goals: \"Be able to walk without it hurting and get back to walking in the pool at the gym\"  Short Term Goals: To be accomplished in 4-6 treatments  Pt will be Independent with HEP to assist with progression of therapy and decrease soreness. [] Met [] Not met [x] Partially met  Date: 8/22 sometimes      Pt will try a back support to increase his tolerance to weight-bearing activities to allow for improved walking without leaning on objects for support. [] Met [x] Not met [] Partially met  Date: 8/22 wants to show the doc the brace first      Pt will demonstrate improved postural awareness for sitting and standing and can verbalize reasoning and ergonomic factors related to this to decrease pain. [] Met [] Not met [] Partially met  Date:     Long Term Goals: To be accomplished in 12 treatments  Pt will have improved AMPAC score by 5%. [] Met [] Not met [] Partially met Date:      Pt will be able to stand greater than 10 minutes without pain > 4/10 so they can do ADL's like wash dishes. [] Met [] Not met [] Partially met Date:      Pt will be able to ambulate community distances without increase of pain or difficulty to get groceries, medicine, etc.      [] Met [] Not met [] Partially met Date:      Pt will be able to go up and down steps without difficulty or HHA for safe gait.       [] Met [] Not met [] Partially met Date:      Pt

## 2023-08-28 RX ORDER — DICLOFENAC SODIUM 75 MG/1
75 TABLET, DELAYED RELEASE ORAL 2 TIMES DAILY
Qty: 180 TABLET | Refills: 3 | Status: SHIPPED | OUTPATIENT
Start: 2023-08-28

## 2023-08-29 ENCOUNTER — HOSPITAL ENCOUNTER (OUTPATIENT)
Facility: HOSPITAL | Age: 73
Setting detail: RECURRING SERIES
Discharge: HOME | End: 2023-09-01
Payer: MEDICARE

## 2023-08-29 PROCEDURE — G0283 ELEC STIM OTHER THAN WOUND: HCPCS

## 2023-08-29 PROCEDURE — 97110 THERAPEUTIC EXERCISES: CPT

## 2023-08-29 NOTE — PROGRESS NOTES
socks/shoes without difficulty due to improved ROM. [] Met [] Not met [] Partially met Date:      Pt will be able to return to his gym activities such as walking in the pool or on the treadmill.      [] Met [] Not met [] Partially met  Date:     PLAN:  Treatment Plan may include any combination of the followin Therapeutic Exercise, 22143 Neuromuscular Re-Education, 03106 Manual Therapy, 27636 Electrical Stim unattended, 15235 Gait Training, and [id number] Ultrasound    Frequency / Duration: Patient to be seen 2 times per week for 12 treatments    yes  Continue plan of care  Re-Cert Due:   [x]  Upgrade activities as tolerated  []  Discharge due to :  []  Other:      Alma Archer PTA       2023       7:02 AM

## 2023-08-31 ENCOUNTER — HOSPITAL ENCOUNTER (OUTPATIENT)
Facility: HOSPITAL | Age: 73
Setting detail: RECURRING SERIES
End: 2023-08-31
Payer: MEDICARE

## 2023-08-31 PROCEDURE — 97112 NEUROMUSCULAR REEDUCATION: CPT

## 2023-08-31 PROCEDURE — G0283 ELEC STIM OTHER THAN WOUND: HCPCS

## 2023-08-31 PROCEDURE — 97110 THERAPEUTIC EXERCISES: CPT

## 2023-08-31 NOTE — PROGRESS NOTES
PHYSICAL THERAPY - MEDICARE DAILY TREATMENT NOTE (updated 3/23)      Date: 2023          Patient Name:  Ricky Adair :  1950   Medical   Diagnosis:  Other low back pain [M54.59]  Spinal stenosis, lumbar region without neurogenic claudication [M48.061] Treatment Diagnosis:  M54.59  OTHER LOWER BACK PAIN    Referral Source:  Claudine Kelly MD Insurance:   Payor: Ricky Adamsona / Plan: [employer] HMO / Product Type: *No Product type* /                     Patient  verified yes     Visit #   Current  / Total 6 12   Time   In / Out 7:00 am 08:02 am   Total Treatment Time 60 min   Total Timed Codes 45  min   1:1 Treatment Time 45 min      [facility] Totals Reminder:  bill using total billable   min of TIMED therapeutic procedures and modalities. 8-22 min = 1 unit; 23-37 min = 2 units; 38-52 min = 3 units; 53-67 min = 4 units; 68-82 min = 5 units            SUBJECTIVE    Pain Level (0-10 scale): 3-4/10    Any medication changes, allergies to medications, adverse drug reactions, diagnosis change, or new procedure performed?: [x] No    [] Yes (see summary sheet for update)  Medications: Verified on Patient Summary List    Subjective functional status/changes:     Patient stated he is starting to see improvement. \"I walked out of her the last session feeling so much better . I'm walking straighter, than my normal.\" Patient also stated he was able to mow his grass with push . Patient does not have cane today. OBJECTIVE      Therapeutic Procedures: Tx Min Billable or 1:1 Min (if diff from Tx Min) Procedure, Rationale, Specifics   35  84746 Therapeutic Exercise (timed):  increase ROM, strength, coordination, balance, and proprioception to improve patient's ability to progress to PLOF and address remaining functional goals.  (see flow sheet as applicable)     Details if applicable:     10   Neuromuscular Re-Education (timed):  improve balance, coordination, kinesthetic sense, posture, socks/shoes without difficulty due to improved ROM. [] Met [] Not met [] Partially met Date:      Pt will be able to return to his gym activities such as walking in the pool or on the treadmill.      [] Met [] Not met [] Partially met  Date:     PLAN:  Treatment Plan may include any combination of the followin Therapeutic Exercise, 10432 Neuromuscular Re-Education, 65953 Manual Therapy, 65508 Electrical Stim unattended, 75594 Gait Training, and [id number] Ultrasound    Frequency / Duration: Patient to be seen 2 times per week for 12 treatments    yes  Continue plan of care  Re-Cert Due:   [x]  Upgrade activities as tolerated  []  Discharge due to :  []  Other:      Mahnaz Grissom, PTA       2023       7:06 AM

## 2023-09-05 ENCOUNTER — HOSPITAL ENCOUNTER (OUTPATIENT)
Facility: HOSPITAL | Age: 73
Setting detail: RECURRING SERIES
Discharge: HOME | End: 2023-09-08
Payer: MEDICARE

## 2023-09-05 PROCEDURE — 97110 THERAPEUTIC EXERCISES: CPT

## 2023-09-05 PROCEDURE — 97112 NEUROMUSCULAR REEDUCATION: CPT

## 2023-09-05 PROCEDURE — G0283 ELEC STIM OTHER THAN WOUND: HCPCS

## 2023-09-05 NOTE — PROGRESS NOTES
PHYSICAL THERAPY - MEDICARE DAILY TREATMENT NOTE (updated 3/23)      Date: 2023          Patient Name:  Alayna Castanon :  1950   Medical   Diagnosis:  Other low back pain [M54.59]  Spinal stenosis, lumbar region without neurogenic claudication [M48.061] Treatment Diagnosis:  M54.59  OTHER LOWER BACK PAIN    Referral Source:  Eva Estrada MD Insurance:   Payor: Mallory Whitakerhadley / Plan: [employer]i An[employer] PLUS HMO / Product Type: *No Product type* /                     Patient  verified yes     Visit #   Current  / Total 7 12   Time   In / Out 9:05am 10:06am   Total Treatment Time 61min   Total Timed Codes 46min   1:1 Treatment Time 46min      [facility] Totals Reminder:  bill using total billable   min of TIMED therapeutic procedures and modalities. 8-22 min = 1 unit; 23-37 min = 2 units; 38-52 min = 3 units; 53-67 min = 4 units; 68-82 min = 5 units            SUBJECTIVE    Pain Level (0-10 scale): 3-4/10    Any medication changes, allergies to medications, adverse drug reactions, diagnosis change, or new procedure performed?: [x] No    [] Yes (see summary sheet for update)  Medications: Verified on Patient Summary List    Subjective functional status/changes:     Patient reports some days he feels like he is getting there and others it is worse. Pt feels it is rough to be up straight as once the pain kicks in he bends forward. Reports he has been completing HEP. Brought L/S support to session. OBJECTIVE      Therapeutic Procedures: Tx Min Billable or 1:1 Min (if diff from Tx Min) Procedure, Rationale, Specifics   36  27808 Therapeutic Exercise (timed):  increase ROM, strength, coordination, balance, and proprioception to improve patient's ability to progress to PLOF and address remaining functional goals.  (see flow sheet as applicable)     Details if applicable:     10  13161 Neuromuscular Re-Education (timed):  improve balance, coordination, kinesthetic sense, posture, core stability and

## 2023-09-07 ENCOUNTER — HOSPITAL ENCOUNTER (OUTPATIENT)
Facility: HOSPITAL | Age: 73
Setting detail: RECURRING SERIES
Discharge: HOME | End: 2023-09-10
Payer: MEDICARE

## 2023-09-07 PROCEDURE — G0283 ELEC STIM OTHER THAN WOUND: HCPCS

## 2023-09-07 PROCEDURE — 97110 THERAPEUTIC EXERCISES: CPT

## 2023-09-07 NOTE — PROGRESS NOTES
hamstrings L > R. He has also made improvements with Am-Pac score improving by 17 %. He is able to ascend and descend the stairs but needs to use handrails. He has not returned to the gym at this time. He is also currently using a cane to help with ambulation . He would benefit from continuation for core strengthening. Patient will continue to benefit from skilled PT / OT services to modify and progress therapeutic interventions, analyze and address functional mobility deficits, analyze and address ROM deficits, and analyze and address strength deficits to address functional deficits and attain remaining goals. Progress toward goals / Updated goals:  []  See Progress Note/Recertification    Pt Goals: \"Be able to walk without it hurting and get back to walking in the pool at the gym\"  Short Term Goals: To be accomplished in 4-6 treatments  Pt will be Independent with HEP to assist with progression of therapy and decrease soreness. [x] Met [] Not met [] Partially met  Date: 8/31  Pt will try a back support to increase his tolerance to weight-bearing activities to allow for improved walking without leaning on objects for support. [] Met [] Not met [x] Partially met  Date: 9/5 pt brought to session and was instructed on how to put on properly      Pt will demonstrate improved postural awareness for sitting and standing and can verbalize reasoning and ergonomic factors related to this to decrease pain. [x] Met [] Not met [] Partially met  Date:8/29     Long Term Goals: To be accomplished in 12 treatments  Pt will have improved AMPAC score by 5%. [x] Met [] Not met [] Partially met Date: 9/7     Pt will be able to stand greater than 10 minutes without pain > 4/10 so they can do ADL's like wash dishes.       [] Met [x] Not met [] Partially met Date: 9/7 Patient can stand 10 min but pain increases to 5-6/10     Pt will be able to ambulate community distances without increase of pain or difficulty to

## 2023-09-07 NOTE — PROGRESS NOTES
any questions/comments please contact us directly at [phone]. Thank you for allowing us to assist in the care of your patient.

## 2023-09-12 ENCOUNTER — HOSPITAL ENCOUNTER (OUTPATIENT)
Facility: HOSPITAL | Age: 73
Setting detail: RECURRING SERIES
Discharge: HOME | End: 2023-09-15
Payer: MEDICARE

## 2023-09-14 ENCOUNTER — APPOINTMENT (OUTPATIENT)
Facility: HOSPITAL | Age: 73
End: 2023-09-14
Payer: MEDICARE

## 2023-09-19 ENCOUNTER — APPOINTMENT (OUTPATIENT)
Facility: HOSPITAL | Age: 73
End: 2023-09-19
Payer: MEDICARE

## 2023-09-21 ENCOUNTER — APPOINTMENT (OUTPATIENT)
Facility: HOSPITAL | Age: 73
End: 2023-09-21
Payer: MEDICARE

## 2023-09-26 ENCOUNTER — APPOINTMENT (OUTPATIENT)
Facility: HOSPITAL | Age: 73
End: 2023-09-26
Payer: MEDICARE

## 2023-09-28 ENCOUNTER — APPOINTMENT (OUTPATIENT)
Facility: HOSPITAL | Age: 73
End: 2023-09-28
Payer: MEDICARE

## 2023-10-04 ENCOUNTER — OFFICE VISIT (OUTPATIENT)
Facility: CLINIC | Age: 73
End: 2023-10-04
Payer: MEDICARE

## 2023-10-04 VITALS
OXYGEN SATURATION: 97 % | DIASTOLIC BLOOD PRESSURE: 86 MMHG | HEART RATE: 67 BPM | RESPIRATION RATE: 16 BRPM | HEIGHT: 71 IN | SYSTOLIC BLOOD PRESSURE: 140 MMHG | WEIGHT: 265 LBS | TEMPERATURE: 98 F | BODY MASS INDEX: 37.1 KG/M2

## 2023-10-04 DIAGNOSIS — E11.9 TYPE 2 DIABETES MELLITUS WITHOUT COMPLICATION, WITHOUT LONG-TERM CURRENT USE OF INSULIN (HCC): ICD-10-CM

## 2023-10-04 DIAGNOSIS — E11.65 TYPE 2 DIABETES MELLITUS WITH HYPERGLYCEMIA, WITHOUT LONG-TERM CURRENT USE OF INSULIN (HCC): ICD-10-CM

## 2023-10-04 DIAGNOSIS — M48.07 SPINAL STENOSIS OF LUMBOSACRAL REGION: ICD-10-CM

## 2023-10-04 DIAGNOSIS — E78.00 PURE HYPERCHOLESTEROLEMIA, UNSPECIFIED: ICD-10-CM

## 2023-10-04 DIAGNOSIS — M54.16 LUMBAR RADICULOPATHY: Primary | ICD-10-CM

## 2023-10-04 LAB
ALBUMIN SERPL-MCNC: 4.4 G/DL (ref 3.5–5)
ALBUMIN/GLOB SERPL: 1.3 (ref 1.1–2.2)
ALP SERPL-CCNC: 67 U/L (ref 45–117)
ALT SERPL-CCNC: 37 U/L (ref 12–78)
ANION GAP SERPL CALC-SCNC: 4 MMOL/L (ref 5–15)
AST SERPL-CCNC: 52 U/L (ref 15–37)
BILIRUB SERPL-MCNC: 0.7 MG/DL (ref 0.2–1)
BUN SERPL-MCNC: 17 MG/DL (ref 6–20)
BUN/CREAT SERPL: 17 (ref 12–20)
CALCIUM SERPL-MCNC: 9.1 MG/DL (ref 8.5–10.1)
CHLORIDE SERPL-SCNC: 105 MMOL/L (ref 97–108)
CHOLEST SERPL-MCNC: 176 MG/DL
CO2 SERPL-SCNC: 27 MMOL/L (ref 21–32)
CREAT SERPL-MCNC: 0.99 MG/DL (ref 0.7–1.3)
ERYTHROCYTE [DISTWIDTH] IN BLOOD BY AUTOMATED COUNT: 11.9 % (ref 11.5–14.5)
EST. AVERAGE GLUCOSE BLD GHB EST-MCNC: 197 MG/DL
GLOBULIN SER CALC-MCNC: 3.5 G/DL (ref 2–4)
GLUCOSE SERPL-MCNC: 138 MG/DL (ref 65–100)
HBA1C MFR BLD: 8.5 % (ref 4–5.6)
HCT VFR BLD AUTO: 38.5 % (ref 36.6–50.3)
HDLC SERPL-MCNC: 52 MG/DL
HDLC SERPL: 3.4 (ref 0–5)
HGB BLD-MCNC: 12.2 G/DL (ref 12.1–17)
LDLC SERPL CALC-MCNC: 111 MG/DL (ref 0–100)
MCH RBC QN AUTO: 31.3 PG (ref 26–34)
MCHC RBC AUTO-ENTMCNC: 31.7 G/DL (ref 30–36.5)
MCV RBC AUTO: 98.7 FL (ref 80–99)
NRBC # BLD: 0 K/UL (ref 0–0.01)
NRBC BLD-RTO: 0 PER 100 WBC
PLATELET # BLD AUTO: 281 K/UL (ref 150–400)
PMV BLD AUTO: 11.7 FL (ref 8.9–12.9)
POTASSIUM SERPL-SCNC: 4.5 MMOL/L (ref 3.5–5.1)
PROT SERPL-MCNC: 7.9 G/DL (ref 6.4–8.2)
RBC # BLD AUTO: 3.9 M/UL (ref 4.1–5.7)
SODIUM SERPL-SCNC: 136 MMOL/L (ref 136–145)
TRIGL SERPL-MCNC: 65 MG/DL
VLDLC SERPL CALC-MCNC: 13 MG/DL
WBC # BLD AUTO: 6.3 K/UL (ref 4.1–11.1)

## 2023-10-04 PROCEDURE — G8427 DOCREV CUR MEDS BY ELIG CLIN: HCPCS | Performed by: INTERNAL MEDICINE

## 2023-10-04 PROCEDURE — 3017F COLORECTAL CA SCREEN DOC REV: CPT | Performed by: INTERNAL MEDICINE

## 2023-10-04 PROCEDURE — G8484 FLU IMMUNIZE NO ADMIN: HCPCS | Performed by: INTERNAL MEDICINE

## 2023-10-04 PROCEDURE — G8417 CALC BMI ABV UP PARAM F/U: HCPCS | Performed by: INTERNAL MEDICINE

## 2023-10-04 PROCEDURE — 99214 OFFICE O/P EST MOD 30 MIN: CPT | Performed by: INTERNAL MEDICINE

## 2023-10-04 PROCEDURE — 3046F HEMOGLOBIN A1C LEVEL >9.0%: CPT | Performed by: INTERNAL MEDICINE

## 2023-10-04 PROCEDURE — 1036F TOBACCO NON-USER: CPT | Performed by: INTERNAL MEDICINE

## 2023-10-04 PROCEDURE — 2022F DILAT RTA XM EVC RTNOPTHY: CPT | Performed by: INTERNAL MEDICINE

## 2023-10-04 PROCEDURE — 1123F ACP DISCUSS/DSCN MKR DOCD: CPT | Performed by: INTERNAL MEDICINE

## 2023-10-04 NOTE — PROGRESS NOTES
Nettie Ty is a 67 y.o. male and presents with Diabetes, Cholesterol Problem, and Back Pain  . Subjective:    Back Pain Review:  Patient presents for evaluation of low back problems. Symptoms have been present for  weeks and include pain in lower back (dull, moderate in character;9 /10 in severity). Initial inciting event: unknown. Symptoms are worst: at times. Alleviating factors identifiable by patient are lying flat, medication . Exacerbating factors identifiable by patient are bending forwards, bending backwards. Treatments so far initiated by patient: medication Previous lower back problems: reported. Previous workup: mri:. Findings: There is 2 mm anterolisthesis of L4 on L5. Extensive multilevel  endplate degenerative change. Cord terminus is normal. Enhancement pattern is  normal. Paraspinous soft tissues are within normal limits. T12-L1: No stenosis  L1-L2: Facet arthropathy without stenosis  L2-L3: Desiccation of this disc. Small bulge with left paracentral extrusion  extending cranially. There are facet degenerative changes. Canal stenosis is  mild with narrowing of the left subarticular zone. Mild to moderate narrowing of  the foramen slightly worse on the right  L3-L4: Disc height loss with degeneration of this disc and diffuse bulge. There  are facet degenerative changes. Mild narrowing of the canal with narrowing of  the right greater than left subarticular zones. Mild foraminal narrowing  L4-L5: There is disc height loss with desiccation of this disc. Diffuse bulge. There are facet degenerative changes with mild narrowing of the canal. Narrowing  of the bilateral subarticular zones. Mild bilateral foraminal narrowing  L5-S1: Previous posterior decompression. There is disc height loss with  degeneration of this disc and a diffuse bulge. There are facet degenerative  changes.  Mild narrowing of the canal with narrowing of the bilateral  subarticular zones there is enhancing tissue surrounding the

## 2023-10-04 NOTE — PROGRESS NOTES
1. Have you been to the ER, urgent care clinic since your last visit? Hospitalized since your last visit? No    2. Have you seen or consulted any other health care providers outside of the [address] [place] [address] Avenue since your last visit? Include any pap smears or colon screening.  No     Chief Complaint   Patient presents with    Diabetes    Cholesterol Problem    Back Pain         No data recorded

## 2024-01-04 ENCOUNTER — OFFICE VISIT (OUTPATIENT)
Facility: CLINIC | Age: 74
End: 2024-01-04
Payer: MEDICARE

## 2024-01-04 VITALS
HEART RATE: 75 BPM | DIASTOLIC BLOOD PRESSURE: 80 MMHG | WEIGHT: 274 LBS | RESPIRATION RATE: 20 BRPM | SYSTOLIC BLOOD PRESSURE: 138 MMHG | TEMPERATURE: 98.2 F | BODY MASS INDEX: 38.36 KG/M2 | HEIGHT: 71 IN | OXYGEN SATURATION: 96 %

## 2024-01-04 DIAGNOSIS — I10 ESSENTIAL (PRIMARY) HYPERTENSION: ICD-10-CM

## 2024-01-04 DIAGNOSIS — E11.65 TYPE 2 DIABETES MELLITUS WITH HYPERGLYCEMIA, WITHOUT LONG-TERM CURRENT USE OF INSULIN (HCC): ICD-10-CM

## 2024-01-04 DIAGNOSIS — E66.01 SEVERE OBESITY (BMI 35.0-39.9) WITH COMORBIDITY (HCC): ICD-10-CM

## 2024-01-04 DIAGNOSIS — E78.00 PURE HYPERCHOLESTEROLEMIA, UNSPECIFIED: ICD-10-CM

## 2024-01-04 DIAGNOSIS — E11.9 TYPE 2 DIABETES MELLITUS WITHOUT COMPLICATION, WITHOUT LONG-TERM CURRENT USE OF INSULIN (HCC): Primary | ICD-10-CM

## 2024-01-04 LAB
CHOLESTEROL, POC: 197 MG/DL
GLUCOSE, POC: 176 MG/DL
HBA1C MFR BLD: 8.1 %
HDL CHOLESTEROL, POC: 59 MG/DL
LDL CHOLESTEROL, POC: 126 MG/DL
NON-HDL, POC: 138
TCHOL/HDL RATIO, POC: 3.3 MG/DL
TRIGLYCERIDES, POC: 57

## 2024-01-04 PROCEDURE — 80061 LIPID PANEL: CPT | Performed by: INTERNAL MEDICINE

## 2024-01-04 PROCEDURE — 82947 ASSAY GLUCOSE BLOOD QUANT: CPT | Performed by: INTERNAL MEDICINE

## 2024-01-04 PROCEDURE — G8428 CUR MEDS NOT DOCUMENT: HCPCS | Performed by: INTERNAL MEDICINE

## 2024-01-04 PROCEDURE — 90694 VACC AIIV4 NO PRSRV 0.5ML IM: CPT | Performed by: INTERNAL MEDICINE

## 2024-01-04 PROCEDURE — 1123F ACP DISCUSS/DSCN MKR DOCD: CPT | Performed by: INTERNAL MEDICINE

## 2024-01-04 PROCEDURE — 83036 HEMOGLOBIN GLYCOSYLATED A1C: CPT | Performed by: INTERNAL MEDICINE

## 2024-01-04 PROCEDURE — 3017F COLORECTAL CA SCREEN DOC REV: CPT | Performed by: INTERNAL MEDICINE

## 2024-01-04 PROCEDURE — 99214 OFFICE O/P EST MOD 30 MIN: CPT | Performed by: INTERNAL MEDICINE

## 2024-01-04 PROCEDURE — 3079F DIAST BP 80-89 MM HG: CPT | Performed by: INTERNAL MEDICINE

## 2024-01-04 PROCEDURE — 2022F DILAT RTA XM EVC RTNOPTHY: CPT | Performed by: INTERNAL MEDICINE

## 2024-01-04 PROCEDURE — G8484 FLU IMMUNIZE NO ADMIN: HCPCS | Performed by: INTERNAL MEDICINE

## 2024-01-04 PROCEDURE — 1036F TOBACCO NON-USER: CPT | Performed by: INTERNAL MEDICINE

## 2024-01-04 PROCEDURE — 3046F HEMOGLOBIN A1C LEVEL >9.0%: CPT | Performed by: INTERNAL MEDICINE

## 2024-01-04 PROCEDURE — G0008 ADMIN INFLUENZA VIRUS VAC: HCPCS | Performed by: INTERNAL MEDICINE

## 2024-01-04 PROCEDURE — G8417 CALC BMI ABV UP PARAM F/U: HCPCS | Performed by: INTERNAL MEDICINE

## 2024-01-04 PROCEDURE — 3075F SYST BP GE 130 - 139MM HG: CPT | Performed by: INTERNAL MEDICINE

## 2024-01-04 RX ORDER — PIOGLITAZONEHYDROCHLORIDE 30 MG/1
30 TABLET ORAL DAILY
Qty: 90 TABLET | Refills: 3 | Status: SHIPPED | OUTPATIENT
Start: 2024-01-04

## 2024-01-04 SDOH — ECONOMIC STABILITY: FOOD INSECURITY: WITHIN THE PAST 12 MONTHS, THE FOOD YOU BOUGHT JUST DIDN'T LAST AND YOU DIDN'T HAVE MONEY TO GET MORE.: NEVER TRUE

## 2024-01-04 SDOH — ECONOMIC STABILITY: INCOME INSECURITY: HOW HARD IS IT FOR YOU TO PAY FOR THE VERY BASICS LIKE FOOD, HOUSING, MEDICAL CARE, AND HEATING?: NOT VERY HARD

## 2024-01-04 SDOH — ECONOMIC STABILITY: FOOD INSECURITY: WITHIN THE PAST 12 MONTHS, YOU WORRIED THAT YOUR FOOD WOULD RUN OUT BEFORE YOU GOT MONEY TO BUY MORE.: NEVER TRUE

## 2024-01-04 ASSESSMENT — ANXIETY QUESTIONNAIRES
6. BECOMING EASILY ANNOYED OR IRRITABLE: 0
1. FEELING NERVOUS, ANXIOUS, OR ON EDGE: 0
5. BEING SO RESTLESS THAT IT IS HARD TO SIT STILL: 0
7. FEELING AFRAID AS IF SOMETHING AWFUL MIGHT HAPPEN: 0
GAD7 TOTAL SCORE: 0
4. TROUBLE RELAXING: 0
3. WORRYING TOO MUCH ABOUT DIFFERENT THINGS: 0
IF YOU CHECKED OFF ANY PROBLEMS ON THIS QUESTIONNAIRE, HOW DIFFICULT HAVE THESE PROBLEMS MADE IT FOR YOU TO DO YOUR WORK, TAKE CARE OF THINGS AT HOME, OR GET ALONG WITH OTHER PEOPLE: NOT DIFFICULT AT ALL
2. NOT BEING ABLE TO STOP OR CONTROL WORRYING: 0

## 2024-01-04 NOTE — PATIENT INSTRUCTIONS
Thank you for enrolling in [employer]. Please follow the instructions below to securely access your online medical record. [employer] allows you to send messages to your doctor, view your test results, renew your prescriptions, schedule appointments, and more.     How Do I Sign Up?  In your Internet browser, go to [email or web address]  Click on the Sign Up Now link in the Sign In box. You will see the New Member Sign Up page.  Enter your [employer] Access Code exactly as it appears below. You will not need to use this code after you’ve completed the sign-up process. If you do not sign up before the expiration date, you must request a new code.  [employer] Access Code: Activation code not generated  Current [employer] Status: Patient Declined    Enter your Social Security Number ([id number]) and Date of Birth (mm/dd/yyyy) as indicated and click Submit. You will be taken to the next sign-up page.  Create a [employer] ID. This will be your [employer] login ID and cannot be changed, so think of one that is secure and easy to remember.  Create a [employer] password. You can change your password at any time.  Enter your Password Reset Question and Answer. This can be used at a later time if you forget your password.   Enter your e-mail address. You will receive e-mail notification when new information is available in Our[employer].  Click Sign Up. You can now view your medical record.     Additional Information  If you have questions, please contact your physician practice where you receive care. Remember, [employer] is NOT to be used for urgent needs. For medical emergencies, dial 911.

## 2024-01-04 NOTE — PROGRESS NOTES
Lev Robert is a 73 y.o. male  who presents for routine immunizations.   He denies any symptoms , reactions or allergies that would exclude them from being immunized today.  Risks and adverse reactions were discussed and the VIS was given to them. All questions were addressed.  He was observed for 5 min post injection. There were no reactions observed.    Kaz Reynolds LPN 1. Have you been to the ER, urgent care clinic since your last visit?  Hospitalized since your last visit?no    2. Have you seen or consulted any other health care providers outside of the [facility] since your last visit?  Include any pap smears or colon screening. no     Chief Complaint   Patient presents with    Hypertension    Diabetes    Lower Back Pain         No data recorded  
significant adenopathy   Chest - clear to auscultation, no wheezes, rales or rhonchi, symmetric air entry   Heart - normal rate, regular rhythm, normal S1, S2, no murmurs, rubs, clicks or gallops   Abdomen - soft, nontender, nondistended, no masses or organomegaly  Lymph- no adenopathy palpable  Ext-peripheral pulses normal, no pedal edema, no clubbing or cyanosis  Skin-Warm and dry. no hyperpigmentation, vitiligo, or suspicious lesions  Neuro -alert, oriented, normal speech, no focal findings or movement disorder noted  Neck-normal C-spine, no tenderness, full ROM without pain  Feet-no nail deformities or callus formation with good pulses noted      No results found for this visit on 01/04/24.    Assessment/Plan:    ICD-10-CM    1. Type 2 diabetes mellitus without complication, without long-term current use of insulin ([facility])  E11.9 Microalbumin / Creatinine Urine Ratio     AMB POC HEMOGLOBIN A1C     AMB POC LIPID PROFILE + GLUCOSE      2. Pure hypercholesterolemia, unspecified  E78.00 AMB POC LIPID PROFILE + GLUCOSE     Lipid Panel      3. Essential (primary) hypertension  I10 CBC     Comprehensive Metabolic Panel      4. Severe obesity (BMI 35.0-39.9) with comorbidity ([facility])  E66.01       5. Type 2 diabetes mellitus with hyperglycemia, without long-term current use of insulin ([facility])  E11.65         Orders Placed This Encounter   Procedures    Influenza, FLUAD, (age 65 y+), IM, PF, 0.5 mL    CBC     Standing Status:   Future     Standing Expiration Date:   1/4/2025    Microalbumin / Creatinine Urine Ratio     Standing Status:   Future     Standing Expiration Date:   1/4/2025    Comprehensive Metabolic Panel     Standing Status:   Future     Standing Expiration Date:   1/4/2025    Lipid Panel     Standing Status:   Future     Standing Expiration Date:   1/4/2025    AMB POC HEMOGLOBIN A1C    AMB POC LIPID PROFILE + GLUCOSE     lose weight, increase physical activity, follow low fat diet, follow low salt diet, continue

## 2024-01-05 LAB
ALBUMIN SERPL-MCNC: 4.1 G/DL (ref 3.5–5)
ALBUMIN/GLOB SERPL: 1.2 (ref 1.1–2.2)
ALP SERPL-CCNC: 72 U/L (ref 45–117)
ALT SERPL-CCNC: 48 U/L (ref 12–78)
ANION GAP SERPL CALC-SCNC: 9 MMOL/L (ref 5–15)
AST SERPL-CCNC: 45 U/L (ref 15–37)
BILIRUB SERPL-MCNC: 0.7 MG/DL (ref 0.2–1)
BUN SERPL-MCNC: 20 MG/DL (ref 6–20)
BUN/CREAT SERPL: 22 (ref 12–20)
CALCIUM SERPL-MCNC: 9.2 MG/DL (ref 8.5–10.1)
CHLORIDE SERPL-SCNC: 107 MMOL/L (ref 97–108)
CHOLEST SERPL-MCNC: 186 MG/DL
CO2 SERPL-SCNC: 25 MMOL/L (ref 21–32)
CREAT SERPL-MCNC: 0.9 MG/DL (ref 0.7–1.3)
CREAT UR-MCNC: 248 MG/DL
ERYTHROCYTE [DISTWIDTH] IN BLOOD BY AUTOMATED COUNT: 12.1 % (ref 11.5–14.5)
GLOBULIN SER CALC-MCNC: 3.5 G/DL (ref 2–4)
GLUCOSE SERPL-MCNC: 196 MG/DL (ref 65–100)
HCT VFR BLD AUTO: 38.7 % (ref 36.6–50.3)
HDLC SERPL-MCNC: 60 MG/DL
HDLC SERPL: 3.1 (ref 0–5)
HGB BLD-MCNC: 12.9 G/DL (ref 12.1–17)
LDLC SERPL CALC-MCNC: 113.6 MG/DL (ref 0–100)
MCH RBC QN AUTO: 31.3 PG (ref 26–34)
MCHC RBC AUTO-ENTMCNC: 33.3 G/DL (ref 30–36.5)
MCV RBC AUTO: 93.9 FL (ref 80–99)
MICROALBUMIN UR-MCNC: 4.28 MG/DL
MICROALBUMIN/CREAT UR-RTO: 17 MG/G (ref 0–30)
NRBC # BLD: 0 K/UL (ref 0–0.01)
NRBC BLD-RTO: 0 PER 100 WBC
PLATELET # BLD AUTO: 202 K/UL (ref 150–400)
PMV BLD AUTO: 11.9 FL (ref 8.9–12.9)
POTASSIUM SERPL-SCNC: 4.5 MMOL/L (ref 3.5–5.1)
PROT SERPL-MCNC: 7.6 G/DL (ref 6.4–8.2)
RBC # BLD AUTO: 4.12 M/UL (ref 4.1–5.7)
SODIUM SERPL-SCNC: 141 MMOL/L (ref 136–145)
TRIGL SERPL-MCNC: 62 MG/DL
VLDLC SERPL CALC-MCNC: 12.4 MG/DL
WBC # BLD AUTO: 5.8 K/UL (ref 4.1–11.1)

## 2024-02-06 ENCOUNTER — ENROLLMENT (OUTPATIENT)
Dept: PHARMACY | Facility: CLINIC | Age: 74
End: 2024-02-06

## 2024-03-28 ENCOUNTER — CLINICAL DOCUMENTATION (OUTPATIENT)
Facility: HOSPITAL | Age: 74
End: 2024-03-28

## 2024-03-28 ENCOUNTER — HOSPITAL ENCOUNTER (OUTPATIENT)
Facility: HOSPITAL | Age: 74
End: 2024-03-28

## 2024-03-28 VITALS
WEIGHT: 273 LBS | RESPIRATION RATE: 16 BRPM | HEART RATE: 58 BPM | HEIGHT: 71 IN | SYSTOLIC BLOOD PRESSURE: 136 MMHG | BODY MASS INDEX: 38.22 KG/M2 | OXYGEN SATURATION: 99 % | DIASTOLIC BLOOD PRESSURE: 84 MMHG

## 2024-03-28 DIAGNOSIS — C61 MALIGNANT NEOPLASM OF PROSTATE (HCC): Primary | ICD-10-CM

## 2024-03-28 DIAGNOSIS — R93.89 ABNORMAL FINDING OF DIAGNOSTIC IMAGING: ICD-10-CM

## 2024-03-28 ASSESSMENT — PAIN SCALES - GENERAL: PAINLEVEL_OUTOF10: 0

## 2024-03-28 NOTE — PROGRESS NOTES
Social History Narrative    Not on file     Social Determinants of Health     Financial Resource Strain: Low Risk  (1/4/2024)    Overall Financial Resource Strain (CARDIA)     Difficulty of Paying Living Expenses: Not very hard   Food Insecurity: No Food Insecurity (1/4/2024)    Hunger Vital Sign     Worried About Running Out of Food in the Last Year: Never true     Ran Out of Food in the Last Year: Never true   Transportation Needs: Unknown (1/4/2024)    PRAPARE - Transportation     Lack of Transportation (Medical): Not on file     Lack of Transportation (Non-Medical): No   Physical Activity: Not on file   Stress: Not on file   Social Connections: Not on file   Intimate Partner Violence: Not on file   Housing Stability: Unknown (1/4/2024)    Housing Stability Vital Sign     Unable to Pay for Housing in the Last Year: Not on file     Number of Places Lived in the Last Year: Not on file     Unstable Housing in the Last Year: No       ALLERGIES / MEDICATIONS:   No Known Allergies  Current Outpatient Medications   Medication Sig Dispense Refill    SITagliptin (JANUVIA) 100 MG tablet Take 1 tablet by mouth daily 90 tablet 3    pioglitazone (ACTOS) 30 MG tablet Take 1 tablet by mouth daily 90 tablet 3    diclofenac (VOLTAREN) 75 MG EC tablet Take 1 tablet by mouth 2 times daily 180 tablet 3    glipiZIDE (GLUCOTROL) 5 MG tablet TAKE  One   TABLET BY MOUTH TWICE DAILY, twenty min  Before b-fast and dinner  STOP 10 mg dose 60 tablet 12    atorvastatin (LIPITOR) 40 MG tablet Take 1 tablet by mouth daily (Patient not taking: Reported on 3/28/2024)      metFORMIN (GLUCOPHAGE) 1000 MG tablet TAKE HALF TABLET BY MOUTH TWICE DAILY WITH  A  MEAL (Patient not taking: Reported on 6/30/2023)       Current Facility-Administered Medications   Medication Dose Route Frequency Provider Last Rate Last Admin    methylPREDNISolone sodium succ (SOLU-MEDROL) injection 40 mg  40 mg IntraMUSCular Daily Lev Chang Jr., MD   40 mg at

## 2024-03-29 ENCOUNTER — CLINICAL DOCUMENTATION (OUTPATIENT)
Facility: HOSPITAL | Age: 74
End: 2024-03-29

## 2024-03-29 NOTE — PROGRESS NOTES
NCCN Distress Thermometer    Date Screening Completed: 3/28/24    Screening Declined:  [] Yes    Number that best describes how much distress you've experienced in the past week, including today?  0 [] - No distress 1 []      2 []      3 [x]      4 []       5 []       6 []      7 []      8 []      9 []       10 [] - Extreme distress    PROBLEM LIST  Have you had concerns about any of the items below in the past week, including today?      Physical Concerns Practical Concerns   [x] Pain [] Taking care of myself    [] Sleep [] Taking care of others    [x] Fatigue [] Work   [] Tobacco use  [] School   [] Substance use  [] Housing   [x] Memory or concentration [] Finances   [x] Sexual health [] Insurance   [] Changes in eating  [] Transportation   [x] Loss or change of physical abilities  []     [] Having enough food   Emotional Concerns [] Access to medicine   [] Worry or anxiety [x] Treatment decisions   [] Sadness or depression    [] Loss of interest or enjoyment  Spiritual or Christian Concerns   [x] Grief or loss  [] Sense of meaning or purpose   [] Fear [] Changes in benito or beliefs   [] Loneliness  [] Death, dying, or afterlife   [] Anger [] Conflict between beliefs and cancer treatments    [] Changes in appearance [] Relationship with the sacred   [] Feelings of worthlessness or being a burden [] Ritual or dietary needs        Social Concerns     [] Relationship with spouse or partner     [x] Relationship with children    [] Relationship with family members     [] Relationship with friends or coworkers     [] Communication with health care team     [] Ability to have children     [] Prejudice or discrimination        Other Concerns:     Patient received resource information and education:  [x] Yes  [] No    Referral/Handouts: Complementary therapies referral  Support Groups referral

## 2024-03-29 NOTE — PROGRESS NOTES
[facility]  Social Work Encounter    Reason for Assessment:      [x] Initial Assessment [] Social Work Referral [] Social Work Follow-up []Initiated   [] Other:    Sources of Information:    [x]Patient  []Family  [x]Staff  [x]Medical Record    Mental Status:    [x]Alert  []Lethargic  []Unresponsive   [] Unable to assess   Oriented to:  [x]Person  [x]Place  [x]Time  [x]Situation      Relationship Status:  [x]Single  []  []Significant Other/Life Partner  []  []  []    Living Circumstances:  [x]Lives Alone  []Family/Significant Other in Household  []Roommates  []Children in the Home  []Paid Caregivers  []Assisted Living Facility/Group Home  []Skilled Nursing Facility  []Homeless  []Incarcerated  []Environmental/Care Concerns  []Other:    Employment Status:  []Employed Full-time []Employed Part-time []Homemaker  [x] Retired [] Short-Term Disability [] Long-Term Disability  [] Unemployed   [] SSI   [] SSDI  [] Self-Employment    Barriers to Learning:    []Language  []Developmental  []Cognitive  []Altered Mental Status  []Visual/Hearing Impairment  []Unable to Read/Write  []Motivational  [] Challenges Understanding Medical Jargon [x]No Barriers Identified      Financial/Legal Concerns:    []Uninsured  []Limited Income/Resources  []Non-Citizen  []Food Insecurity [x]No Concerns Identified   []Other:    Anglican/Spiritual/Existential:  Does patient have any spiritual or Jain beliefs? [] Yes [] No  Is the patient involved in a spiritual, benito or Jain community? [] Yes [] No  Patient expressing spiritual/existential angst? [] Yes [] No  Notes:    Support System:    Identified Support Person/Group: brother and friends  [x]Strong  []Fair  []Limited    Coping with Illness:   [x]  Coping Well  [] Challenges Coping with Serious Illness [] Situational Depression [] Situational Anxiety [] Anticipatory Grief  [] Recent Loss [] Caregiver [place]            Narrative: Met

## 2024-04-04 ENCOUNTER — OFFICE VISIT (OUTPATIENT)
Facility: CLINIC | Age: 74
End: 2024-04-04
Payer: MEDICARE

## 2024-04-04 VITALS
RESPIRATION RATE: 20 BRPM | BODY MASS INDEX: 38.58 KG/M2 | TEMPERATURE: 98 F | OXYGEN SATURATION: 99 % | HEIGHT: 71 IN | SYSTOLIC BLOOD PRESSURE: 130 MMHG | HEART RATE: 61 BPM | DIASTOLIC BLOOD PRESSURE: 70 MMHG | WEIGHT: 275.57 LBS

## 2024-04-04 DIAGNOSIS — M48.07 SPINAL STENOSIS OF LUMBOSACRAL REGION: ICD-10-CM

## 2024-04-04 DIAGNOSIS — E11.9 TYPE 2 DIABETES MELLITUS WITHOUT COMPLICATION, WITHOUT LONG-TERM CURRENT USE OF INSULIN (HCC): Primary | ICD-10-CM

## 2024-04-04 DIAGNOSIS — E78.00 PURE HYPERCHOLESTEROLEMIA, UNSPECIFIED: ICD-10-CM

## 2024-04-04 DIAGNOSIS — C61 PROSTATE CANCER (HCC): ICD-10-CM

## 2024-04-04 DIAGNOSIS — E11.49 TYPE 2 DIABETES MELLITUS WITH OTHER DIABETIC NEUROLOGICAL COMPLICATION (HCC): ICD-10-CM

## 2024-04-04 LAB
ALBUMIN SERPL-MCNC: 4.2 G/DL (ref 3.5–5)
ALBUMIN/GLOB SERPL: 1.2 (ref 1.1–2.2)
ALP SERPL-CCNC: 68 U/L (ref 45–117)
ALT SERPL-CCNC: 33 U/L (ref 12–78)
ANION GAP SERPL CALC-SCNC: 3 MMOL/L (ref 5–15)
AST SERPL-CCNC: 40 U/L (ref 15–37)
BILIRUB SERPL-MCNC: 0.6 MG/DL (ref 0.2–1)
BUN SERPL-MCNC: 14 MG/DL (ref 6–20)
BUN/CREAT SERPL: 13 (ref 12–20)
CALCIUM SERPL-MCNC: 9.7 MG/DL (ref 8.5–10.1)
CHLORIDE SERPL-SCNC: 109 MMOL/L (ref 97–108)
CO2 SERPL-SCNC: 26 MMOL/L (ref 21–32)
CREAT SERPL-MCNC: 1.04 MG/DL (ref 0.7–1.3)
ERYTHROCYTE [DISTWIDTH] IN BLOOD BY AUTOMATED COUNT: 12.2 % (ref 11.5–14.5)
GLOBULIN SER CALC-MCNC: 3.6 G/DL (ref 2–4)
GLUCOSE SERPL-MCNC: 145 MG/DL (ref 65–100)
GLUCOSE, POC: 144 MG/DL
HCT VFR BLD AUTO: 38.4 % (ref 36.6–50.3)
HGB BLD-MCNC: 12.5 G/DL (ref 12.1–17)
MCH RBC QN AUTO: 31.4 PG (ref 26–34)
MCHC RBC AUTO-ENTMCNC: 32.6 G/DL (ref 30–36.5)
MCV RBC AUTO: 96.5 FL (ref 80–99)
NRBC # BLD: 0 K/UL (ref 0–0.01)
NRBC BLD-RTO: 0 PER 100 WBC
PLATELET # BLD AUTO: 223 K/UL (ref 150–400)
PMV BLD AUTO: 11.7 FL (ref 8.9–12.9)
POTASSIUM SERPL-SCNC: 4.6 MMOL/L (ref 3.5–5.1)
PROT SERPL-MCNC: 7.8 G/DL (ref 6.4–8.2)
RBC # BLD AUTO: 3.98 M/UL (ref 4.1–5.7)
SODIUM SERPL-SCNC: 138 MMOL/L (ref 136–145)
WBC # BLD AUTO: 5.4 K/UL (ref 4.1–11.1)

## 2024-04-04 PROCEDURE — 1123F ACP DISCUSS/DSCN MKR DOCD: CPT | Performed by: INTERNAL MEDICINE

## 2024-04-04 PROCEDURE — G8417 CALC BMI ABV UP PARAM F/U: HCPCS | Performed by: INTERNAL MEDICINE

## 2024-04-04 PROCEDURE — 99214 OFFICE O/P EST MOD 30 MIN: CPT | Performed by: INTERNAL MEDICINE

## 2024-04-04 PROCEDURE — 3046F HEMOGLOBIN A1C LEVEL >9.0%: CPT | Performed by: INTERNAL MEDICINE

## 2024-04-04 PROCEDURE — G8428 CUR MEDS NOT DOCUMENT: HCPCS | Performed by: INTERNAL MEDICINE

## 2024-04-04 PROCEDURE — 2022F DILAT RTA XM EVC RTNOPTHY: CPT | Performed by: INTERNAL MEDICINE

## 2024-04-04 PROCEDURE — 1036F TOBACCO NON-USER: CPT | Performed by: INTERNAL MEDICINE

## 2024-04-04 PROCEDURE — 3017F COLORECTAL CA SCREEN DOC REV: CPT | Performed by: INTERNAL MEDICINE

## 2024-04-04 PROCEDURE — 82962 GLUCOSE BLOOD TEST: CPT | Performed by: INTERNAL MEDICINE

## 2024-04-04 RX ORDER — ATORVASTATIN CALCIUM 40 MG/1
40 TABLET, FILM COATED ORAL DAILY
Qty: 90 TABLET | Refills: 3 | Status: SHIPPED | OUTPATIENT
Start: 2024-04-04

## 2024-04-04 SDOH — ECONOMIC STABILITY: INCOME INSECURITY: HOW HARD IS IT FOR YOU TO PAY FOR THE VERY BASICS LIKE FOOD, HOUSING, MEDICAL CARE, AND HEATING?: NOT VERY HARD

## 2024-04-04 SDOH — ECONOMIC STABILITY: FOOD INSECURITY: WITHIN THE PAST 12 MONTHS, THE FOOD YOU BOUGHT JUST DIDN'T LAST AND YOU DIDN'T HAVE MONEY TO GET MORE.: NEVER TRUE

## 2024-04-04 SDOH — ECONOMIC STABILITY: FOOD INSECURITY: WITHIN THE PAST 12 MONTHS, YOU WORRIED THAT YOUR FOOD WOULD RUN OUT BEFORE YOU GOT MONEY TO BUY MORE.: NEVER TRUE

## 2024-04-04 ASSESSMENT — PATIENT HEALTH QUESTIONNAIRE - PHQ9
SUM OF ALL RESPONSES TO PHQ QUESTIONS 1-9: 0
2. FEELING DOWN, DEPRESSED OR HOPELESS: NOT AT ALL
SUM OF ALL RESPONSES TO PHQ QUESTIONS 1-9: 0
1. LITTLE INTEREST OR PLEASURE IN DOING THINGS: NOT AT ALL
SUM OF ALL RESPONSES TO PHQ QUESTIONS 1-9: 0
SUM OF ALL RESPONSES TO PHQ9 QUESTIONS 1 & 2: 0
SUM OF ALL RESPONSES TO PHQ QUESTIONS 1-9: 0

## 2024-04-04 ASSESSMENT — ANXIETY QUESTIONNAIRES
5. BEING SO RESTLESS THAT IT IS HARD TO SIT STILL: NOT AT ALL
GAD7 TOTAL SCORE: 0
6. BECOMING EASILY ANNOYED OR IRRITABLE: NOT AT ALL
4. TROUBLE RELAXING: NOT AT ALL
2. NOT BEING ABLE TO STOP OR CONTROL WORRYING: NOT AT ALL
1. FEELING NERVOUS, ANXIOUS, OR ON EDGE: NOT AT ALL
3. WORRYING TOO MUCH ABOUT DIFFERENT THINGS: NOT AT ALL
7. FEELING AFRAID AS IF SOMETHING AWFUL MIGHT HAPPEN: NOT AT ALL

## 2024-04-04 NOTE — PROGRESS NOTES
Lev Robert is a 73 y.o. male and presents with Diabetes and Cholesterol Problem  .  Subjective:  Diabetes Mellitus Review:  He has diabetes mellitus.  Diabetic ROS - medication compliance: compliant all of the time, diabetic diet compliance: compliant all of the time, home glucose monitoring: is performed.   Known diabetic complications: none  Cardiovascular risk factors: family history, dyslipidemia, diabetes mellitus, obesity, hypertension  Current diabetic medications include oral agents  Eye exam current (within one year): no  Weight trend: stable  Prior visit with dietician: no  Current diet: \"healthy\" diet  in general  Current exercise: walking  Current monitoring regimen: home blood tests - daily  Home blood sugar records: trend: stable  Any episodes of hypoglycemia? no  Is He on ACE inhibitor or angiotensin II receptor blocker?   Yes     Dyslipidemia Review:  Patient presents for evaluation of lipids.  Compliance with treatment thus far has been excellent.  A repeat fasting lipid profile was not done.  The patient does not use medications that may worsen dyslipidemias (corticosteroids, progestins, anabolic steroids, amiodarone, cyclosporine, olanzapine). The patient exercises some      He states his last psa levels were elevated  He has seen oncology and he states they are evaluating his current progress    Review of Systems  Constitutional: negative for fevers, chills, anorexia and weight loss  Eyes:   negative for visual disturbance and irritation  ENT:   negative for tinnitus,sore throat,nasal congestion,ear pains.hoarseness  Respiratory:  negative for cough, hemoptysis, dyspnea,wheezing  CV:   negative for chest pain, palpitations, lower extremity edema  GI:   negative for nausea, vomiting, diarrhea, abdominal pain,melena  Endo:               negative for polyuria,polydipsia,polyphagia,heat intolerance  Genitourinary: negative for frequency, dysuria and hematuria  Integument:  negative for rash and

## 2024-04-04 NOTE — PATIENT INSTRUCTIONS
Thank you for enrolling in [employer]. Please follow the instructions below to securely access your online medical record. [employer] allows you to send messages to your doctor, view your test results, renew your prescriptions, schedule appointments, and more.     How Do I Sign Up?  In your Internet browser, go to [email or web address]  Click on the Sign Up Now link in the Sign In box. You will see the New Member Sign Up page.  Enter your [employer] Access Code exactly as it appears below. You will not need to use this code after you’ve completed the sign-up process. If you do not sign up before the expiration date, you must request a new code.  [employer] Access Code: [email or web address]  Expires: 5/19/2024  8:19 AM    Enter your Social Security Number ([id number]) and Date of Birth (mm/dd/yyyy) as indicated and click Submit. You will be taken to the next sign-up page.  Create a [employer]Movies ID. This will be your [employer]Mov[employer] login ID and cannot be changed, so think of one that is secure and easy to remember.  Create a [employer] password. You can change your password at any time.  Enter your Password Reset Question and Answer. This can be used at a later time if you forget your password.   Enter your e-mail address. You will receive e-mail notification when new information is available in [employer].  Click Sign Up. You can now view your medical record.     Additional Information  If you have questions, please contact your physician practice where you receive care. Remember, [employer] is NOT to be used for urgent needs. For medical emergencies, dial 911.

## 2024-04-09 ENCOUNTER — HOSPITAL ENCOUNTER (OUTPATIENT)
Facility: HOSPITAL | Age: 74
Discharge: HOME | End: 2024-04-12
Payer: MEDICARE

## 2024-04-09 DIAGNOSIS — C61 MALIGNANT NEOPLASM OF PROSTATE (HCC): ICD-10-CM

## 2024-04-09 DIAGNOSIS — R93.89 ABNORMAL FINDING OF DIAGNOSTIC IMAGING: ICD-10-CM

## 2024-04-09 PROCEDURE — 71250 CT THORAX DX C-: CPT

## 2024-04-12 ENCOUNTER — TELEPHONE (OUTPATIENT)
Facility: HOSPITAL | Age: 74
End: 2024-04-12

## 2024-04-12 NOTE — TELEPHONE ENCOUNTER
Called patient with result of recent CT chest from yesterday.  Results show a few very small pulmonary nodules none of which showing aggressive features or look concerning.  I do not think any of this is related to his prostate cancer.  No further workup needed at this time.    Will plan to repeat CT chest in 6 months to document stability of these nodules.  Patient happy to hear this result.

## 2024-06-20 RX ORDER — GLIPIZIDE 5 MG/1
TABLET ORAL
Qty: 60 TABLET | Refills: 0 | Status: SHIPPED | OUTPATIENT
Start: 2024-06-20

## 2024-07-01 DIAGNOSIS — R91.1 LUNG NODULE: ICD-10-CM

## 2024-07-01 DIAGNOSIS — C61 MALIGNANT NEOPLASM OF PROSTATE (HCC): Primary | ICD-10-CM

## 2024-07-11 ENCOUNTER — OFFICE VISIT (OUTPATIENT)
Facility: CLINIC | Age: 74
End: 2024-07-11
Payer: MEDICARE

## 2024-07-11 VITALS
WEIGHT: 273 LBS | TEMPERATURE: 98 F | DIASTOLIC BLOOD PRESSURE: 82 MMHG | HEART RATE: 92 BPM | RESPIRATION RATE: 16 BRPM | SYSTOLIC BLOOD PRESSURE: 130 MMHG | HEIGHT: 71 IN | OXYGEN SATURATION: 97 % | BODY MASS INDEX: 38.22 KG/M2

## 2024-07-11 DIAGNOSIS — E78.00 PURE HYPERCHOLESTEROLEMIA, UNSPECIFIED: ICD-10-CM

## 2024-07-11 DIAGNOSIS — E11.9 TYPE 2 DIABETES MELLITUS WITHOUT COMPLICATION, WITHOUT LONG-TERM CURRENT USE OF INSULIN (HCC): Primary | ICD-10-CM

## 2024-07-11 DIAGNOSIS — M48.07 SPINAL STENOSIS OF LUMBOSACRAL REGION: ICD-10-CM

## 2024-07-11 DIAGNOSIS — Z00.00 MEDICARE ANNUAL WELLNESS VISIT, SUBSEQUENT: ICD-10-CM

## 2024-07-11 LAB
ALBUMIN SERPL-MCNC: 4.3 G/DL (ref 3.5–5)
ALBUMIN/GLOB SERPL: 1.2 (ref 1.1–2.2)
ALP SERPL-CCNC: 58 U/L (ref 45–117)
ALT SERPL-CCNC: 37 U/L (ref 12–78)
ANION GAP SERPL CALC-SCNC: 6 MMOL/L (ref 5–15)
AST SERPL-CCNC: 51 U/L (ref 15–37)
BILIRUB SERPL-MCNC: 0.6 MG/DL (ref 0.2–1)
BUN SERPL-MCNC: 18 MG/DL (ref 6–20)
BUN/CREAT SERPL: 15 (ref 12–20)
CALCIUM SERPL-MCNC: 9.1 MG/DL (ref 8.5–10.1)
CHLORIDE SERPL-SCNC: 110 MMOL/L (ref 97–108)
CHOLEST SERPL-MCNC: 190 MG/DL
CO2 SERPL-SCNC: 25 MMOL/L (ref 21–32)
CREAT SERPL-MCNC: 1.2 MG/DL (ref 0.7–1.3)
ERYTHROCYTE [DISTWIDTH] IN BLOOD BY AUTOMATED COUNT: 12.2 % (ref 11.5–14.5)
EST. AVERAGE GLUCOSE BLD GHB EST-MCNC: 128 MG/DL
GLOBULIN SER CALC-MCNC: 3.5 G/DL (ref 2–4)
GLUCOSE SERPL-MCNC: 97 MG/DL (ref 65–100)
HBA1C MFR BLD: 6.1 % (ref 4–5.6)
HCT VFR BLD AUTO: 35.3 % (ref 36.6–50.3)
HDLC SERPL-MCNC: 57 MG/DL
HDLC SERPL: 3.3 (ref 0–5)
HGB BLD-MCNC: 11.7 G/DL (ref 12.1–17)
LDLC SERPL CALC-MCNC: 119.2 MG/DL (ref 0–100)
MCH RBC QN AUTO: 31.9 PG (ref 26–34)
MCHC RBC AUTO-ENTMCNC: 33.1 G/DL (ref 30–36.5)
MCV RBC AUTO: 96.2 FL (ref 80–99)
NRBC # BLD: 0 K/UL (ref 0–0.01)
NRBC BLD-RTO: 0 PER 100 WBC
PLATELET # BLD AUTO: 180 K/UL (ref 150–400)
PMV BLD AUTO: 11.8 FL (ref 8.9–12.9)
POTASSIUM SERPL-SCNC: 4.4 MMOL/L (ref 3.5–5.1)
PROT SERPL-MCNC: 7.8 G/DL (ref 6.4–8.2)
RBC # BLD AUTO: 3.67 M/UL (ref 4.1–5.7)
SODIUM SERPL-SCNC: 141 MMOL/L (ref 136–145)
TRIGL SERPL-MCNC: 69 MG/DL
VLDLC SERPL CALC-MCNC: 13.8 MG/DL
WBC # BLD AUTO: 5.2 K/UL (ref 4.1–11.1)

## 2024-07-11 PROCEDURE — 1123F ACP DISCUSS/DSCN MKR DOCD: CPT | Performed by: INTERNAL MEDICINE

## 2024-07-11 PROCEDURE — 3046F HEMOGLOBIN A1C LEVEL >9.0%: CPT | Performed by: INTERNAL MEDICINE

## 2024-07-11 PROCEDURE — 1036F TOBACCO NON-USER: CPT | Performed by: INTERNAL MEDICINE

## 2024-07-11 PROCEDURE — G8417 CALC BMI ABV UP PARAM F/U: HCPCS | Performed by: INTERNAL MEDICINE

## 2024-07-11 PROCEDURE — G8427 DOCREV CUR MEDS BY ELIG CLIN: HCPCS | Performed by: INTERNAL MEDICINE

## 2024-07-11 PROCEDURE — 99214 OFFICE O/P EST MOD 30 MIN: CPT | Performed by: INTERNAL MEDICINE

## 2024-07-11 PROCEDURE — G0439 PPPS, SUBSEQ VISIT: HCPCS | Performed by: INTERNAL MEDICINE

## 2024-07-11 PROCEDURE — 2022F DILAT RTA XM EVC RTNOPTHY: CPT | Performed by: INTERNAL MEDICINE

## 2024-07-11 PROCEDURE — 3017F COLORECTAL CA SCREEN DOC REV: CPT | Performed by: INTERNAL MEDICINE

## 2024-07-11 SDOH — ECONOMIC STABILITY: FOOD INSECURITY: WITHIN THE PAST 12 MONTHS, THE FOOD YOU BOUGHT JUST DIDN'T LAST AND YOU DIDN'T HAVE MONEY TO GET MORE.: NEVER TRUE

## 2024-07-11 SDOH — ECONOMIC STABILITY: FOOD INSECURITY: WITHIN THE PAST 12 MONTHS, YOU WORRIED THAT YOUR FOOD WOULD RUN OUT BEFORE YOU GOT MONEY TO BUY MORE.: NEVER TRUE

## 2024-07-11 SDOH — ECONOMIC STABILITY: INCOME INSECURITY: HOW HARD IS IT FOR YOU TO PAY FOR THE VERY BASICS LIKE FOOD, HOUSING, MEDICAL CARE, AND HEATING?: NOT HARD AT ALL

## 2024-07-11 ASSESSMENT — PATIENT HEALTH QUESTIONNAIRE - PHQ9
1. LITTLE INTEREST OR PLEASURE IN DOING THINGS: NOT AT ALL
SUM OF ALL RESPONSES TO PHQ QUESTIONS 1-9: 0
2. FEELING DOWN, DEPRESSED OR HOPELESS: NOT AT ALL
SUM OF ALL RESPONSES TO PHQ QUESTIONS 1-9: 0
SUM OF ALL RESPONSES TO PHQ QUESTIONS 1-9: 0
SUM OF ALL RESPONSES TO PHQ9 QUESTIONS 1 & 2: 0
SUM OF ALL RESPONSES TO PHQ QUESTIONS 1-9: 0

## 2024-07-11 ASSESSMENT — LIFESTYLE VARIABLES
HOW OFTEN DO YOU HAVE A DRINK CONTAINING ALCOHOL: NEVER
HOW MANY STANDARD DRINKS CONTAINING ALCOHOL DO YOU HAVE ON A TYPICAL DAY: PATIENT DOES NOT DRINK

## 2024-07-11 NOTE — PATIENT INSTRUCTIONS
Lev Jakob - 7/11/2024  Medicare offers a range of preventive health benefits. Some of the tests and screenings are paid in full while other may be subject to a deductible, co-insurance, and/or copay.    Some of these benefits include a comprehensive review of your medical history including lifestyle, illnesses that may run in your family, and various assessments and screenings as appropriate.    After reviewing your medical record and screening and assessments performed today your provider may have ordered immunizations, labs, imaging, and/or referrals for you.  A list of these orders (if applicable) as well as your Preventive Care list are included within your After Visit Summary for your review.    Other Preventive Recommendations:    A preventive eye exam performed by an eye specialist is recommended every 1-2 years to screen for glaucoma; cataracts, macular degeneration, and other eye disorders.  A preventive dental visit is recommended every 6 months.  Try to get at least 150 minutes of exercise per week or 10,000 steps per day on a pedometer .  Order or download the FREE \"Exercise & Physical Activity: Your Everyday Guide\" from [employer]. Call 1-523.221.7950 or search [employer] online.  You need 9589-2803 mg of calcium and 0856-9627 IU of vitamin D per day. It is possible to meet your calcium requirement with diet alone, but a vitamin D supplement is usually necessary to meet this goal.  When exposed to the sun, use a sunscreen that protects against both UVA and UVB radiation with an SPF of 30 or greater. Reapply every 2 to 3 hours or after sweating, drying off with a towel, or swimming.  Always wear a seat belt when traveling in a car. Always wear a helmet when riding a bicycle or motorcycle.

## 2024-08-15 RX ORDER — DICLOFENAC SODIUM 75 MG/1
75 TABLET, DELAYED RELEASE ORAL 2 TIMES DAILY
Qty: 180 TABLET | Refills: 0 | Status: SHIPPED | OUTPATIENT
Start: 2024-08-15

## 2024-08-27 RX ORDER — GLIPIZIDE 5 MG/1
TABLET ORAL
Qty: 180 TABLET | Refills: 0 | Status: SHIPPED | OUTPATIENT
Start: 2024-08-27

## 2024-08-27 NOTE — TELEPHONE ENCOUNTER
Pt left a voice message requesting a refill.     [phone]    Last appointment: 07/11/2024 MD Chang   Next appointment: 10/10/2024 MD Chang   Previous refill encounter(s):   05/20/2024 Glucotrol #60     For Pharmacy Admin Tracking Only    Program: Medication Refill  Intervention Detail: New Rx: 1, reason: Patient Preference  Time Spent (min): 5  Requested Prescriptions     Pending Prescriptions Disp Refills    glipiZIDE (GLUCOTROL) 5 MG tablet [Pharmacy Med Name: glipiZIDE 5 MG Oral Tablet] 180 tablet 0     Sig: TAKE 1 TABLET BY MOUTH TWICE DAILY (TAKE  20  MINS  BEFORE  BREAKFAST  AND  DINNER)  STOP  10MG  DOSE

## 2024-09-27 ENCOUNTER — HOSPITAL ENCOUNTER (OUTPATIENT)
Facility: HOSPITAL | Age: 74
Discharge: HOME | End: 2024-09-30
Payer: MEDICARE

## 2024-09-27 DIAGNOSIS — R91.1 LUNG NODULE: ICD-10-CM

## 2024-09-27 DIAGNOSIS — C61 MALIGNANT NEOPLASM OF PROSTATE (HCC): ICD-10-CM

## 2024-09-27 PROCEDURE — 71250 CT THORAX DX C-: CPT

## 2024-10-01 ENCOUNTER — HOSPITAL ENCOUNTER (OUTPATIENT)
Facility: HOSPITAL | Age: 74
Discharge: HOME | End: 2024-10-04

## 2024-10-01 VITALS
RESPIRATION RATE: 16 BRPM | SYSTOLIC BLOOD PRESSURE: 157 MMHG | OXYGEN SATURATION: 98 % | HEART RATE: 54 BPM | HEIGHT: 71 IN | DIASTOLIC BLOOD PRESSURE: 87 MMHG | WEIGHT: 274 LBS | BODY MASS INDEX: 38.36 KG/M2

## 2024-10-01 DIAGNOSIS — C61 MALIGNANT NEOPLASM OF PROSTATE (HCC): ICD-10-CM

## 2024-10-01 DIAGNOSIS — C61 MALIGNANT NEOPLASM OF PROSTATE (HCC): Primary | ICD-10-CM

## 2024-10-01 ASSESSMENT — PAIN SCALES - GENERAL: PAINLEVEL_OUTOF10: 0

## 2024-10-01 NOTE — PROGRESS NOTES
curvature.  Musculoskeletal: No evidence of bone abnormalities, joint abnormalities, compromised muscle tone and restricted range of motion.  Hematologic/lymphatic: No evidence of tender or enlarged axillae lymph nodes, tender or enlarged lymph nodes, tender or enlarged groin lymph nodes, tender or enlarged neck lymph nodes and petechiae / purpura / ecchymosis.      IMAGING:   Imaging was personally reviewed      PATHOLOGY:   Pathology was personally reviewed      LABS:   All labs were personally reviewed  PSA history:   10/2/13: 1.58  1/20/15: 2.38  8/10/15: 2.6  18: <0.1 (post IMRT/ADT)  10/20/18: <0.1  19: <0.1  10/18/19: <0.1  2020: 0.087  21: 0.198  23: 0.804  24: 1.28      TIME and COUNSELIN minutes were spent with the patient,  acquiring the vital information vital to the case.  All outside records including urology notes, lab work/PSA/pathology, and imaging were personally reviewed.  I agree with the outside radiology impressions.  The patient was examined to verify findings as related in the HPI, as well as other areas as needed.  Time was allowed for all questions about the proposed course of care to be answered.  Greater than 50% time was spent face to face with the patient in counseling and coordination of care.         Haider Dunbar MD  [facility]  [facility]  [id number] [place] [id number]  P: [phone]  [facility] [address], [place] [address]  P: [phone]  [facility]  [address] [place] [address], [address], [place] [address]  P: [phone]

## 2024-10-03 LAB — PSA SERPL DL<=0.01 NG/ML-MCNC: 1.44 NG/ML (ref 0–4)

## 2024-10-04 ENCOUNTER — TELEPHONE (OUTPATIENT)
Facility: HOSPITAL | Age: 74
End: 2024-10-04

## 2024-10-10 ENCOUNTER — OFFICE VISIT (OUTPATIENT)
Facility: CLINIC | Age: 74
End: 2024-10-10
Payer: MEDICARE

## 2024-10-10 VITALS
HEART RATE: 60 BPM | RESPIRATION RATE: 20 BRPM | OXYGEN SATURATION: 99 % | DIASTOLIC BLOOD PRESSURE: 80 MMHG | HEIGHT: 71 IN | TEMPERATURE: 98.6 F | BODY MASS INDEX: 38.78 KG/M2 | WEIGHT: 277 LBS | SYSTOLIC BLOOD PRESSURE: 130 MMHG

## 2024-10-10 DIAGNOSIS — E78.00 PURE HYPERCHOLESTEROLEMIA, UNSPECIFIED: ICD-10-CM

## 2024-10-10 DIAGNOSIS — M48.07 SPINAL STENOSIS OF LUMBOSACRAL REGION: Primary | ICD-10-CM

## 2024-10-10 DIAGNOSIS — E11.9 TYPE 2 DIABETES MELLITUS WITHOUT COMPLICATION, WITHOUT LONG-TERM CURRENT USE OF INSULIN (HCC): ICD-10-CM

## 2024-10-10 LAB
ALBUMIN SERPL-MCNC: 4.2 G/DL (ref 3.5–5)
ALBUMIN/GLOB SERPL: 1.3 (ref 1.1–2.2)
ALP SERPL-CCNC: 70 U/L (ref 45–117)
ALT SERPL-CCNC: 46 U/L (ref 12–78)
ANION GAP SERPL CALC-SCNC: 4 MMOL/L (ref 2–12)
AST SERPL-CCNC: 69 U/L (ref 15–37)
BILIRUB SERPL-MCNC: 0.5 MG/DL (ref 0.2–1)
BUN SERPL-MCNC: 16 MG/DL (ref 6–20)
BUN/CREAT SERPL: 16 (ref 12–20)
CALCIUM SERPL-MCNC: 9.1 MG/DL (ref 8.5–10.1)
CHLORIDE SERPL-SCNC: 111 MMOL/L (ref 97–108)
CO2 SERPL-SCNC: 27 MMOL/L (ref 21–32)
CREAT SERPL-MCNC: 1 MG/DL (ref 0.7–1.3)
ERYTHROCYTE [DISTWIDTH] IN BLOOD BY AUTOMATED COUNT: 12.1 % (ref 11.5–14.5)
GLOBULIN SER CALC-MCNC: 3.3 G/DL (ref 2–4)
GLUCOSE SERPL-MCNC: 109 MG/DL (ref 65–100)
GLUCOSE, POC: 134 MG/DL
HCT VFR BLD AUTO: 36.5 % (ref 36.6–50.3)
HGB BLD-MCNC: 11.6 G/DL (ref 12.1–17)
MCH RBC QN AUTO: 31.8 PG (ref 26–34)
MCHC RBC AUTO-ENTMCNC: 31.8 G/DL (ref 30–36.5)
MCV RBC AUTO: 100 FL (ref 80–99)
NRBC # BLD: 0 K/UL (ref 0–0.01)
NRBC BLD-RTO: 0 PER 100 WBC
PLATELET # BLD AUTO: 199 K/UL (ref 150–400)
PMV BLD AUTO: 12.3 FL (ref 8.9–12.9)
POTASSIUM SERPL-SCNC: 4.6 MMOL/L (ref 3.5–5.1)
PROT SERPL-MCNC: 7.5 G/DL (ref 6.4–8.2)
RBC # BLD AUTO: 3.65 M/UL (ref 4.1–5.7)
SODIUM SERPL-SCNC: 142 MMOL/L (ref 136–145)
WBC # BLD AUTO: 5.5 K/UL (ref 4.1–11.1)

## 2024-10-10 PROCEDURE — 99214 OFFICE O/P EST MOD 30 MIN: CPT | Performed by: INTERNAL MEDICINE

## 2024-10-10 PROCEDURE — G8484 FLU IMMUNIZE NO ADMIN: HCPCS | Performed by: INTERNAL MEDICINE

## 2024-10-10 PROCEDURE — 1036F TOBACCO NON-USER: CPT | Performed by: INTERNAL MEDICINE

## 2024-10-10 PROCEDURE — 1123F ACP DISCUSS/DSCN MKR DOCD: CPT | Performed by: INTERNAL MEDICINE

## 2024-10-10 PROCEDURE — 3044F HG A1C LEVEL LT 7.0%: CPT | Performed by: INTERNAL MEDICINE

## 2024-10-10 PROCEDURE — 82962 GLUCOSE BLOOD TEST: CPT | Performed by: INTERNAL MEDICINE

## 2024-10-10 PROCEDURE — 3017F COLORECTAL CA SCREEN DOC REV: CPT | Performed by: INTERNAL MEDICINE

## 2024-10-10 PROCEDURE — G8417 CALC BMI ABV UP PARAM F/U: HCPCS | Performed by: INTERNAL MEDICINE

## 2024-10-10 PROCEDURE — 2022F DILAT RTA XM EVC RTNOPTHY: CPT | Performed by: INTERNAL MEDICINE

## 2024-10-10 PROCEDURE — G8427 DOCREV CUR MEDS BY ELIG CLIN: HCPCS | Performed by: INTERNAL MEDICINE

## 2024-10-10 SDOH — ECONOMIC STABILITY: INCOME INSECURITY: HOW HARD IS IT FOR YOU TO PAY FOR THE VERY BASICS LIKE FOOD, HOUSING, MEDICAL CARE, AND HEATING?: SOMEWHAT HARD

## 2024-10-10 SDOH — ECONOMIC STABILITY: FOOD INSECURITY: WITHIN THE PAST 12 MONTHS, YOU WORRIED THAT YOUR FOOD WOULD RUN OUT BEFORE YOU GOT MONEY TO BUY MORE.: NEVER TRUE

## 2024-10-10 SDOH — ECONOMIC STABILITY: FOOD INSECURITY: WITHIN THE PAST 12 MONTHS, THE FOOD YOU BOUGHT JUST DIDN'T LAST AND YOU DIDN'T HAVE MONEY TO GET MORE.: NEVER TRUE

## 2024-10-10 ASSESSMENT — ANXIETY QUESTIONNAIRES
3. WORRYING TOO MUCH ABOUT DIFFERENT THINGS: NOT AT ALL
6. BECOMING EASILY ANNOYED OR IRRITABLE: NOT AT ALL
5. BEING SO RESTLESS THAT IT IS HARD TO SIT STILL: NOT AT ALL
GAD7 TOTAL SCORE: 0
1. FEELING NERVOUS, ANXIOUS, OR ON EDGE: NOT AT ALL
IF YOU CHECKED OFF ANY PROBLEMS ON THIS QUESTIONNAIRE, HOW DIFFICULT HAVE THESE PROBLEMS MADE IT FOR YOU TO DO YOUR WORK, TAKE CARE OF THINGS AT HOME, OR GET ALONG WITH OTHER PEOPLE: NOT DIFFICULT AT ALL
4. TROUBLE RELAXING: NOT AT ALL
7. FEELING AFRAID AS IF SOMETHING AWFUL MIGHT HAPPEN: NOT AT ALL
2. NOT BEING ABLE TO STOP OR CONTROL WORRYING: NOT AT ALL

## 2024-10-10 ASSESSMENT — PATIENT HEALTH QUESTIONNAIRE - PHQ9
SUM OF ALL RESPONSES TO PHQ QUESTIONS 1-9: 0
1. LITTLE INTEREST OR PLEASURE IN DOING THINGS: NOT AT ALL
2. FEELING DOWN, DEPRESSED OR HOPELESS: NOT AT ALL
SUM OF ALL RESPONSES TO PHQ QUESTIONS 1-9: 0
SUM OF ALL RESPONSES TO PHQ9 QUESTIONS 1 & 2: 0
SUM OF ALL RESPONSES TO PHQ QUESTIONS 1-9: 0
SUM OF ALL RESPONSES TO PHQ QUESTIONS 1-9: 0

## 2024-10-10 NOTE — PROGRESS NOTES
\"Have you been to the ER, urgent care clinic since your last visit?  Hospitalized since your last visit?\"    no    “Have you seen or consulted any other health care providers outside our system since your last visit?”    no             
pharynx normal without lesions  Neck - supple, no significant adenopathy   Chest - clear to auscultation, no wheezes, rales or rhonchi, symmetric air entry   Heart - normal rate, regular rhythm, normal S1, S2, no murmurs, rubs, clicks or gallops   Abdomen - soft, nontender, nondistended, no masses or organomegaly  Lymph- no adenopathy palpable  Ext-peripheral pulses normal, no pedal edema, no clubbing or cyanosis  Skin-Warm and dry. no hyperpigmentation, vitiligo, or suspicious lesions  Neuro -alert, oriented, normal speech, no focal findings or movement disorder noted  Neck-normal C-spine, no tenderness, full ROM without pain  Feet-no nail deformities or callus formation with good pulses noted  Back-tenderness lower lumbar spine and sacral spine noted,forward flexion,hyperextension impaired,negative straight leg raise      Results for orders placed or performed in visit on 10/10/24   AMB POC GLUCOSE BLOOD, BY GLUCOSE MONITORING DEVICE   Result Value Ref Range    Glucose,  MG/DL       Assessment/Plan:    ICD-10-CM    1. Spinal stenosis of lumbosacral region  M48.07 External Referral To Neurosurgery      2. Type 2 diabetes mellitus without complication, without long-term current use of insulin (HCC)  E11.9 AMB POC GLUCOSE BLOOD, BY GLUCOSE MONITORING DEVICE     CBC     Comprehensive Metabolic Panel      3. Pure hypercholesterolemia, unspecified  E78.00         Orders Placed This Encounter   Procedures    CBC     Standing Status:   Future     Standing Expiration Date:   10/10/2025    Comprehensive Metabolic Panel     Standing Status:   Future     Standing Expiration Date:   10/10/2025    External Referral To Neurosurgery     Referral Priority:   Routine     Referral Type:   Eval and Treat     Referral Reason:   Patient Preference     Referred to Provider:   Jeremiah Hernandez MD     Requested Specialty:   Neurosurgery     Number of Visits Requested:   1    AMB POC GLUCOSE BLOOD, BY GLUCOSE MONITORING DEVICE

## 2024-12-24 NOTE — TELEPHONE ENCOUNTER
Last appointment: 10/10/2024 MD Chang   Next appointment: Nothing scheduled   Previous refill encounter(s):   01/04/2024 Actos #90 with 3 refills.     For Pharmacy Admin Tracking Only    Program: Medication Refill  Intervention Detail: New Rx: 1, reason: Patient Preference  Time Spent (min): 5    Requested Prescriptions     Pending Prescriptions Disp Refills    pioglitazone (ACTOS) 30 MG tablet [Pharmacy Med Name: PIOGLITAZONE 30MG   TAB] 90 tablet 0     Sig: Take 1 tablet by mouth once daily

## 2024-12-26 RX ORDER — PIOGLITAZONE 30 MG/1
30 TABLET ORAL DAILY
Qty: 90 TABLET | Refills: 0 | Status: SHIPPED | OUTPATIENT
Start: 2024-12-26

## 2025-03-28 NOTE — TELEPHONE ENCOUNTER
Last appointment: 10/10/2024 MD Chang   Next appointment: Nothing scheduled   Previous refill encounter(s):   12/26/2024 Actos #90    For Pharmacy Admin Tracking Only    Program: Medication Refill  Intervention Detail: New Rx: 1, reason: Patient Preference  Time Spent (min): 5    Requested Prescriptions     Pending Prescriptions Disp Refills    pioglitazone (ACTOS) 30 MG tablet 90 tablet 0     Sig: Take 1 tablet by mouth daily

## 2025-03-29 RX ORDER — PIOGLITAZONE 30 MG/1
30 TABLET ORAL DAILY
Qty: 90 TABLET | Refills: 0 | OUTPATIENT
Start: 2025-03-29

## 2025-04-25 RX ORDER — PIOGLITAZONE 30 MG/1
30 TABLET ORAL DAILY
Qty: 90 TABLET | Refills: 0 | OUTPATIENT
Start: 2025-04-25

## 2025-05-02 RX ORDER — PIOGLITAZONE 30 MG/1
30 TABLET ORAL DAILY
Qty: 90 TABLET | Refills: 0 | OUTPATIENT
Start: 2025-05-02

## 2025-05-06 RX ORDER — ATORVASTATIN CALCIUM 40 MG/1
40 TABLET, FILM COATED ORAL DAILY
Qty: 90 TABLET | Refills: 0 | OUTPATIENT
Start: 2025-05-06

## (undated) DEVICE — KIT,1200CC CANISTER,3/16"X6' TUBING: Brand: MEDLINE INDUSTRIES, INC.

## (undated) DEVICE — ELECTRODE,RADIOTRANSLUCENT,FOAM,5PK: Brand: MEDLINE

## (undated) DEVICE — BAG SPEC BIOHZRD 10 X 10 IN --

## (undated) DEVICE — CONTAINER SPEC 20 ML LID NEUT BUFF FORMALIN 10 % POLYPR STS

## (undated) DEVICE — FORCEPS BX L240CM JAW DIA2.8MM L CAP W/ NDL MIC MESH TOOTH

## (undated) DEVICE — REM POLYHESIVE ADULT PATIENT RETURN ELECTRODE: Brand: VALLEYLAB

## (undated) DEVICE — TRAP SPEC COLL POLYP POLYSTYR --

## (undated) DEVICE — CANNULA CUSH AD W/ 14FT TBG

## (undated) DEVICE — TOWEL 4 PLY TISS 19X30 SUE WHT

## (undated) DEVICE — SOLIDIFIER MEDC 1200ML -- CONVERT TO 356117

## (undated) DEVICE — SNARE ENDOSCP M L240CM W27MM SHTH DIA2.4MM CHN 2.8MM OVL